# Patient Record
Sex: MALE | Race: WHITE | HISPANIC OR LATINO | URBAN - METROPOLITAN AREA
[De-identification: names, ages, dates, MRNs, and addresses within clinical notes are randomized per-mention and may not be internally consistent; named-entity substitution may affect disease eponyms.]

---

## 2017-01-21 ENCOUNTER — OUTPATIENT (OUTPATIENT)
Dept: OUTPATIENT SERVICES | Facility: HOSPITAL | Age: 47
LOS: 1 days | End: 2017-01-21
Payer: SELF-PAY

## 2017-01-21 ENCOUNTER — APPOINTMENT (OUTPATIENT)
Dept: FAMILY MEDICINE | Facility: HOSPITAL | Age: 47
End: 2017-01-21

## 2017-01-21 VITALS
RESPIRATION RATE: 16 BRPM | SYSTOLIC BLOOD PRESSURE: 132 MMHG | HEART RATE: 90 BPM | OXYGEN SATURATION: 98 % | TEMPERATURE: 208.04 F | DIASTOLIC BLOOD PRESSURE: 69 MMHG

## 2017-01-21 PROCEDURE — G0463: CPT

## 2017-02-10 ENCOUNTER — APPOINTMENT (OUTPATIENT)
Dept: OPHTHALMOLOGY | Facility: CLINIC | Age: 47
End: 2017-02-10

## 2017-03-11 ENCOUNTER — OUTPATIENT (OUTPATIENT)
Dept: OUTPATIENT SERVICES | Facility: HOSPITAL | Age: 47
LOS: 1 days | End: 2017-03-11
Payer: SELF-PAY

## 2017-03-11 ENCOUNTER — RESULT CHARGE (OUTPATIENT)
Age: 47
End: 2017-03-11

## 2017-03-11 ENCOUNTER — APPOINTMENT (OUTPATIENT)
Dept: FAMILY MEDICINE | Facility: HOSPITAL | Age: 47
End: 2017-03-11

## 2017-03-11 VITALS
BODY MASS INDEX: 55.82 KG/M2 | TEMPERATURE: 208.04 F | RESPIRATION RATE: 16 BRPM | SYSTOLIC BLOOD PRESSURE: 141 MMHG | WEIGHT: 315 LBS | DIASTOLIC BLOOD PRESSURE: 82 MMHG | HEART RATE: 82 BPM | OXYGEN SATURATION: 97 %

## 2017-03-11 PROCEDURE — G0463: CPT

## 2017-03-16 LAB
BILIRUB UR QL STRIP: NEGATIVE
CLARITY UR: NORMAL
COLLECTION METHOD: NORMAL
GLUCOSE UR-MCNC: NEGATIVE
HCG UR QL: 1 EU/DL
HGB UR QL STRIP.AUTO: NORMAL
KETONES UR-MCNC: NEGATIVE
LEUKOCYTE ESTERASE UR QL STRIP: NORMAL
NITRITE UR QL STRIP: NEGATIVE
PH UR STRIP: 7
PROT UR STRIP-MCNC: NEGATIVE
SP GR UR STRIP: 1.02

## 2017-04-18 ENCOUNTER — RESULT CHARGE (OUTPATIENT)
Age: 47
End: 2017-04-18

## 2017-04-18 ENCOUNTER — APPOINTMENT (OUTPATIENT)
Dept: UROLOGY | Facility: HOSPITAL | Age: 47
End: 2017-04-18

## 2017-04-18 ENCOUNTER — OUTPATIENT (OUTPATIENT)
Dept: OUTPATIENT SERVICES | Facility: HOSPITAL | Age: 47
LOS: 1 days | End: 2017-04-18
Payer: SELF-PAY

## 2017-04-18 VITALS
SYSTOLIC BLOOD PRESSURE: 134 MMHG | HEART RATE: 76 BPM | BODY MASS INDEX: 46.65 KG/M2 | HEIGHT: 69 IN | WEIGHT: 315 LBS | RESPIRATION RATE: 16 BRPM | TEMPERATURE: 206.96 F | OXYGEN SATURATION: 97 % | DIASTOLIC BLOOD PRESSURE: 85 MMHG

## 2017-04-18 DIAGNOSIS — R31.29 OTHER MICROSCOPIC HEMATURIA: ICD-10-CM

## 2017-04-18 DIAGNOSIS — R39.9 UNSPECIFIED SYMPTOMS AND SIGNS INVOLVING THE GENITOURINARY SYSTEM: ICD-10-CM

## 2017-04-18 LAB
BILIRUB UR QL STRIP: NEGATIVE
GLUCOSE UR-MCNC: NEGATIVE
HCG UR QL: NORMAL EU/DL
HGB UR QL STRIP.AUTO: NORMAL
KETONES UR-MCNC: NEGATIVE
LEUKOCYTE ESTERASE UR QL STRIP: NORMAL
NITRITE UR QL STRIP: NEGATIVE
PH UR STRIP: 5
PROT UR STRIP-MCNC: NEGATIVE
SP GR UR STRIP: 1.02

## 2017-04-20 ENCOUNTER — FORM ENCOUNTER (OUTPATIENT)
Age: 47
End: 2017-04-20

## 2017-04-21 PROCEDURE — 87086 URINE CULTURE/COLONY COUNT: CPT

## 2017-04-21 PROCEDURE — 74176 CT ABD & PELVIS W/O CONTRAST: CPT

## 2017-04-21 PROCEDURE — 81001 URINALYSIS AUTO W/SCOPE: CPT

## 2017-04-21 PROCEDURE — 81002 URINALYSIS NONAUTO W/O SCOPE: CPT

## 2017-04-21 PROCEDURE — G0463: CPT

## 2017-04-30 ENCOUNTER — EMERGENCY (EMERGENCY)
Facility: HOSPITAL | Age: 47
LOS: 1 days | Discharge: HOME | End: 2017-04-30
Admitting: EMERGENCY MEDICINE
Payer: SELF-PAY

## 2017-04-30 PROCEDURE — 99283 EMERGENCY DEPT VISIT LOW MDM: CPT | Mod: 25

## 2017-04-30 PROCEDURE — 73630 X-RAY EXAM OF FOOT: CPT | Mod: 26,LT

## 2017-04-30 PROCEDURE — 99283 EMERGENCY DEPT VISIT LOW MDM: CPT

## 2017-04-30 PROCEDURE — 73630 X-RAY EXAM OF FOOT: CPT

## 2017-05-04 ENCOUNTER — APPOINTMENT (OUTPATIENT)
Age: 47
End: 2017-05-04

## 2017-05-06 ENCOUNTER — OUTPATIENT (OUTPATIENT)
Dept: OUTPATIENT SERVICES | Facility: HOSPITAL | Age: 47
LOS: 1 days | End: 2017-05-06
Payer: SELF-PAY

## 2017-05-06 PROCEDURE — 81001 URINALYSIS AUTO W/SCOPE: CPT

## 2017-05-06 PROCEDURE — 87086 URINE CULTURE/COLONY COUNT: CPT

## 2017-05-13 ENCOUNTER — OUTPATIENT (OUTPATIENT)
Dept: OUTPATIENT SERVICES | Facility: HOSPITAL | Age: 47
LOS: 1 days | End: 2017-05-13
Payer: SELF-PAY

## 2017-05-13 ENCOUNTER — APPOINTMENT (OUTPATIENT)
Dept: FAMILY MEDICINE | Facility: HOSPITAL | Age: 47
End: 2017-05-13

## 2017-05-13 VITALS
SYSTOLIC BLOOD PRESSURE: 139 MMHG | BODY MASS INDEX: 55.97 KG/M2 | TEMPERATURE: 97.9 F | WEIGHT: 315 LBS | RESPIRATION RATE: 16 BRPM | HEART RATE: 84 BPM | OXYGEN SATURATION: 97 % | DIASTOLIC BLOOD PRESSURE: 86 MMHG

## 2017-05-13 PROCEDURE — 36415 COLL VENOUS BLD VENIPUNCTURE: CPT

## 2017-05-13 PROCEDURE — 80048 BASIC METABOLIC PNL TOTAL CA: CPT

## 2017-05-13 PROCEDURE — 80061 LIPID PANEL: CPT

## 2017-05-13 PROCEDURE — 84550 ASSAY OF BLOOD/URIC ACID: CPT

## 2017-05-13 PROCEDURE — 83036 HEMOGLOBIN GLYCOSYLATED A1C: CPT

## 2017-05-13 PROCEDURE — G0463: CPT

## 2017-05-16 ENCOUNTER — APPOINTMENT (OUTPATIENT)
Dept: UROLOGY | Facility: HOSPITAL | Age: 47
End: 2017-05-16

## 2017-05-16 RX ORDER — NITROFURANTOIN (MONOHYDRATE/MACROCRYSTALS) 25; 75 MG/1; MG/1
100 CAPSULE ORAL TWICE DAILY
Qty: 10 | Refills: 0 | Status: COMPLETED | COMMUNITY
Start: 2017-04-21 | End: 2017-05-16

## 2017-06-01 ENCOUNTER — APPOINTMENT (OUTPATIENT)
Dept: OPHTHALMOLOGY | Facility: CLINIC | Age: 47
End: 2017-06-01

## 2017-06-15 ENCOUNTER — APPOINTMENT (OUTPATIENT)
Dept: OPHTHALMOLOGY | Facility: CLINIC | Age: 47
End: 2017-06-15

## 2017-06-16 ENCOUNTER — FORM ENCOUNTER (OUTPATIENT)
Age: 47
End: 2017-06-16

## 2017-06-17 ENCOUNTER — OUTPATIENT (OUTPATIENT)
Dept: OUTPATIENT SERVICES | Facility: HOSPITAL | Age: 47
LOS: 1 days | End: 2017-06-17
Payer: SELF-PAY

## 2017-06-17 ENCOUNTER — APPOINTMENT (OUTPATIENT)
Dept: FAMILY MEDICINE | Facility: HOSPITAL | Age: 47
End: 2017-06-17

## 2017-06-17 VITALS
HEIGHT: 69 IN | BODY MASS INDEX: 46.65 KG/M2 | SYSTOLIC BLOOD PRESSURE: 121 MMHG | TEMPERATURE: 98 F | OXYGEN SATURATION: 98 % | DIASTOLIC BLOOD PRESSURE: 85 MMHG | HEART RATE: 65 BPM | RESPIRATION RATE: 16 BRPM | WEIGHT: 315 LBS

## 2017-06-17 PROCEDURE — 84550 ASSAY OF BLOOD/URIC ACID: CPT

## 2017-06-17 PROCEDURE — G0463: CPT

## 2017-06-17 PROCEDURE — 85652 RBC SED RATE AUTOMATED: CPT

## 2017-06-17 PROCEDURE — 86140 C-REACTIVE PROTEIN: CPT

## 2017-06-17 PROCEDURE — 80069 RENAL FUNCTION PANEL: CPT

## 2017-06-17 PROCEDURE — 85025 COMPLETE CBC W/AUTO DIFF WBC: CPT

## 2017-06-17 PROCEDURE — 36415 COLL VENOUS BLD VENIPUNCTURE: CPT

## 2017-06-17 PROCEDURE — 73610 X-RAY EXAM OF ANKLE: CPT

## 2017-07-01 ENCOUNTER — OUTPATIENT (OUTPATIENT)
Dept: OUTPATIENT SERVICES | Facility: HOSPITAL | Age: 47
LOS: 1 days | End: 2017-07-01
Payer: SELF-PAY

## 2017-07-01 ENCOUNTER — APPOINTMENT (OUTPATIENT)
Dept: FAMILY MEDICINE | Facility: HOSPITAL | Age: 47
End: 2017-07-01

## 2017-07-01 VITALS
DIASTOLIC BLOOD PRESSURE: 87 MMHG | WEIGHT: 315 LBS | BODY MASS INDEX: 55.23 KG/M2 | HEART RATE: 52 BPM | OXYGEN SATURATION: 98 % | SYSTOLIC BLOOD PRESSURE: 119 MMHG | TEMPERATURE: 97.8 F | RESPIRATION RATE: 14 BRPM

## 2017-07-01 PROCEDURE — G0463: CPT

## 2017-07-01 RX ORDER — IBUPROFEN 800 MG/1
800 TABLET, FILM COATED ORAL
Qty: 40 | Refills: 0 | Status: DISCONTINUED | COMMUNITY
Start: 2017-04-30 | End: 2017-07-01

## 2017-07-10 ENCOUNTER — OUTPATIENT (OUTPATIENT)
Dept: OUTPATIENT SERVICES | Facility: HOSPITAL | Age: 47
LOS: 1 days | End: 2017-07-10
Payer: SELF-PAY

## 2017-07-10 ENCOUNTER — APPOINTMENT (OUTPATIENT)
Dept: UROLOGY | Facility: HOSPITAL | Age: 47
End: 2017-07-10

## 2017-07-10 VITALS — HEIGHT: 69 IN | BODY MASS INDEX: 46.65 KG/M2 | WEIGHT: 315 LBS

## 2017-07-10 VITALS — RESPIRATION RATE: 14 BRPM | HEART RATE: 59 BPM | DIASTOLIC BLOOD PRESSURE: 80 MMHG | SYSTOLIC BLOOD PRESSURE: 129 MMHG

## 2017-07-10 DIAGNOSIS — R30.0 DYSURIA: ICD-10-CM

## 2017-07-10 DIAGNOSIS — N39.0 URINARY TRACT INFECTION, SITE NOT SPECIFIED: ICD-10-CM

## 2017-07-10 LAB
APPEARANCE UR: ABNORMAL
BACTERIA # UR AUTO: NEGATIVE — SIGNIFICANT CHANGE UP
BILIRUB UR-MCNC: NEGATIVE — SIGNIFICANT CHANGE UP
COD CRY URNS QL: ABNORMAL
COLOR SPEC: YELLOW — SIGNIFICANT CHANGE UP
DIFF PNL FLD: ABNORMAL
EPI CELLS # UR: 1 /HPF — SIGNIFICANT CHANGE UP (ref 0–5)
GLUCOSE UR QL: NEGATIVE MG/DL — SIGNIFICANT CHANGE UP
GRAN CASTS # UR COMP ASSIST: 0 /LPF — SIGNIFICANT CHANGE UP
HYALINE CASTS # UR AUTO: 0 /LPF — SIGNIFICANT CHANGE UP (ref 0–7)
KETONES UR-MCNC: NEGATIVE — SIGNIFICANT CHANGE UP
LEUKOCYTE ESTERASE UR-ACNC: ABNORMAL
NITRITE UR-MCNC: NEGATIVE — SIGNIFICANT CHANGE UP
PH UR: 6 — SIGNIFICANT CHANGE UP (ref 5–8)
PROT UR-MCNC: NEGATIVE MG/DL — SIGNIFICANT CHANGE UP
PSA FREE FLD-MCNC: 0.17 NG/ML — SIGNIFICANT CHANGE UP
PSA FREE FLD-MCNC: 9.9 — SIGNIFICANT CHANGE UP
PSA FREE MFR FLD: 1.71 NG/ML — SIGNIFICANT CHANGE UP (ref 0–4)
RBC CASTS # UR COMP ASSIST: 7 /HPF — HIGH (ref 0–4)
SP GR SPEC: 1.03 — HIGH (ref 1.01–1.02)
TRI-PHOS CRY UR QL COMP ASSIST: NEGATIVE — SIGNIFICANT CHANGE UP
URATE CRY FLD QL MICRO: NEGATIVE — SIGNIFICANT CHANGE UP
UROBILINOGEN FLD QL: NEGATIVE MG/DL — SIGNIFICANT CHANGE UP
WBC UR QL: 52 /HPF — HIGH (ref 0–5)

## 2017-07-10 PROCEDURE — 87086 URINE CULTURE/COLONY COUNT: CPT

## 2017-07-10 PROCEDURE — 87186 SC STD MICRODIL/AGAR DIL: CPT

## 2017-07-10 PROCEDURE — G0463: CPT

## 2017-07-10 PROCEDURE — 51798 US URINE CAPACITY MEASURE: CPT

## 2017-07-10 PROCEDURE — 84154 ASSAY OF PSA FREE: CPT

## 2017-07-10 PROCEDURE — 81001 URINALYSIS AUTO W/SCOPE: CPT

## 2017-07-10 PROCEDURE — 84153 ASSAY OF PSA TOTAL: CPT

## 2017-07-10 RX ORDER — NITROFURANTOIN (MONOHYDRATE/MACROCRYSTALS) 25; 75 MG/1; MG/1
100 CAPSULE ORAL
Qty: 10 | Refills: 0 | Status: DISCONTINUED | COMMUNITY
Start: 2017-05-13 | End: 2017-07-10

## 2018-05-22 ENCOUNTER — EMERGENCY (EMERGENCY)
Facility: HOSPITAL | Age: 48
LOS: 1 days | Discharge: HOME | End: 2018-05-22
Admitting: EMERGENCY MEDICINE
Payer: MEDICAID

## 2018-05-22 PROCEDURE — 99283 EMERGENCY DEPT VISIT LOW MDM: CPT | Mod: 25

## 2018-05-22 PROCEDURE — 99053 MED SERV 10PM-8AM 24 HR FAC: CPT

## 2018-05-22 PROCEDURE — 99283 EMERGENCY DEPT VISIT LOW MDM: CPT

## 2018-06-23 ENCOUNTER — EMERGENCY (EMERGENCY)
Facility: HOSPITAL | Age: 48
LOS: 1 days | Discharge: HOME | End: 2018-06-23
Attending: EMERGENCY MEDICINE | Admitting: EMERGENCY MEDICINE
Payer: MEDICAID

## 2018-06-23 VITALS
DIASTOLIC BLOOD PRESSURE: 95 MMHG | OXYGEN SATURATION: 96 % | WEIGHT: 315 LBS | HEART RATE: 92 BPM | SYSTOLIC BLOOD PRESSURE: 160 MMHG | RESPIRATION RATE: 18 BRPM | TEMPERATURE: 98 F

## 2018-06-23 PROCEDURE — 99282 EMERGENCY DEPT VISIT SF MDM: CPT

## 2018-09-28 ENCOUNTER — EMERGENCY (EMERGENCY)
Facility: HOSPITAL | Age: 48
LOS: 1 days | Discharge: HOME | End: 2018-09-28
Attending: EMERGENCY MEDICINE | Admitting: EMERGENCY MEDICINE
Payer: MEDICAID

## 2018-09-28 VITALS
HEART RATE: 93 BPM | DIASTOLIC BLOOD PRESSURE: 81 MMHG | WEIGHT: 315 LBS | TEMPERATURE: 98 F | HEIGHT: 69 IN | SYSTOLIC BLOOD PRESSURE: 129 MMHG | RESPIRATION RATE: 20 BRPM | OXYGEN SATURATION: 95 %

## 2018-09-28 VITALS
RESPIRATION RATE: 18 BRPM | SYSTOLIC BLOOD PRESSURE: 112 MMHG | TEMPERATURE: 98 F | DIASTOLIC BLOOD PRESSURE: 74 MMHG | OXYGEN SATURATION: 97 % | HEART RATE: 79 BPM

## 2018-09-28 DIAGNOSIS — R10.9 UNSPECIFIED ABDOMINAL PAIN: ICD-10-CM

## 2018-09-28 DIAGNOSIS — Z98.890 OTHER SPECIFIED POSTPROCEDURAL STATES: Chronic | ICD-10-CM

## 2018-09-28 LAB
ALBUMIN SERPL ELPH-MCNC: 3.4 G/DL — SIGNIFICANT CHANGE UP (ref 3.3–5)
ALP SERPL-CCNC: 90 U/L — SIGNIFICANT CHANGE UP (ref 40–120)
ALT FLD-CCNC: 36 U/L DA — SIGNIFICANT CHANGE UP (ref 10–45)
AMYLASE P1 CFR SERPL: 23 U/L — LOW (ref 25–125)
ANION GAP SERPL CALC-SCNC: 9 MMOL/L — SIGNIFICANT CHANGE UP (ref 5–17)
APTT BLD: 34.7 SEC — SIGNIFICANT CHANGE UP (ref 27.5–37.4)
AST SERPL-CCNC: 22 U/L — SIGNIFICANT CHANGE UP (ref 10–40)
BASOPHILS # BLD AUTO: 0.1 K/UL — SIGNIFICANT CHANGE UP (ref 0–0.2)
BASOPHILS NFR BLD AUTO: 0.4 % — SIGNIFICANT CHANGE UP (ref 0–2)
BILIRUB SERPL-MCNC: 0.3 MG/DL — SIGNIFICANT CHANGE UP (ref 0.2–1.2)
BUN SERPL-MCNC: 17 MG/DL — SIGNIFICANT CHANGE UP (ref 7–23)
CALCIUM SERPL-MCNC: 8.8 MG/DL — SIGNIFICANT CHANGE UP (ref 8.4–10.5)
CHLORIDE SERPL-SCNC: 101 MMOL/L — SIGNIFICANT CHANGE UP (ref 96–108)
CK MB BLD-MCNC: 0.8 % — SIGNIFICANT CHANGE UP (ref 0–3.5)
CK MB CFR SERPL CALC: 1 NG/ML — SIGNIFICANT CHANGE UP (ref 0.5–10)
CK SERPL-CCNC: 120 U/L — SIGNIFICANT CHANGE UP (ref 30–200)
CO2 SERPL-SCNC: 28 MMOL/L — SIGNIFICANT CHANGE UP (ref 22–31)
CREAT SERPL-MCNC: 0.93 MG/DL — SIGNIFICANT CHANGE UP (ref 0.5–1.3)
EOSINOPHIL # BLD AUTO: 0.3 K/UL — SIGNIFICANT CHANGE UP (ref 0–0.5)
EOSINOPHIL NFR BLD AUTO: 2.4 % — SIGNIFICANT CHANGE UP (ref 0–6)
GLUCOSE SERPL-MCNC: 133 MG/DL — HIGH (ref 70–99)
HCT VFR BLD CALC: 48.6 % — SIGNIFICANT CHANGE UP (ref 39–50)
HGB BLD-MCNC: 15.8 G/DL — SIGNIFICANT CHANGE UP (ref 13–17)
INR BLD: 1.12 RATIO — SIGNIFICANT CHANGE UP (ref 0.88–1.16)
LIDOCAIN IGE QN: 74 U/L — SIGNIFICANT CHANGE UP (ref 73–393)
LYMPHOCYTES # BLD AUTO: 0.6 K/UL — LOW (ref 1–3.3)
LYMPHOCYTES # BLD AUTO: 4.7 % — LOW (ref 13–44)
MCHC RBC-ENTMCNC: 29.3 PG — SIGNIFICANT CHANGE UP (ref 27–34)
MCHC RBC-ENTMCNC: 32.5 GM/DL — SIGNIFICANT CHANGE UP (ref 32–36)
MCV RBC AUTO: 90.1 FL — SIGNIFICANT CHANGE UP (ref 80–100)
MONOCYTES # BLD AUTO: 0.6 K/UL — SIGNIFICANT CHANGE UP (ref 0–0.9)
MONOCYTES NFR BLD AUTO: 5.2 % — SIGNIFICANT CHANGE UP (ref 1–9)
NEUTROPHILS # BLD AUTO: 10.3 K/UL — HIGH (ref 1.8–7.4)
NEUTROPHILS NFR BLD AUTO: 87.2 % — HIGH (ref 43–77)
PLATELET # BLD AUTO: 380 K/UL — SIGNIFICANT CHANGE UP (ref 150–400)
POTASSIUM SERPL-MCNC: 4.1 MMOL/L — SIGNIFICANT CHANGE UP (ref 3.5–5.3)
POTASSIUM SERPL-SCNC: 4.1 MMOL/L — SIGNIFICANT CHANGE UP (ref 3.5–5.3)
PROT SERPL-MCNC: 7.6 G/DL — SIGNIFICANT CHANGE UP (ref 6–8.3)
PROTHROM AB SERPL-ACNC: 12.5 SEC — SIGNIFICANT CHANGE UP (ref 9.8–12.7)
RBC # BLD: 5.39 M/UL — SIGNIFICANT CHANGE UP (ref 4.2–5.8)
RBC # FLD: 13.2 % — SIGNIFICANT CHANGE UP (ref 10.3–14.5)
SODIUM SERPL-SCNC: 138 MMOL/L — SIGNIFICANT CHANGE UP (ref 135–145)
TROPONIN I SERPL-MCNC: <.017 NG/ML — LOW (ref 0.02–0.06)
WBC # BLD: 11.8 K/UL — HIGH (ref 3.8–10.5)
WBC # FLD AUTO: 11.8 K/UL — HIGH (ref 3.8–10.5)

## 2018-09-28 PROCEDURE — 93010 ELECTROCARDIOGRAM REPORT: CPT

## 2018-09-28 PROCEDURE — 74177 CT ABD & PELVIS W/CONTRAST: CPT

## 2018-09-28 PROCEDURE — 96361 HYDRATE IV INFUSION ADD-ON: CPT

## 2018-09-28 PROCEDURE — 82550 ASSAY OF CK (CPK): CPT

## 2018-09-28 PROCEDURE — 83690 ASSAY OF LIPASE: CPT

## 2018-09-28 PROCEDURE — 93005 ELECTROCARDIOGRAM TRACING: CPT

## 2018-09-28 PROCEDURE — 84484 ASSAY OF TROPONIN QUANT: CPT

## 2018-09-28 PROCEDURE — 80053 COMPREHEN METABOLIC PANEL: CPT

## 2018-09-28 PROCEDURE — 96374 THER/PROPH/DIAG INJ IV PUSH: CPT | Mod: XU

## 2018-09-28 PROCEDURE — 96375 TX/PRO/DX INJ NEW DRUG ADDON: CPT

## 2018-09-28 PROCEDURE — 82553 CREATINE MB FRACTION: CPT

## 2018-09-28 PROCEDURE — 85610 PROTHROMBIN TIME: CPT

## 2018-09-28 PROCEDURE — 99284 EMERGENCY DEPT VISIT MOD MDM: CPT | Mod: 25

## 2018-09-28 PROCEDURE — 74177 CT ABD & PELVIS W/CONTRAST: CPT | Mod: 26

## 2018-09-28 PROCEDURE — 85027 COMPLETE CBC AUTOMATED: CPT

## 2018-09-28 PROCEDURE — 85730 THROMBOPLASTIN TIME PARTIAL: CPT

## 2018-09-28 PROCEDURE — 82150 ASSAY OF AMYLASE: CPT

## 2018-09-28 RX ORDER — FAMOTIDINE 10 MG/ML
20 INJECTION INTRAVENOUS ONCE
Qty: 0 | Refills: 0 | Status: COMPLETED | OUTPATIENT
Start: 2018-09-28 | End: 2018-09-28

## 2018-09-28 RX ORDER — SODIUM CHLORIDE 9 MG/ML
3 INJECTION INTRAMUSCULAR; INTRAVENOUS; SUBCUTANEOUS ONCE
Qty: 0 | Refills: 0 | Status: COMPLETED | OUTPATIENT
Start: 2018-09-28 | End: 2018-09-28

## 2018-09-28 RX ORDER — PANTOPRAZOLE SODIUM 20 MG/1
1 TABLET, DELAYED RELEASE ORAL
Qty: 30 | Refills: 0 | OUTPATIENT
Start: 2018-09-28 | End: 2018-10-27

## 2018-09-28 RX ORDER — ONDANSETRON 8 MG/1
4 TABLET, FILM COATED ORAL ONCE
Qty: 0 | Refills: 0 | Status: COMPLETED | OUTPATIENT
Start: 2018-09-28 | End: 2018-09-28

## 2018-09-28 RX ORDER — SODIUM CHLORIDE 9 MG/ML
1000 INJECTION INTRAMUSCULAR; INTRAVENOUS; SUBCUTANEOUS ONCE
Qty: 0 | Refills: 0 | Status: COMPLETED | OUTPATIENT
Start: 2018-09-28 | End: 2018-09-28

## 2018-09-28 RX ADMIN — SODIUM CHLORIDE 3 MILLILITER(S): 9 INJECTION INTRAMUSCULAR; INTRAVENOUS; SUBCUTANEOUS at 05:46

## 2018-09-28 RX ADMIN — FAMOTIDINE 100 MILLIGRAM(S): 10 INJECTION INTRAVENOUS at 05:46

## 2018-09-28 RX ADMIN — FAMOTIDINE 20 MILLIGRAM(S): 10 INJECTION INTRAVENOUS at 05:48

## 2018-09-28 RX ADMIN — SODIUM CHLORIDE 1000 MILLILITER(S): 9 INJECTION INTRAMUSCULAR; INTRAVENOUS; SUBCUTANEOUS at 05:45

## 2018-09-28 RX ADMIN — SODIUM CHLORIDE 1000 MILLILITER(S): 9 INJECTION INTRAMUSCULAR; INTRAVENOUS; SUBCUTANEOUS at 07:15

## 2018-09-28 RX ADMIN — ONDANSETRON 4 MILLIGRAM(S): 8 TABLET, FILM COATED ORAL at 05:46

## 2018-09-28 NOTE — ED PROVIDER NOTE - OBJECTIVE STATEMENT
pt c/o upper abd pain since 7pm tonight. moderate, burning, sharp. assoc c nausea. self induced vomited x2 c some relief. worse lying down.  no chest pain/sob. no h/o similar sx or chest pain c exertion.  pt states he had buffet with "bad food" night before and was not feeling well all day. +family h/o pancreatic ca

## 2018-09-28 NOTE — ED ADULT NURSE NOTE - OBJECTIVE STATEMENT
pt c/o upper abd pain with nausea since wednesday night.  pt induced vomiting x 2 with a little relief afterwards.

## 2018-09-28 NOTE — ED ADULT NURSE NOTE - CHIEF COMPLAINT QUOTE
pt c/o upper abd pain with nausea since Wednesday night after eating buffet.  pt forced vomited x2 yesterday, stated he felt a little better afterwards.  pt took 2 AlkaSeltzer at 9pm with a little relief.  pain worse when he lies down,  c/o SOB x 2 days

## 2018-09-28 NOTE — ED PROVIDER NOTE - MEDICAL DECISION MAKING DETAILS
Health Maintenance Summary     Topic Due On Due Status Completed On    IMMUNIZATION - DTaP/Tdap/Td Jan 19, 2020 Not Due Jan 19, 2010    Immunization-Influenza  Completed Mar 13, 2017          Patient is due for topics as listed above, he wishes to discuss with provider .     upper abd pain . possible indigestion, gerd, gastritis, r/o pancreatitis, cholecystitis.  h/p not c/w cardiac pathology but will check ekg, trop.  check labs, ct ap. give pepcid/zofran. reassess

## 2018-10-13 ENCOUNTER — TRANSCRIPTION ENCOUNTER (OUTPATIENT)
Age: 48
End: 2018-10-13

## 2018-10-13 ENCOUNTER — APPOINTMENT (OUTPATIENT)
Dept: FAMILY MEDICINE | Facility: HOSPITAL | Age: 48
End: 2018-10-13

## 2018-10-13 ENCOUNTER — OUTPATIENT (OUTPATIENT)
Dept: OUTPATIENT SERVICES | Facility: HOSPITAL | Age: 48
LOS: 1 days | End: 2018-10-13
Payer: SELF-PAY

## 2018-10-13 VITALS
RESPIRATION RATE: 16 BRPM | WEIGHT: 315 LBS | DIASTOLIC BLOOD PRESSURE: 99 MMHG | BODY MASS INDEX: 54.34 KG/M2 | SYSTOLIC BLOOD PRESSURE: 149 MMHG | OXYGEN SATURATION: 97 % | TEMPERATURE: 97.8 F | HEART RATE: 97 BPM

## 2018-10-13 DIAGNOSIS — Z98.890 OTHER SPECIFIED POSTPROCEDURAL STATES: Chronic | ICD-10-CM

## 2018-10-13 DIAGNOSIS — E78.5 HYPERLIPIDEMIA, UNSPECIFIED: ICD-10-CM

## 2018-10-13 DIAGNOSIS — Z00.00 ENCOUNTER FOR GENERAL ADULT MEDICAL EXAMINATION WITHOUT ABNORMAL FINDINGS: ICD-10-CM

## 2018-10-13 PROBLEM — E66.9 OBESITY, UNSPECIFIED: Chronic | Status: ACTIVE | Noted: 2018-09-28

## 2018-10-13 PROBLEM — G47.30 SLEEP APNEA, UNSPECIFIED: Chronic | Status: ACTIVE | Noted: 2018-09-28

## 2018-10-13 PROCEDURE — 80053 COMPREHEN METABOLIC PANEL: CPT

## 2018-10-13 PROCEDURE — 83036 HEMOGLOBIN GLYCOSYLATED A1C: CPT

## 2018-10-13 PROCEDURE — G0463: CPT

## 2018-10-13 PROCEDURE — 80061 LIPID PANEL: CPT

## 2018-10-13 PROCEDURE — 84443 ASSAY THYROID STIM HORMONE: CPT

## 2018-10-13 NOTE — PHYSICAL EXAM

## 2018-10-13 NOTE — PLAN
[FreeTextEntry1] : 48M w/PMH of morbid obesity, gout, nephrolithiasis, urethral stricture presents to clinic for CPE. \par \par #Nephrolithiasis\par - Pt reports hematuria and dysuria resolved 6 months ago\par - Reviewed CT abd which revealed punctuate B/L stones\par \par #Lipoma\par - R medial antecubital mass, 6cm, firm, rubbery, mobile\par - F/U U/S R UE\par \par #Morbid obesity / prediabetes \par - F/U A1c\par - Nutritionist referral\par \par #HM\par - F/U CBC, CMP, lipids, A1c \par - Pt declines flu vaccine\par \par Case discussed w/ Dr Boles

## 2018-10-13 NOTE — HISTORY OF PRESENT ILLNESS
[FreeTextEntry1] : CPE [de-identified] : 48M w/PMH of morbid obesity, gout, nephrolithiasis, urethral stricture presents to clinic for CPE. Pt reports hematuria and dysuria resolved 6 months ago. Pt has concern for R medial antecubital mass, 6cm, firm, rubbery, mobile which pt reports was "harder" previously. Pt denies pain/tenderness, fevers/chills, weight change.

## 2018-10-16 DIAGNOSIS — D17.21 BENIGN LIPOMATOUS NEOPLASM OF SKIN AND SUBCUTANEOUS TISSUE OF RIGHT ARM: ICD-10-CM

## 2018-10-16 DIAGNOSIS — E78.5 HYPERLIPIDEMIA, UNSPECIFIED: ICD-10-CM

## 2018-10-16 DIAGNOSIS — R73.03 PREDIABETES: ICD-10-CM

## 2018-10-21 LAB
BASOPHILS # BLD AUTO: 0.06 K/UL
BASOPHILS NFR BLD AUTO: 0.6 %
EOSINOPHIL # BLD AUTO: 0.36 K/UL
EOSINOPHIL NFR BLD AUTO: 3.4 %
HCT VFR BLD CALC: 47.3 %
HGB BLD-MCNC: 15 G/DL
IMM GRANULOCYTES NFR BLD AUTO: 0.4 %
LYMPHOCYTES # BLD AUTO: 2.26 K/UL
LYMPHOCYTES NFR BLD AUTO: 21.1 %
MAN DIFF?: NORMAL
MCHC RBC-ENTMCNC: 28.8 PG
MCHC RBC-ENTMCNC: 31.7 GM/DL
MCV RBC AUTO: 90.8 FL
MONOCYTES # BLD AUTO: 0.75 K/UL
MONOCYTES NFR BLD AUTO: 7 %
NEUTROPHILS # BLD AUTO: 7.22 K/UL
NEUTROPHILS NFR BLD AUTO: 67.5 %
PLATELET # BLD AUTO: 429 K/UL
RBC # BLD: 5.21 M/UL
RBC # FLD: 13.9 %
WBC # FLD AUTO: 10.69 K/UL

## 2018-10-22 LAB
ALBUMIN SERPL ELPH-MCNC: 4.1 G/DL
ALP BLD-CCNC: 101 U/L
ALT SERPL-CCNC: 24 U/L
ANION GAP SERPL CALC-SCNC: 12 MMOL/L
AST SERPL-CCNC: 25 U/L
BILIRUB SERPL-MCNC: 0.4 MG/DL
BUN SERPL-MCNC: 18 MG/DL
CALCIUM SERPL-MCNC: 9.7 MG/DL
CHLORIDE SERPL-SCNC: 100 MMOL/L
CHOLEST SERPL-MCNC: 192 MG/DL
CHOLEST/HDLC SERPL: 5.3 RATIO
CO2 SERPL-SCNC: 29 MMOL/L
CREAT SERPL-MCNC: 0.89 MG/DL
GLUCOSE SERPL-MCNC: 91 MG/DL
HBA1C MFR BLD HPLC: 5.9 %
HDLC SERPL-MCNC: 36 MG/DL
LDLC SERPL CALC-MCNC: 127 MG/DL
POTASSIUM SERPL-SCNC: 4.9 MMOL/L
PROT SERPL-MCNC: 7.7 G/DL
SODIUM SERPL-SCNC: 141 MMOL/L
TRIGL SERPL-MCNC: 147 MG/DL
TSH SERPL-ACNC: 1.86 UIU/ML

## 2019-02-09 ENCOUNTER — MED ADMIN CHARGE (OUTPATIENT)
Age: 49
End: 2019-02-09

## 2019-02-09 ENCOUNTER — OUTPATIENT (OUTPATIENT)
Dept: OUTPATIENT SERVICES | Facility: HOSPITAL | Age: 49
LOS: 1 days | End: 2019-02-09
Payer: SELF-PAY

## 2019-02-09 ENCOUNTER — APPOINTMENT (OUTPATIENT)
Age: 49
End: 2019-02-09

## 2019-02-09 VITALS
OXYGEN SATURATION: 98 % | DIASTOLIC BLOOD PRESSURE: 86 MMHG | HEART RATE: 66 BPM | RESPIRATION RATE: 16 BRPM | SYSTOLIC BLOOD PRESSURE: 136 MMHG | BODY MASS INDEX: 46.65 KG/M2 | HEIGHT: 69 IN | WEIGHT: 315 LBS | TEMPERATURE: 97.9 F

## 2019-02-09 DIAGNOSIS — Z98.890 OTHER SPECIFIED POSTPROCEDURAL STATES: Chronic | ICD-10-CM

## 2019-02-09 DIAGNOSIS — Z00.00 ENCOUNTER FOR GENERAL ADULT MEDICAL EXAMINATION WITHOUT ABNORMAL FINDINGS: ICD-10-CM

## 2019-02-09 PROCEDURE — G0463: CPT

## 2019-02-09 NOTE — ASSESSMENT
[FreeTextEntry1] : Tdap given today\par \par RTC in one week for f/u and flu shot\par Consider CT of the sinuses if patient nasal breathing is worsening or not alleviated by Fluticasone\par \par Case dw Dr. Boles

## 2019-02-09 NOTE — PHYSICAL EXAM

## 2019-02-09 NOTE — HISTORY OF PRESENT ILLNESS
[FreeTextEntry8] : Patient is 48y M presenting acutely to the clinic for cough. patient with viral URI one week ago, was febrile, has not been febrile in a week, and is now with lingering cough, hardly any production, sometimes with white phlegm. Patient also admits a history of boxing, and broken noses, has trouble with breathing through his nose at night, admits increased congestion as well, uses CPAP normally due to MYA. Patient also with complaints of muscle cramping, intermittent pain without radiation in L side, no exacerbating factors, but notices it more when lying in bed. Has tempurpedic mattress at home.

## 2019-02-09 NOTE — REVIEW OF SYSTEMS
[Postnasal Drip] : postnasal drip [Cough] : cough [Muscle Pain] : muscle pain [Muscle Weakness] : muscle weakness [Back Pain] : back pain [Negative] : Heme/Lymph [Nasal Discharge] : no nasal discharge [Shortness Of Breath] : no shortness of breath [Wheezing] : no wheezing [Dyspnea on Exertion] : not dyspnea on exertion [Joint Pain] : no joint pain [Joint Stiffness] : no joint stiffness [Joint Swelling] : no joint swelling [FreeTextEntry4] : nasal congestion+ [FreeTextEntry9] : muscle weakness+ oblique and R paraspinal muscle pain/cramps

## 2019-02-13 DIAGNOSIS — R09.81 NASAL CONGESTION: ICD-10-CM

## 2019-12-30 ENCOUNTER — OUTPATIENT (OUTPATIENT)
Dept: OUTPATIENT SERVICES | Facility: HOSPITAL | Age: 49
LOS: 1 days | End: 2019-12-30
Payer: SELF-PAY

## 2019-12-30 ENCOUNTER — APPOINTMENT (OUTPATIENT)
Dept: FAMILY MEDICINE | Facility: HOSPITAL | Age: 49
End: 2019-12-30

## 2019-12-30 VITALS
OXYGEN SATURATION: 96 % | HEART RATE: 94 BPM | DIASTOLIC BLOOD PRESSURE: 76 MMHG | WEIGHT: 315 LBS | TEMPERATURE: 98.4 F | HEIGHT: 69 IN | SYSTOLIC BLOOD PRESSURE: 110 MMHG | RESPIRATION RATE: 18 BRPM | BODY MASS INDEX: 46.65 KG/M2

## 2019-12-30 DIAGNOSIS — Z87.19 PERSONAL HISTORY OF OTHER DISEASES OF THE DIGESTIVE SYSTEM: ICD-10-CM

## 2019-12-30 DIAGNOSIS — Z98.890 OTHER SPECIFIED POSTPROCEDURAL STATES: Chronic | ICD-10-CM

## 2019-12-30 DIAGNOSIS — Z00.00 ENCOUNTER FOR GENERAL ADULT MEDICAL EXAMINATION WITHOUT ABNORMAL FINDINGS: ICD-10-CM

## 2019-12-30 PROCEDURE — G0463: CPT

## 2019-12-30 RX ORDER — CLOTRIMAZOLE 10 MG/G
1 CREAM TOPICAL TWICE DAILY
Qty: 1 | Refills: 0 | Status: DISCONTINUED | COMMUNITY
Start: 2017-06-17 | End: 2019-12-30

## 2019-12-30 RX ORDER — NAPROXEN 500 MG/1
500 TABLET ORAL 3 TIMES DAILY
Qty: 42 | Refills: 0 | Status: DISCONTINUED | COMMUNITY
Start: 2019-02-09 | End: 2019-12-30

## 2019-12-30 RX ORDER — CYCLOBENZAPRINE HYDROCHLORIDE 5 MG/1
5 TABLET, FILM COATED ORAL
Qty: 7 | Refills: 0 | Status: DISCONTINUED | COMMUNITY
Start: 2019-02-09 | End: 2019-12-30

## 2019-12-30 RX ORDER — FLUTICASONE PROPIONATE 50 UG/1
50 SPRAY, METERED NASAL TWICE DAILY
Qty: 1 | Refills: 0 | Status: DISCONTINUED | COMMUNITY
Start: 2019-02-09 | End: 2019-12-30

## 2019-12-31 DIAGNOSIS — J06.9 ACUTE UPPER RESPIRATORY INFECTION, UNSPECIFIED: ICD-10-CM

## 2019-12-31 DIAGNOSIS — R05 COUGH: ICD-10-CM

## 2019-12-31 NOTE — END OF VISIT
[] : Resident [FreeTextEntry3] : pt may have resolving Flu as his wife was positive for Flu test today, however it has been 4 days with sickness no med required

## 2019-12-31 NOTE — HISTORY OF PRESENT ILLNESS
[FreeTextEntry8] : 48 yo male presenting with c/o cough productive of green-rob sputum, nasal congestion and sore throat. Pt reports his sxs started 4 days ago with sneezing, later with associated sxs of chills, cough, pain in chest with cough, subjective fever on day of sxs onset, no actual recorded temp. Pt reports taking theraflu at home. States he feels a little better today compared to previous day, however wanted to come in to be evaluated given past hx of PNA.\par Of note, pt's wife is also sick with +flu.\par \par Pt reports using CPAP (setting of 18) at home for sleep apnea.

## 2020-01-11 ENCOUNTER — MED ADMIN CHARGE (OUTPATIENT)
Age: 50
End: 2020-01-11

## 2020-01-11 ENCOUNTER — APPOINTMENT (OUTPATIENT)
Dept: FAMILY MEDICINE | Facility: HOSPITAL | Age: 50
End: 2020-01-11

## 2020-01-11 ENCOUNTER — OUTPATIENT (OUTPATIENT)
Dept: OUTPATIENT SERVICES | Facility: HOSPITAL | Age: 50
LOS: 1 days | End: 2020-01-11
Payer: SELF-PAY

## 2020-01-11 VITALS
HEART RATE: 79 BPM | DIASTOLIC BLOOD PRESSURE: 80 MMHG | BODY MASS INDEX: 56.41 KG/M2 | WEIGHT: 315 LBS | OXYGEN SATURATION: 97 % | SYSTOLIC BLOOD PRESSURE: 118 MMHG | TEMPERATURE: 98.3 F | RESPIRATION RATE: 16 BRPM

## 2020-01-11 DIAGNOSIS — Z00.00 ENCOUNTER FOR GENERAL ADULT MEDICAL EXAMINATION WITHOUT ABNORMAL FINDINGS: ICD-10-CM

## 2020-01-11 DIAGNOSIS — Z98.890 OTHER SPECIFIED POSTPROCEDURAL STATES: Chronic | ICD-10-CM

## 2020-01-11 PROCEDURE — 80061 LIPID PANEL: CPT

## 2020-01-11 PROCEDURE — 86706 HEP B SURFACE ANTIBODY: CPT

## 2020-01-11 PROCEDURE — 86762 RUBELLA ANTIBODY: CPT

## 2020-01-11 PROCEDURE — 86735 MUMPS ANTIBODY: CPT

## 2020-01-11 PROCEDURE — 83036 HEMOGLOBIN GLYCOSYLATED A1C: CPT

## 2020-01-11 PROCEDURE — 86787 VARICELLA-ZOSTER ANTIBODY: CPT

## 2020-01-11 PROCEDURE — 86593 SYPHILIS TEST NON-TREP QUANT: CPT

## 2020-01-11 PROCEDURE — 80048 BASIC METABOLIC PNL TOTAL CA: CPT

## 2020-01-11 PROCEDURE — 86480 TB TEST CELL IMMUN MEASURE: CPT

## 2020-01-11 PROCEDURE — G0463: CPT

## 2020-01-11 PROCEDURE — 86765 RUBEOLA ANTIBODY: CPT

## 2020-01-11 RX ORDER — BENZONATATE 100 MG/1
100 CAPSULE ORAL
Qty: 21 | Refills: 0 | Status: COMPLETED | COMMUNITY
Start: 2019-02-09 | End: 2020-01-11

## 2020-01-11 NOTE — COUNSELING
[Encouraged to increase physical activity] : Encouraged to increase physical activity [Benefits of weight loss discussed] : Benefits of weight loss discussed [Potential consequences of obesity discussed] : Potential consequences of obesity discussed [Good understanding] : Patient has a good understanding of disease, goals and obesity follow-up plan [Decrease Portions] : decrease portions [____ min/wk Activity] : [unfilled] min/wk activity

## 2020-01-12 NOTE — HEALTH RISK ASSESSMENT
[Good] : ~his/her~  mood as  good [No] : No [With Significant Other] : lives with significant other [Employed] : employed [] :  [Fully functional (using the telephone, shopping, preparing meals, housekeeping, doing laundry, using] : Fully functional and needs no help or supervision to perform IADLs (using the telephone, shopping, preparing meals, housekeeping, doing laundry, using transportation, managing medications and managing finances) [Fully functional (bathing, dressing, toileting, transferring, walking, feeding)] : Fully functional (bathing, dressing, toileting, transferring, walking, feeding) [Carbon Monoxide Detector] : carbon monoxide detector [Smoke Detector] : smoke detector [] : No [Change in mental status noted] : No change in mental status noted [Reports changes in vision] : Reports no changes in vision [Reports changes in hearing] : Reports no changes in hearing [Reports changes in dental health] : Reports no changes in dental health [Guns at Home] : no guns at home [de-identified] : going for employment

## 2020-01-12 NOTE — PHYSICAL EXAM
[No Acute Distress] : no acute distress [Well-Appearing] : well-appearing [Normal Sclera/Conjunctiva] : normal sclera/conjunctiva [Normal Oropharynx] : the oropharynx was normal [PERRL] : pupils equal round and reactive to light [EOMI] : extraocular movements intact [No Lymphadenopathy] : no lymphadenopathy [Normal TMs] : both tympanic membranes were normal [Supple] : supple [No JVD] : no jugular venous distention [No Respiratory Distress] : no respiratory distress  [Thyroid Normal, No Nodules] : the thyroid was normal and there were no nodules present [No Accessory Muscle Use] : no accessory muscle use [Normal Rate] : normal rate  [Clear to Auscultation] : lungs were clear to auscultation bilaterally [Normal S1, S2] : normal S1 and S2 [Regular Rhythm] : with a regular rhythm [Pedal Pulses Present] : the pedal pulses are present [No Murmur] : no murmur heard [Non Tender] : non-tender [No Extremity Clubbing/Cyanosis] : no extremity clubbing/cyanosis [Soft] : abdomen soft [Normal Bowel Sounds] : normal bowel sounds [No CVA Tenderness] : no CVA  tenderness [No Masses] : no abdominal mass palpated [No Joint Swelling] : no joint swelling [No Spinal Tenderness] : no spinal tenderness [No Focal Deficits] : no focal deficits [Normal Gait] : normal gait [No Rash] : no rash [Grossly Normal Strength/Tone] : grossly normal strength/tone [Normal Affect] : the affect was normal [de-identified] : obese habitus [de-identified] : obese abdomen

## 2020-01-12 NOTE — HISTORY OF PRESENT ILLNESS
[de-identified] : 48 yo male with morbid obesity, presenting today for CPE. Pt also requesting for blood work (vaccine titers) for employment. Pt states overall doing well, reports URI sxs resolved except cough which is still persisting. Pt states he and his wife (who is a dietitian) have planned to embark on a journey to lose weight, they have enrolled in a gym membership, plans to go to gym more often, and eating healthier.

## 2020-01-14 ENCOUNTER — TRANSCRIPTION ENCOUNTER (OUTPATIENT)
Age: 50
End: 2020-01-14

## 2020-01-14 DIAGNOSIS — Z29.9 ENCOUNTER FOR PROPHYLACTIC MEASURES, UNSPECIFIED: ICD-10-CM

## 2020-01-15 LAB
ANION GAP SERPL CALC-SCNC: 12 MMOL/L
BASOPHILS # BLD AUTO: 0.05 K/UL
BASOPHILS NFR BLD AUTO: 0.6 %
BUN SERPL-MCNC: 12 MG/DL
C TRACH RRNA SPEC QL NAA+PROBE: NOT DETECTED
CALCIUM SERPL-MCNC: 9.4 MG/DL
CHLORIDE SERPL-SCNC: 103 MMOL/L
CHOLEST SERPL-MCNC: 184 MG/DL
CHOLEST/HDLC SERPL: 5.4 RATIO
CO2 SERPL-SCNC: 28 MMOL/L
CREAT SERPL-MCNC: 0.79 MG/DL
EOSINOPHIL # BLD AUTO: 0.21 K/UL
EOSINOPHIL NFR BLD AUTO: 2.5 %
ESTIMATED AVERAGE GLUCOSE: 137 MG/DL
GLUCOSE SERPL-MCNC: 99 MG/DL
HBA1C MFR BLD HPLC: 6.4 %
HBV SURFACE AB SER QL: NONREACTIVE
HCT VFR BLD CALC: 47.8 %
HDLC SERPL-MCNC: 34 MG/DL
HGB BLD-MCNC: 14.9 G/DL
IMM GRANULOCYTES NFR BLD AUTO: 0.5 %
LDLC SERPL CALC-MCNC: 130 MG/DL
LYMPHOCYTES # BLD AUTO: 1.95 K/UL
LYMPHOCYTES NFR BLD AUTO: 23.2 %
MAN DIFF?: NORMAL
MCHC RBC-ENTMCNC: 28.6 PG
MCHC RBC-ENTMCNC: 31.2 GM/DL
MCV RBC AUTO: 91.7 FL
MEV IGG FLD QL IA: >300 AU/ML
MEV IGG+IGM SER-IMP: POSITIVE
MONOCYTES # BLD AUTO: 0.66 K/UL
MONOCYTES NFR BLD AUTO: 7.8 %
MUV AB SER-ACNC: POSITIVE
MUV IGG SER QL IA: 183 AU/ML
N GONORRHOEA RRNA SPEC QL NAA+PROBE: NOT DETECTED
NEUTROPHILS # BLD AUTO: 5.51 K/UL
NEUTROPHILS NFR BLD AUTO: 65.4 %
PLATELET # BLD AUTO: 563 K/UL
POTASSIUM SERPL-SCNC: 4.8 MMOL/L
RBC # BLD: 5.21 M/UL
RBC # FLD: 13.6 %
RUBV IGG FLD-ACNC: >33 INDEX
RUBV IGG SER-IMP: POSITIVE
SODIUM SERPL-SCNC: 142 MMOL/L
SOURCE AMPLIFICATION: NORMAL
TRIGL SERPL-MCNC: 98 MG/DL
VZV AB TITR SER: POSITIVE
VZV IGG SER IF-ACNC: 1363 INDEX
WBC # FLD AUTO: 8.42 K/UL

## 2020-01-17 LAB
M TB IFN-G BLD-IMP: NEGATIVE
QUANTIFERON TB PLUS MITOGEN MINUS NIL: 9.28 IU/ML
QUANTIFERON TB PLUS NIL: 0.04 IU/ML
QUANTIFERON TB PLUS TB1 MINUS NIL: -0.02 IU/ML
QUANTIFERON TB PLUS TB2 MINUS NIL: -0.01 IU/ML
RPR SER-TITR: NORMAL

## 2020-01-17 NOTE — PHYSICAL EXAM
[No Acute Distress] : no acute distress [Well-Appearing] : well-appearing [Normal] : normal rate, regular rhythm, normal S1 and S2 and no murmur heard

## 2020-01-18 ENCOUNTER — OUTPATIENT (OUTPATIENT)
Dept: OUTPATIENT SERVICES | Facility: HOSPITAL | Age: 50
LOS: 1 days | End: 2020-01-18
Payer: SELF-PAY

## 2020-01-18 ENCOUNTER — APPOINTMENT (OUTPATIENT)
Dept: FAMILY MEDICINE | Facility: HOSPITAL | Age: 50
End: 2020-01-18

## 2020-01-18 VITALS
HEART RATE: 70 BPM | DIASTOLIC BLOOD PRESSURE: 85 MMHG | SYSTOLIC BLOOD PRESSURE: 131 MMHG | OXYGEN SATURATION: 96 % | RESPIRATION RATE: 16 BRPM | TEMPERATURE: 98.4 F | WEIGHT: 315 LBS | BODY MASS INDEX: 57 KG/M2

## 2020-01-18 DIAGNOSIS — Z00.00 ENCOUNTER FOR GENERAL ADULT MEDICAL EXAMINATION WITHOUT ABNORMAL FINDINGS: ICD-10-CM

## 2020-01-18 DIAGNOSIS — Z98.890 OTHER SPECIFIED POSTPROCEDURAL STATES: Chronic | ICD-10-CM

## 2020-01-18 PROCEDURE — G0463: CPT

## 2020-01-18 PROCEDURE — 90746 HEPB VACCINE 3 DOSE ADULT IM: CPT

## 2020-01-18 NOTE — ASSESSMENT
[FreeTextEntry1] : 49M w/ morbid obesity, prediabetes, and HLD presenting for pre-employment vaccination.

## 2020-01-18 NOTE — REVIEW OF SYSTEMS
[Fever] : no fever [Chills] : no chills [Chest Pain] : no chest pain [Palpitations] : no palpitations [Shortness Of Breath] : no shortness of breath [Cough] : no cough [Abdominal Pain] : no abdominal pain [Vomiting] : no vomiting

## 2020-01-18 NOTE — PLAN
[FreeTextEntry1] : #Pre-employment vaccination\par - Pt was immune to hepatitis B\par - first hep B dose today\par - RTC in 1 month for 2nd dose\par \par #Morbid obesity \par - reinforced pt plan \par - RTC in 1 month to f/u weight loss\par \par #HCM\par - vaccinations up to date\par - screenings up to date\par - Last CPE Jan 2020\par \par RTC in 1 month for f/u weight loss and 2nd dose hep B vaccine\par d/w Dr. Hastings

## 2020-01-18 NOTE — HISTORY OF PRESENT ILLNESS
[FreeTextEntry8] : 49M w/ morbid obesity, prediabetes, and HLD presenting for pre-employment vaccination. Pt found to be Hep B antibody nonreactive. Pt feeling well today. Cough resolvedt. Discussed w/ pt about weight management again. Pt reports he and his wife are making a conscious effort to lose weight together.

## 2020-01-21 DIAGNOSIS — Z23 ENCOUNTER FOR IMMUNIZATION: ICD-10-CM

## 2020-01-21 DIAGNOSIS — E66.01 MORBID (SEVERE) OBESITY DUE TO EXCESS CALORIES: ICD-10-CM

## 2020-02-20 LAB — PLATELET # PLAS AUTO: 328 K/UL

## 2020-04-14 NOTE — HISTORY OF PRESENT ILLNESS
[Yes] : yes [No] : no difficulty breathing [None] : none [Stable] : stable [Patient advised to contact office if symptoms progress] : Patient advised to contact office if symptoms progress [FreeTextEntry1] : Pt reports feeling well. His fever is coming down. Last fever was yesterday. Pt continues to have cough improving, but cough suppressant is working well. No SOB. Discussed w/ pt that he should call back if he develops SOB. Pt and wife demonstrated understanding.  [TextBox_8] : 8

## 2020-04-22 NOTE — PLAN
[FreeTextEntry1] : we discussed that his course of his recovery is more lengthy that expected , cont the suggested previous recommendation about self quarantine and personal hygiene and well balance diet and hydration.

## 2020-04-23 NOTE — HISTORY OF PRESENT ILLNESS
[Verbal consent obtained from patient] : the patient, [unfilled] [Time Spent: ___ minutes] : I have spent [unfilled] minutes with the patient on the telephone [FreeTextEntry1] : Pt was feeling not so well yesterday when we talked and I advised him to call me back if any changes in his condition or go to ER. He left a message for me that he is not feeling well. I called him and he said that he had HA last night and didn't feel good and checked his pulse oxy which was fluctuating and early this morning it was in 88% on RA. right now his vertigo is resolved and his pulse oxy is 93-94% and he doesn't feel any SOB, doesn't cough but he and his wife are concern if anything can be done

## 2020-04-23 NOTE — PLAN
[FreeTextEntry1] : This is the first time that patient's pulse Oxy is dropping to 88%. Right now pt is completely asymptomatic but the concern of him about repeat issue was discussed. I informed him that he can go to ER at any time if his pulse oxy drops below 93 or if he gets SOB should go to ER or call them and discuss his situation right now.His wife is asking if I can order testing and give medication. We discussed the outpatient management and the fact that testing will not change our management and antibiotic is reserved for inpatient service for very sick patient who require medication. Again I emphasized that if pulse oxy drops below 93 to go to ER, both of them verbalized understanding. I will be following with them tomorrow.

## 2020-04-27 NOTE — HISTORY OF PRESENT ILLNESS
[FreeTextEntry1] : Pt was called to f/u covid19, symptom onset 4/3. Pt reports he has been symptom-free for 3 days. Last fever was over 3 nights ago. No fever reducing agents over a week ago. Mild cough just early in morning. Pt interested in donating plasma and is already in contact with appropriate program. Discussed w/ pt that I will write him a letter and he can get it later today.

## 2020-04-29 ENCOUNTER — EMERGENCY (EMERGENCY)
Facility: HOSPITAL | Age: 50
LOS: 1 days | Discharge: HOME | End: 2020-04-29
Attending: EMERGENCY MEDICINE | Admitting: EMERGENCY MEDICINE
Payer: MEDICARE

## 2020-04-29 VITALS
HEIGHT: 69 IN | RESPIRATION RATE: 16 BRPM | OXYGEN SATURATION: 94 % | WEIGHT: 315 LBS | DIASTOLIC BLOOD PRESSURE: 82 MMHG | SYSTOLIC BLOOD PRESSURE: 137 MMHG | TEMPERATURE: 98 F | HEART RATE: 110 BPM

## 2020-04-29 DIAGNOSIS — M79.89 OTHER SPECIFIED SOFT TISSUE DISORDERS: ICD-10-CM

## 2020-04-29 DIAGNOSIS — Z98.890 OTHER SPECIFIED POSTPROCEDURAL STATES: Chronic | ICD-10-CM

## 2020-04-29 LAB
ALBUMIN SERPL ELPH-MCNC: 3 G/DL — LOW (ref 3.3–5)
ALP SERPL-CCNC: 107 U/L — SIGNIFICANT CHANGE UP (ref 40–120)
ALT FLD-CCNC: 44 U/L — SIGNIFICANT CHANGE UP (ref 10–45)
ANION GAP SERPL CALC-SCNC: 4 MMOL/L — LOW (ref 5–17)
AST SERPL-CCNC: 37 U/L — SIGNIFICANT CHANGE UP (ref 10–40)
BASOPHILS # BLD AUTO: 0.07 K/UL — SIGNIFICANT CHANGE UP (ref 0–0.2)
BASOPHILS NFR BLD AUTO: 0.8 % — SIGNIFICANT CHANGE UP (ref 0–2)
BILIRUB SERPL-MCNC: 0.1 MG/DL — LOW (ref 0.2–1.2)
BUN SERPL-MCNC: 10 MG/DL — SIGNIFICANT CHANGE UP (ref 7–23)
CALCIUM SERPL-MCNC: 9.1 MG/DL — SIGNIFICANT CHANGE UP (ref 8.4–10.5)
CHLORIDE SERPL-SCNC: 105 MMOL/L — SIGNIFICANT CHANGE UP (ref 96–108)
CO2 SERPL-SCNC: 31 MMOL/L — SIGNIFICANT CHANGE UP (ref 22–31)
CREAT SERPL-MCNC: 0.93 MG/DL — SIGNIFICANT CHANGE UP (ref 0.5–1.3)
EOSINOPHIL # BLD AUTO: 0.45 K/UL — SIGNIFICANT CHANGE UP (ref 0–0.5)
EOSINOPHIL NFR BLD AUTO: 5.1 % — SIGNIFICANT CHANGE UP (ref 0–6)
GLUCOSE SERPL-MCNC: 117 MG/DL — HIGH (ref 70–99)
HCT VFR BLD CALC: 42.9 % — SIGNIFICANT CHANGE UP (ref 39–50)
HGB BLD-MCNC: 14.1 G/DL — SIGNIFICANT CHANGE UP (ref 13–17)
IMM GRANULOCYTES NFR BLD AUTO: 0.6 % — SIGNIFICANT CHANGE UP (ref 0–1.5)
LYMPHOCYTES # BLD AUTO: 2.09 K/UL — SIGNIFICANT CHANGE UP (ref 1–3.3)
LYMPHOCYTES # BLD AUTO: 23.6 % — SIGNIFICANT CHANGE UP (ref 13–44)
MCHC RBC-ENTMCNC: 29 PG — SIGNIFICANT CHANGE UP (ref 27–34)
MCHC RBC-ENTMCNC: 32.9 GM/DL — SIGNIFICANT CHANGE UP (ref 32–36)
MCV RBC AUTO: 88.3 FL — SIGNIFICANT CHANGE UP (ref 80–100)
MONOCYTES # BLD AUTO: 0.69 K/UL — SIGNIFICANT CHANGE UP (ref 0–0.9)
MONOCYTES NFR BLD AUTO: 7.8 % — SIGNIFICANT CHANGE UP (ref 2–14)
NEUTROPHILS # BLD AUTO: 5.5 K/UL — SIGNIFICANT CHANGE UP (ref 1.8–7.4)
NEUTROPHILS NFR BLD AUTO: 62.1 % — SIGNIFICANT CHANGE UP (ref 43–77)
NRBC # BLD: 0 /100 WBCS — SIGNIFICANT CHANGE UP (ref 0–0)
NT-PROBNP SERPL-SCNC: 17 PG/ML — SIGNIFICANT CHANGE UP (ref 0–300)
PLATELET # BLD AUTO: 580 K/UL — HIGH (ref 150–400)
POTASSIUM SERPL-MCNC: 4.4 MMOL/L — SIGNIFICANT CHANGE UP (ref 3.5–5.3)
POTASSIUM SERPL-SCNC: 4.4 MMOL/L — SIGNIFICANT CHANGE UP (ref 3.5–5.3)
PROT SERPL-MCNC: 7.1 G/DL — SIGNIFICANT CHANGE UP (ref 6–8.3)
RBC # BLD: 4.86 M/UL — SIGNIFICANT CHANGE UP (ref 4.2–5.8)
RBC # FLD: 13.4 % — SIGNIFICANT CHANGE UP (ref 10.3–14.5)
SODIUM SERPL-SCNC: 140 MMOL/L — SIGNIFICANT CHANGE UP (ref 135–145)
TROPONIN I SERPL-MCNC: <.017 NG/ML — LOW (ref 0.02–0.06)
WBC # BLD: 8.85 K/UL — SIGNIFICANT CHANGE UP (ref 3.8–10.5)
WBC # FLD AUTO: 8.85 K/UL — SIGNIFICANT CHANGE UP (ref 3.8–10.5)

## 2020-04-29 PROCEDURE — 83880 ASSAY OF NATRIURETIC PEPTIDE: CPT

## 2020-04-29 PROCEDURE — 93010 ELECTROCARDIOGRAM REPORT: CPT

## 2020-04-29 PROCEDURE — 80053 COMPREHEN METABOLIC PANEL: CPT

## 2020-04-29 PROCEDURE — 71045 X-RAY EXAM CHEST 1 VIEW: CPT | Mod: 26

## 2020-04-29 PROCEDURE — 93005 ELECTROCARDIOGRAM TRACING: CPT

## 2020-04-29 PROCEDURE — 99285 EMERGENCY DEPT VISIT HI MDM: CPT

## 2020-04-29 PROCEDURE — 99053 MED SERV 10PM-8AM 24 HR FAC: CPT

## 2020-04-29 PROCEDURE — 99284 EMERGENCY DEPT VISIT MOD MDM: CPT | Mod: 25

## 2020-04-29 PROCEDURE — 71045 X-RAY EXAM CHEST 1 VIEW: CPT

## 2020-04-29 PROCEDURE — 36415 COLL VENOUS BLD VENIPUNCTURE: CPT

## 2020-04-29 PROCEDURE — 84484 ASSAY OF TROPONIN QUANT: CPT

## 2020-04-29 PROCEDURE — 93970 EXTREMITY STUDY: CPT

## 2020-04-29 PROCEDURE — 85027 COMPLETE CBC AUTOMATED: CPT

## 2020-04-29 NOTE — ED PROVIDER NOTE - OBJECTIVE STATEMENT
50 y/o male with h/o recent COVID infection presents to ED c/o swelling of both legs noticed today, pt also c/o that was SOB on to day , no chest pain. pt states since he had COVID recently and since then his oxygen saturation is in range of 92 to 94, denies any chest pain

## 2020-04-29 NOTE — ED ADULT TRIAGE NOTE - CHIEF COMPLAINT QUOTE
c/o swelling left lower leg, had covid and had +antibodies on serology test, states he is worried he has a blood clot

## 2020-04-29 NOTE — ED PROVIDER NOTE - PATIENT PORTAL LINK FT
You can access the FollowMyHealth Patient Portal offered by [facility] by registering at the following website: [email or web address]. By joining [facility] [employer]’s FollowMyHealth portal, you will also be able to view your health information using other applications (apps) compatible with our system.

## 2020-04-29 NOTE — PLAN
[FreeTextEntry1] : Pt educated about high possibility of COVID infection with his hx, educated about self quarantine and hand washing and disinfecting the bathroom, avoidance going to general area like kitchen and living room. importance of hydration emphasized. I advised for balance meal and rest and cont med for fever. patient and his wife verbalized understanding and they will receive a call back tomorrow to reassess his condition. However, he was advised if he gets severe SOB or worsening condition to got to ER.

## 2020-04-29 NOTE — HISTORY OF PRESENT ILLNESS
[Verbal consent obtained from patient] : the patient, [unfilled] [Yes] : yes [No] : no difficulty breathing [None] : none [Anosmia] : anosmia [Stable] : stable [Patient advised to contact office if symptoms progress] : Patient advised to contact office if symptoms progress [Time Spent: ___ minutes] : I have spent [unfilled] minutes with the patient on the telephone [TextBox_8] : 7 [TextBox_28] : resolved [FreeTextEntry1] : I will call pt on 4/20/20 and is completely asymptomatic and write him a letter to return to his work

## 2020-04-29 NOTE — ED ADULT NURSE NOTE - OBJECTIVE STATEMENT
Pt presents to the ED with c/o sob on exertion and b/l LE edema which both started today. Pt denies cp/fevers, n/v/d.

## 2020-04-29 NOTE — ED PROVIDER NOTE - CLINICAL SUMMARY MEDICAL DECISION MAKING FREE TEXT BOX
pt p/w leg swelling and mild SOB, had recent COVID infection  EKG and cxr are normal with no acute findings, US duplex b/l no DVT , pt advised to F/U with PMD

## 2020-04-29 NOTE — PLAN
[FreeTextEntry1] : pt is improving, I advised to continue his self quarantine and be well hydrated and balanced nutrition advised. I advised him to call office if any issues. he can go back to work if 3 days without fever not on med and the other symptoms are improving

## 2020-04-29 NOTE — ED PROVIDER NOTE - PHYSICAL EXAMINATION
General:     NAD, morbidly obese well-appearing  Head:     NC/AT, EOMI, oral mucosa moist  Neck:     supple  Lungs:     CTA b/l, no w/r/r  CVS:     S1S2, RRR, no m/g/r  Abd:     +BS, s/nt/nd, no organomegaly  Ext:    2+ radial and pedal pulses, no c/c/e  Neuro: grossly intact

## 2020-04-29 NOTE — HISTORY OF PRESENT ILLNESS
[Verbal consent obtained from patient] : the patient, [unfilled] [Time Spent: ___ minutes] : I have spent [unfilled] minutes with the patient on the telephone [FreeTextEntry1] : pt called in as he had concern about having Covid 19. His wife with him during conversation on speaker phone and performed a temp check for me. \par He started to have a fever of 103.5 on  Friday and  he is taking Theraflu which is helping with fever. This morning was 102.  The temp was 101.5 during our conversation. \par he has HA but not severe. \par pt said that he had one episode of diarrhea last night but back to normal now, has somehow poor appetite but eating food and drinking enough water to stay hydrated, denies abdominal pain or urinary problem.\par He feels funny with sense of smell but his taste is OK. \par He has cough with some sputum but couldn't describe the color of sputum, \par He denies SOB and wheezing. \par NO runny nose but slight sneezing\par No sore throat\par doesn't recall contact with COVID positive person at work , his wife also denies it.both wearing mask at work and now at home\par He said that has slight body ache and feeling fatigued\par Patient's risk factor is his morbid obesity (BMI of 57) and prediabetes(hb a1c 6.2)\par he feels a little better but concern about the effect of his morbid obesity on his possible COVID

## 2020-04-30 VITALS — HEART RATE: 89 BPM

## 2020-04-30 PROCEDURE — 93970 EXTREMITY STUDY: CPT | Mod: 26

## 2020-04-30 NOTE — PLAN
[FreeTextEntry1] : COVID-19 infection \par \par It is resolving but pt is experiencing off and on dyspnea, he is well educated about the issue due to our current phone visits and reeducating him about the infection and what to monitor. We discussed CXR finding\par \par Thrombocytosis\par \par I reviewed pt's file and noted that this is not  a very new finding and he had have the history of condition on multiple times. We will repeat CBC in 1 week and if still high will refer to hematology. I advised daily dose of aspirin 81 mg\par \par \par Leg edema\par \par Patient stated that it is almost resolved and he was able to go back to work today. I advised leg elevation.\par \par As per patient request I called his wife at [phone] and inform her about his condition.\par

## 2020-04-30 NOTE — HISTORY OF PRESENT ILLNESS
[Verbal consent obtained from patient] : the patient, [unfilled] [Time Spent: ___ minutes] : I have spent [unfilled] minutes with the patient on the telephone [FreeTextEntry1] : Called pt  as pt has called in today and requested a call for a f/u telephone for his COVID-19  and ER self referral last night for leg edema and SOB. he said since discharge his leg edema has decreased and he has no dyspnea but was worry about effect of COVID-19 infection for his situation and wanted to discuss his lab result again. we went over his CXR and blood test result and I provided him in lame language what they meant. he had some haziness on left lower lobe on CXR and his platelet was high and had negative study for DVT on Doppler. He asked me to call his wife because he was at work and he wanted to ensure his wife is aware of his result.

## 2020-05-04 NOTE — HISTORY OF PRESENT ILLNESS
[FreeTextEntry1] : Pt was called back to f/u covid19. Pt reports intermittent b/l anterior shin since he had covid19. Edema has improved with compression stockings. Denies fevers, cough, SOB. Pulse ox ~93-94% on room air. Pt remains stable without further questions.

## 2020-05-14 ENCOUNTER — APPOINTMENT (OUTPATIENT)
Dept: FAMILY MEDICINE | Facility: HOSPITAL | Age: 50
End: 2020-05-14

## 2020-05-14 NOTE — REVIEW OF SYSTEMS
[Fever] : no fever [Chills] : no chills [Chest Pain] : no chest pain [Palpitations] : no palpitations [Frequency] : no frequency [Hematuria] : no hematuria

## 2020-05-14 NOTE — HISTORY OF PRESENT ILLNESS
[FreeTextEntry8] : Telephonic visit given current coronavirus precautions. Verbal consent obtained from patient. \par \par Pt called for lower extremity edema ever since having covid19. Pt reports he is feeling better. Pulse ox on RA is 92-93%. He has just mild SOB now. His main concern is that he is having b/l lower extremity pitting edema w/ previous negative dopplers. Discussed w/ pt caution with water and salt intake. Pt denies vision changes, chest pain, palpitations, flank pain, urinary frequency, hematuria.

## 2020-05-14 NOTE — PLAN
[FreeTextEntry1] : #Prediabetes\par - repeat A1c\par \par #Thrombocytosis\par - repeat CBC\par \par #b/l lower extremtiy edema\par - CMP \par \par Schedule for inperson visit in 2 weeks \par Dr. Whyte present for entire phone call

## 2020-05-14 NOTE — END OF VISIT
[FreeTextEntry3] : I was present during this telephonic visit with resident andpatient [] : Resident

## 2020-05-14 NOTE — ASSESSMENT
[FreeTextEntry1] : 49M w/ prediabetes and thrombocytosis called for f/u prediabetes,thrombocytosis,leg edema s/p covid19 infection.

## 2020-05-14 NOTE — PHYSICAL EXAM
[No Acute Distress] : no acute distress [Normal Voice/Communication] : normal voice/communication [Normal Affect] : the affect was normal [Speech Grossly Normal] : speech grossly normal [Alert and Oriented x3] : oriented to person, place, and time [Normal Insight/Judgement] : insight and judgment were intact

## 2020-05-14 NOTE — REVIEW OF SYSTEMS
[Fever] : no fever [Chest Pain] : no chest pain [Chills] : no chills [Palpitations] : no palpitations [Hematuria] : no hematuria [Frequency] : no frequency

## 2020-05-15 ENCOUNTER — LABORATORY RESULT (OUTPATIENT)
Age: 50
End: 2020-05-15

## 2020-05-18 LAB
ALBUMIN SERPL ELPH-MCNC: 4.1 G/DL
ALP BLD-CCNC: 92 U/L
ALT SERPL-CCNC: 47 U/L
ANION GAP SERPL CALC-SCNC: 11 MMOL/L
AST SERPL-CCNC: 35 U/L
BASOPHILS # BLD AUTO: 0.08 K/UL
BASOPHILS NFR BLD AUTO: 1.1 %
BILIRUB SERPL-MCNC: 0.3 MG/DL
BUN SERPL-MCNC: 15 MG/DL
CALCIUM SERPL-MCNC: 8.9 MG/DL
CHLORIDE SERPL-SCNC: 103 MMOL/L
CO2 SERPL-SCNC: 28 MMOL/L
CREAT SERPL-MCNC: 0.85 MG/DL
EOSINOPHIL # BLD AUTO: 0.39 K/UL
EOSINOPHIL NFR BLD AUTO: 5.3 %
GLUCOSE SERPL-MCNC: 118 MG/DL
HCT VFR BLD CALC: 45.1 %
HGB BLD-MCNC: 14 G/DL
IMM GRANULOCYTES NFR BLD AUTO: 0.5 %
LYMPHOCYTES # BLD AUTO: 2.3 K/UL
LYMPHOCYTES NFR BLD AUTO: 31.3 %
MAN DIFF?: NORMAL
MCHC RBC-ENTMCNC: 28.2 PG
MCHC RBC-ENTMCNC: 31 GM/DL
MCV RBC AUTO: 90.9 FL
MONOCYTES # BLD AUTO: 0.59 K/UL
MONOCYTES NFR BLD AUTO: 8 %
NEUTROPHILS # BLD AUTO: 3.96 K/UL
NEUTROPHILS NFR BLD AUTO: 53.8 %
PLATELET # BLD AUTO: 365 K/UL
POTASSIUM SERPL-SCNC: 4.6 MMOL/L
PROT SERPL-MCNC: 6.8 G/DL
RBC # BLD: 4.96 M/UL
RBC # FLD: 15 %
SODIUM SERPL-SCNC: 141 MMOL/L
WBC # FLD AUTO: 7.36 K/UL

## 2020-10-20 ENCOUNTER — APPOINTMENT (OUTPATIENT)
Dept: FAMILY MEDICINE | Facility: HOSPITAL | Age: 50
End: 2020-10-20

## 2020-10-20 ENCOUNTER — MED ADMIN CHARGE (OUTPATIENT)
Age: 50
End: 2020-10-20

## 2020-10-20 ENCOUNTER — OUTPATIENT (OUTPATIENT)
Dept: OUTPATIENT SERVICES | Facility: HOSPITAL | Age: 50
LOS: 1 days | End: 2020-10-20
Payer: SELF-PAY

## 2020-10-20 VITALS
DIASTOLIC BLOOD PRESSURE: 89 MMHG | WEIGHT: 315 LBS | SYSTOLIC BLOOD PRESSURE: 134 MMHG | BODY MASS INDEX: 56.12 KG/M2 | HEART RATE: 87 BPM | TEMPERATURE: 97.5 F | RESPIRATION RATE: 18 BRPM | OXYGEN SATURATION: 96 %

## 2020-10-20 DIAGNOSIS — Z00.00 ENCOUNTER FOR GENERAL ADULT MEDICAL EXAMINATION WITHOUT ABNORMAL FINDINGS: ICD-10-CM

## 2020-10-20 DIAGNOSIS — Z98.890 OTHER SPECIFIED POSTPROCEDURAL STATES: Chronic | ICD-10-CM

## 2020-10-20 PROCEDURE — G0008: CPT

## 2020-10-20 PROCEDURE — G0463: CPT

## 2020-10-20 NOTE — PLAN
[FreeTextEntry1] : \par \par \par #Health Maintenance: \par - Flu Shot administered today\par - Up to date on all other immunization\par - Last CPE in January 2020\par - Begin colon cancer screening at age> 50\par \par #Hx of COVID19 infection- Spring 2020\par - recovered, no signs of active infection\par - continue routine precautions: social distancing, hand washing hygiene, masks in public\par \par \par #Morbid Obesity\par - Discussed weight loss plan- to be followed up at 3 month visit\par - recommend bariatric surgery referral\par \par \par RTC in January 2021 for CPE\par \par \par Above discussed with

## 2020-10-20 NOTE — ASSESSMENT
[FreeTextEntry1] : Patient is a 49 y/o male with medical history as stated above here for acute visit for flu shot:

## 2020-10-20 NOTE — PHYSICAL EXAM
[No Acute Distress] : no acute distress [No Respiratory Distress] : no respiratory distress  [Clear to Auscultation] : lungs were clear to auscultation bilaterally [Normal Rate] : normal rate  [Regular Rhythm] : with a regular rhythm [Normal S1, S2] : normal S1 and S2 [de-identified] : Morbidly obese appearing

## 2020-10-20 NOTE — REVIEW OF SYSTEMS
[Fever] : no fever [Chills] : no chills [Fatigue] : no fatigue [Chest Pain] : no chest pain [Palpitations] : no palpitations [Shortness Of Breath] : no shortness of breath [Cough] : no cough [Abdominal Pain] : no abdominal pain [Vomiting] : no vomiting [Nausea] : no nausea

## 2020-10-20 NOTE — HISTORY OF PRESENT ILLNESS
[FreeTextEntry8] : Patient is a 51 y/o male with medical history of morbid oebsity, dyslipidemia, MYA on CPAP, COVID illness in spring of this year here for an acute visit for flu shot:\par \par \par Patient states he is overall feeling well. Denies fevers, sore throat, trouble breathing. No sick contacts.\par \par He was seen in January 2020 for CPE.

## 2020-10-22 DIAGNOSIS — Z23 ENCOUNTER FOR IMMUNIZATION: ICD-10-CM

## 2020-11-07 ENCOUNTER — APPOINTMENT (OUTPATIENT)
Dept: FAMILY MEDICINE | Facility: HOSPITAL | Age: 50
End: 2020-11-07

## 2020-11-07 ENCOUNTER — OUTPATIENT (OUTPATIENT)
Dept: OUTPATIENT SERVICES | Facility: HOSPITAL | Age: 50
LOS: 1 days | End: 2020-11-07
Payer: SELF-PAY

## 2020-11-07 VITALS
RESPIRATION RATE: 18 BRPM | HEIGHT: 69 IN | OXYGEN SATURATION: 99 % | TEMPERATURE: 96.7 F | SYSTOLIC BLOOD PRESSURE: 128 MMHG | HEART RATE: 76 BPM | WEIGHT: 315 LBS | BODY MASS INDEX: 46.65 KG/M2 | DIASTOLIC BLOOD PRESSURE: 81 MMHG

## 2020-11-07 DIAGNOSIS — N39.0 URINARY TRACT INFECTION, SITE NOT SPECIFIED: ICD-10-CM

## 2020-11-07 DIAGNOSIS — H18.603 KERATOCONUS, UNSPECIFIED, BILATERAL: ICD-10-CM

## 2020-11-07 DIAGNOSIS — Z86.018 PERSONAL HISTORY OF OTHER BENIGN NEOPLASM: ICD-10-CM

## 2020-11-07 DIAGNOSIS — R20.0 ANESTHESIA OF SKIN: ICD-10-CM

## 2020-11-07 DIAGNOSIS — B35.3 TINEA PEDIS: ICD-10-CM

## 2020-11-07 DIAGNOSIS — Z86.69 PERSONAL HISTORY OF OTHER DISEASES OF THE NERVOUS SYSTEM AND SENSE ORGANS: ICD-10-CM

## 2020-11-07 DIAGNOSIS — K61.0 ANAL ABSCESS: ICD-10-CM

## 2020-11-07 DIAGNOSIS — R82.90 UNSPECIFIED ABNORMAL FINDINGS IN URINE: ICD-10-CM

## 2020-11-07 DIAGNOSIS — Z92.29 PERSONAL HISTORY OF OTHER DRUG THERAPY: ICD-10-CM

## 2020-11-07 DIAGNOSIS — Z00.00 ENCOUNTER FOR GENERAL ADULT MEDICAL EXAMINATION WITHOUT ABNORMAL FINDINGS: ICD-10-CM

## 2020-11-07 DIAGNOSIS — Z87.898 PERSONAL HISTORY OF OTHER SPECIFIED CONDITIONS: ICD-10-CM

## 2020-11-07 DIAGNOSIS — Z87.448 PERSONAL HISTORY OF OTHER DISEASES OF URINARY SYSTEM: ICD-10-CM

## 2020-11-07 DIAGNOSIS — M77.01 MEDIAL EPICONDYLITIS, RIGHT ELBOW: ICD-10-CM

## 2020-11-07 DIAGNOSIS — B96.20 URINARY TRACT INFECTION, SITE NOT SPECIFIED: ICD-10-CM

## 2020-11-07 DIAGNOSIS — J06.9 ACUTE UPPER RESPIRATORY INFECTION, UNSPECIFIED: ICD-10-CM

## 2020-11-07 DIAGNOSIS — Z87.19 PERSONAL HISTORY OF OTHER DISEASES OF THE DIGESTIVE SYSTEM: ICD-10-CM

## 2020-11-07 DIAGNOSIS — Z02.1 ENCOUNTER FOR PRE-EMPLOYMENT EXAMINATION: ICD-10-CM

## 2020-11-07 DIAGNOSIS — Z87.2 PERSONAL HISTORY OF DISEASES OF THE SKIN AND SUBCUTANEOUS TISSUE: ICD-10-CM

## 2020-11-07 DIAGNOSIS — K42.9 UMBILICAL HERNIA W/OUT OBSTRUCTION OR GANGRENE: ICD-10-CM

## 2020-11-07 DIAGNOSIS — Z86.19 PERSONAL HISTORY OF OTHER INFECTIOUS AND PARASITIC DISEASES: ICD-10-CM

## 2020-11-07 DIAGNOSIS — Z87.39 PERSONAL HISTORY OF OTHER DISEASES OF THE MUSCULOSKELETAL SYSTEM AND CONNECTIVE TISSUE: ICD-10-CM

## 2020-11-07 DIAGNOSIS — E78.1 PURE HYPERGLYCERIDEMIA: ICD-10-CM

## 2020-11-07 DIAGNOSIS — R60.0 LOCALIZED EDEMA: ICD-10-CM

## 2020-11-07 DIAGNOSIS — Z86.39 PERSONAL HISTORY OF OTHER ENDOCRINE, NUTRITIONAL AND METABOLIC DISEASE: ICD-10-CM

## 2020-11-07 DIAGNOSIS — Z87.442 PERSONAL HISTORY OF URINARY CALCULI: ICD-10-CM

## 2020-11-07 DIAGNOSIS — Z86.79 PERSONAL HISTORY OF OTHER DISEASES OF THE CIRCULATORY SYSTEM: ICD-10-CM

## 2020-11-07 DIAGNOSIS — Z29.9 ENCOUNTER FOR PROPHYLACTIC MEASURES, UNSPECIFIED: ICD-10-CM

## 2020-11-07 DIAGNOSIS — M25.572 PAIN IN LEFT ANKLE AND JOINTS OF LEFT FOOT: ICD-10-CM

## 2020-11-07 DIAGNOSIS — Z98.890 OTHER SPECIFIED POSTPROCEDURAL STATES: Chronic | ICD-10-CM

## 2020-11-07 PROCEDURE — G0463: CPT

## 2020-11-08 PROBLEM — E78.1 HYPERTRIGLYCERIDEMIA: Status: RESOLVED | Noted: 2017-05-16 | Resolved: 2020-11-08

## 2020-11-08 PROBLEM — Z87.898 HISTORY OF NASAL CONGESTION: Status: RESOLVED | Noted: 2019-02-09 | Resolved: 2020-11-08

## 2020-11-08 PROBLEM — Z87.898 HISTORY OF VERTIGO: Status: RESOLVED | Noted: 2020-04-21 | Resolved: 2020-11-08

## 2020-11-08 PROBLEM — Z87.898 HISTORY OF FEVER: Status: RESOLVED | Noted: 2020-04-20 | Resolved: 2020-11-08

## 2020-11-08 PROBLEM — H18.603 KERATOCONUS, BILATERAL: Status: RESOLVED | Noted: 2017-01-21 | Resolved: 2020-11-08

## 2020-11-08 PROBLEM — R82.90 ABNORMAL URINALYSIS: Status: RESOLVED | Noted: 2017-04-18 | Resolved: 2020-11-08

## 2020-11-08 PROBLEM — Z92.29 HISTORY OF HEPATITIS B VACCINATION: Status: RESOLVED | Noted: 2020-01-17 | Resolved: 2020-11-08

## 2020-11-08 PROBLEM — Z02.1 PRE-EMPLOYMENT EXAMINATION: Status: RESOLVED | Noted: 2020-01-11 | Resolved: 2020-11-08

## 2020-11-08 PROBLEM — Z87.448 HISTORY OF HEMATURIA: Status: RESOLVED | Noted: 2017-03-11 | Resolved: 2020-11-08

## 2020-11-08 PROBLEM — Z92.29 HISTORY OF INFLUENZA VACCINATION: Status: RESOLVED | Noted: 2020-01-11 | Resolved: 2020-11-08

## 2020-11-08 PROBLEM — B35.3 TINEA PEDIS OF BOTH FEET: Status: RESOLVED | Noted: 2017-06-17 | Resolved: 2020-11-08

## 2020-11-08 PROBLEM — Z29.9 ENCOUNTER FOR PREVENTIVE MEASURE: Status: RESOLVED | Noted: 2020-01-11 | Resolved: 2020-11-08

## 2020-11-08 PROBLEM — Z87.39 HISTORY OF MUSCLE SPASM: Status: RESOLVED | Noted: 2019-02-09 | Resolved: 2020-11-08

## 2020-11-08 PROBLEM — J06.9 ACUTE URI: Status: RESOLVED | Noted: 2019-12-30 | Resolved: 2020-11-08

## 2020-11-08 PROBLEM — Z86.018 HISTORY OF LIPOMA OF RIGHT UPPER EXTREMITY: Status: RESOLVED | Noted: 2018-10-13 | Resolved: 2020-11-08

## 2020-11-08 PROBLEM — M25.572 LEFT ANKLE PAIN: Status: RESOLVED | Noted: 2017-06-17 | Resolved: 2020-11-08

## 2020-11-08 PROBLEM — N39.0 E. COLI UTI: Status: RESOLVED | Noted: 2017-04-21 | Resolved: 2020-11-08

## 2020-11-08 NOTE — HISTORY OF PRESENT ILLNESS
[FreeTextEntry8] : 50M with prediabetes, sleep apnea, and HLD presents for decreased hearing of the left ear\par -Drove to [facility] one week ago, [place], felt his ear popping\par -When he got home his left ear felt clogged, a/w decreased hearing and discomfort\par -Reports small amount of white discharge from the left ear the night prior\par -Felt inside his ear with a paperclip and thought he could feel a bump\par -Reports a hx of cysts in legs, back, anus, some of which needed to be excised by physicians. Some have become infected\par -Reports about 30% hearing subjectively\par -No pain if he is not touching the ear. Reports pain with touching the ear

## 2020-11-08 NOTE — PHYSICAL EXAM
[Normal] : no acute distress, well nourished, well developed and well-appearing [Normal Oropharynx] : the oropharynx was normal [de-identified] : Normal external ears b/l. Right ear canal and tympanic membrane normal. Left ear canal erythematous, tender, and with a mass near the opening that obstructs visualization of the tympanic membrane.

## 2020-11-08 NOTE — REVIEW OF SYSTEMS
[Earache] : earache [Fever] : no fever [Chills] : no chills [Nasal Discharge] : no nasal discharge [Sore Throat] : no sore throat

## 2020-11-09 DIAGNOSIS — H93.8X2 OTHER SPECIFIED DISORDERS OF LEFT EAR: ICD-10-CM

## 2020-11-09 DIAGNOSIS — H60.502 UNSPECIFIED ACUTE NONINFECTIVE OTITIS EXTERNA, LEFT EAR: ICD-10-CM

## 2020-12-30 ENCOUNTER — EMERGENCY (EMERGENCY)
Facility: HOSPITAL | Age: 50
LOS: 1 days | Discharge: HOME | End: 2020-12-30
Attending: EMERGENCY MEDICINE | Admitting: EMERGENCY MEDICINE
Payer: SELF-PAY

## 2020-12-30 VITALS
RESPIRATION RATE: 16 BRPM | OXYGEN SATURATION: 97 % | SYSTOLIC BLOOD PRESSURE: 177 MMHG | HEART RATE: 86 BPM | WEIGHT: 315 LBS | HEIGHT: 69 IN | TEMPERATURE: 98 F | DIASTOLIC BLOOD PRESSURE: 90 MMHG

## 2020-12-30 VITALS
SYSTOLIC BLOOD PRESSURE: 146 MMHG | TEMPERATURE: 98 F | OXYGEN SATURATION: 96 % | HEART RATE: 74 BPM | DIASTOLIC BLOOD PRESSURE: 87 MMHG | RESPIRATION RATE: 16 BRPM

## 2020-12-30 DIAGNOSIS — Z98.890 OTHER SPECIFIED POSTPROCEDURAL STATES: Chronic | ICD-10-CM

## 2020-12-30 DIAGNOSIS — M54.2 CERVICALGIA: ICD-10-CM

## 2020-12-30 LAB
ALBUMIN SERPL ELPH-MCNC: 3.3 G/DL — SIGNIFICANT CHANGE UP (ref 3.3–5)
ALP SERPL-CCNC: 103 U/L — SIGNIFICANT CHANGE UP (ref 40–120)
ALT FLD-CCNC: 54 U/L — HIGH (ref 10–45)
ANION GAP SERPL CALC-SCNC: 9 MMOL/L — SIGNIFICANT CHANGE UP (ref 5–17)
AST SERPL-CCNC: 41 U/L — HIGH (ref 10–40)
BASOPHILS # BLD AUTO: 0.08 K/UL — SIGNIFICANT CHANGE UP (ref 0–0.2)
BASOPHILS NFR BLD AUTO: 0.8 % — SIGNIFICANT CHANGE UP (ref 0–2)
BILIRUB SERPL-MCNC: 0.4 MG/DL — SIGNIFICANT CHANGE UP (ref 0.2–1.2)
BUN SERPL-MCNC: 15 MG/DL — SIGNIFICANT CHANGE UP (ref 7–23)
CALCIUM SERPL-MCNC: 8.8 MG/DL — SIGNIFICANT CHANGE UP (ref 8.4–10.5)
CHLORIDE SERPL-SCNC: 105 MMOL/L — SIGNIFICANT CHANGE UP (ref 96–108)
CO2 SERPL-SCNC: 27 MMOL/L — SIGNIFICANT CHANGE UP (ref 22–31)
CREAT SERPL-MCNC: 0.8 MG/DL — SIGNIFICANT CHANGE UP (ref 0.5–1.3)
D DIMER BLD IA.RAPID-MCNC: <150 NG/ML DDU — SIGNIFICANT CHANGE UP
EOSINOPHIL # BLD AUTO: 0.47 K/UL — SIGNIFICANT CHANGE UP (ref 0–0.5)
EOSINOPHIL NFR BLD AUTO: 5 % — SIGNIFICANT CHANGE UP (ref 0–6)
GLUCOSE SERPL-MCNC: 88 MG/DL — SIGNIFICANT CHANGE UP (ref 70–99)
HCT VFR BLD CALC: 45.3 % — SIGNIFICANT CHANGE UP (ref 39–50)
HGB BLD-MCNC: 14.9 G/DL — SIGNIFICANT CHANGE UP (ref 13–17)
IMM GRANULOCYTES NFR BLD AUTO: 0.6 % — SIGNIFICANT CHANGE UP (ref 0–1.5)
LYMPHOCYTES # BLD AUTO: 2.14 K/UL — SIGNIFICANT CHANGE UP (ref 1–3.3)
LYMPHOCYTES # BLD AUTO: 22.6 % — SIGNIFICANT CHANGE UP (ref 13–44)
MCHC RBC-ENTMCNC: 29.4 PG — SIGNIFICANT CHANGE UP (ref 27–34)
MCHC RBC-ENTMCNC: 32.9 GM/DL — SIGNIFICANT CHANGE UP (ref 32–36)
MCV RBC AUTO: 89.3 FL — SIGNIFICANT CHANGE UP (ref 80–100)
MONOCYTES # BLD AUTO: 0.58 K/UL — SIGNIFICANT CHANGE UP (ref 0–0.9)
MONOCYTES NFR BLD AUTO: 6.1 % — SIGNIFICANT CHANGE UP (ref 2–14)
NEUTROPHILS # BLD AUTO: 6.13 K/UL — SIGNIFICANT CHANGE UP (ref 1.8–7.4)
NEUTROPHILS NFR BLD AUTO: 64.9 % — SIGNIFICANT CHANGE UP (ref 43–77)
NRBC # BLD: 0 /100 WBCS — SIGNIFICANT CHANGE UP (ref 0–0)
NT-PROBNP SERPL-SCNC: 20 PG/ML — SIGNIFICANT CHANGE UP (ref 0–300)
PLATELET # BLD AUTO: 374 K/UL — SIGNIFICANT CHANGE UP (ref 150–400)
POTASSIUM SERPL-MCNC: 4.6 MMOL/L — SIGNIFICANT CHANGE UP (ref 3.5–5.3)
POTASSIUM SERPL-SCNC: 4.6 MMOL/L — SIGNIFICANT CHANGE UP (ref 3.5–5.3)
PROT SERPL-MCNC: 7.3 G/DL — SIGNIFICANT CHANGE UP (ref 6–8.3)
RBC # BLD: 5.07 M/UL — SIGNIFICANT CHANGE UP (ref 4.2–5.8)
RBC # FLD: 13.2 % — SIGNIFICANT CHANGE UP (ref 10.3–14.5)
SODIUM SERPL-SCNC: 141 MMOL/L — SIGNIFICANT CHANGE UP (ref 135–145)
TROPONIN I SERPL-MCNC: <.017 NG/ML — LOW (ref 0.02–0.06)
WBC # BLD: 9.46 K/UL — SIGNIFICANT CHANGE UP (ref 3.8–10.5)
WBC # FLD AUTO: 9.46 K/UL — SIGNIFICANT CHANGE UP (ref 3.8–10.5)

## 2020-12-30 PROCEDURE — 83880 ASSAY OF NATRIURETIC PEPTIDE: CPT

## 2020-12-30 PROCEDURE — 84484 ASSAY OF TROPONIN QUANT: CPT

## 2020-12-30 PROCEDURE — 93005 ELECTROCARDIOGRAM TRACING: CPT

## 2020-12-30 PROCEDURE — 99284 EMERGENCY DEPT VISIT MOD MDM: CPT | Mod: 25

## 2020-12-30 PROCEDURE — 99285 EMERGENCY DEPT VISIT HI MDM: CPT

## 2020-12-30 PROCEDURE — 71045 X-RAY EXAM CHEST 1 VIEW: CPT

## 2020-12-30 PROCEDURE — 85025 COMPLETE CBC W/AUTO DIFF WBC: CPT

## 2020-12-30 PROCEDURE — 80053 COMPREHEN METABOLIC PANEL: CPT

## 2020-12-30 PROCEDURE — 85379 FIBRIN DEGRADATION QUANT: CPT

## 2020-12-30 PROCEDURE — 71045 X-RAY EXAM CHEST 1 VIEW: CPT | Mod: 26

## 2020-12-30 PROCEDURE — 93010 ELECTROCARDIOGRAM REPORT: CPT

## 2020-12-30 PROCEDURE — 36415 COLL VENOUS BLD VENIPUNCTURE: CPT

## 2020-12-30 RX ORDER — IBUPROFEN 200 MG
1 TABLET ORAL
Qty: 20 | Refills: 0
Start: 2020-12-30 | End: 2021-01-03

## 2020-12-30 RX ORDER — DIAZEPAM 5 MG
5 TABLET ORAL ONCE
Refills: 0 | Status: DISCONTINUED | OUTPATIENT
Start: 2020-12-30 | End: 2020-12-30

## 2020-12-30 RX ORDER — DIAZEPAM 5 MG
1 TABLET ORAL
Qty: 9 | Refills: 0
Start: 2020-12-30 | End: 2021-01-01

## 2020-12-30 RX ADMIN — Medication 5 MILLIGRAM(S): at 13:45

## 2020-12-30 NOTE — ED ADULT NURSE NOTE - CHIEF COMPLAINT QUOTE
"Since the snow storm about 2 weeks ago, I have been having left neck pain that radiates to my left chest and left upper back"  + covid in April

## 2020-12-30 NOTE — ED ADULT NURSE NOTE - OBJECTIVE STATEMENT
49 y/o M, A&Ox4, enters ED w/ c/o L. sided neck pain/ Pt. reports the pain started when he was shoveling snow a few weeks ago. The pain radiates to his L. shoulder and L. chest. Pt. reports a "burning" sensation in the L. chest. Pt. also reports increasing SOB. Pt. in no acute respiratory distress. No abdominal pain. No n/v. No fever/chills. EKG done. Pt. placed on a CM - NSR to the 70s. Safety and comfort provided. Call bell within reach.

## 2020-12-30 NOTE — ED PROVIDER NOTE - CLINICAL SUMMARY MEDICAL DECISION MAKING FREE TEXT BOX
Dr. Mercer: 50M morbidly obese, h/o covid earlier th is year p/w left neck pain left shoulder pain since he shoveled snow 2 weeks ago. Worse with ROM. No chest pain or sob. No nausea or vomiting. No numbness, weakness or tingling in arms or legs. On exam pt is well appearing, nad, +ttp over left trapezius, rrr, ctab, abdo soft/nt/nd. Likely MSK, trop checked given risk factors, dc with symptomatic treatment.

## 2020-12-30 NOTE — ED PROVIDER NOTE - ATTENDING CONTRIBUTION TO CARE
Dr. Mercer: I performed a face to face bedside interview with patient regarding history of present illness, review of symptoms and past medical history. I completed an independent physical exam.  I have discussed patient's plan of care with PA.   I agree with note as stated above, having amended the EMR as needed to reflect my findings.   This includes HISTORY OF PRESENT ILLNESS, HIV, PAST MEDICAL/SURGICAL/FAMILY/SOCIAL HISTORY, ALLERGIES AND HOME MEDICATIONS, REVIEW OF SYSTEMS, PHYSICAL EXAM, and any PROGRESS NOTES during the time I functioned as the attending physician for this patient.    see mdm

## 2020-12-30 NOTE — ED PROVIDER NOTE - MUSCULOSKELETAL, MLM
Spine appears normal, range of motion is not limited, no muscle or joint tenderness. FROM all joints. neck FROM

## 2020-12-30 NOTE — ED PROVIDER NOTE - OBJECTIVE STATEMENT
pt 49 yo obese male covid in april c/o a few days of left neck and shoulder pain worse with movement since he shoveled snow. pain improves with nsaids

## 2020-12-30 NOTE — ED ADULT NURSE NOTE - NSIMPLEMENTINTERV_GEN_ALL_ED
Implemented All Universal Safety Interventions:  [place] to call system. Call bell, personal items and telephone within reach. Instruct patient to call for assistance. Room bathroom lighting operational. Non-slip footwear when patient is off stretcher. Physically safe environment: no spills, clutter or unnecessary equipment. Stretcher in lowest position, wheels locked, appropriate side rails in place.

## 2020-12-30 NOTE — ED PROVIDER NOTE - INTERPRETATION
Linwood Chi   3/22/2017 9:45 AM   Anticoagulation Therapy Visit    Description:  87 year old male   Provider:  MIRANDA ANTI COAG   Department:  [facility]           INR as of 3/22/2017     Today's INR 2.8      Anticoagulation Summary as of 3/22/2017     INR goal 2.0-3.0   Today's INR 2.8   Full instructions 5 mg on Mon; 2.5 mg all other days   Next INR check 5/3/2017    Indications   Long-term (current) use of anticoagulants [Z79.01] [Z79.01]  Atrial fibrillation (HCC) [I48.91] [I48.91]         Your next Anticoagulation Clinic appointment(s)     Mar 22, 2017  9:45 AM CDT   Anticoagulation Visit with  ANTI COAG   [facility] ([facility]    [address] [address] [phone]            May 03, 2017  9:45 AM CDT   Anticoagulation Visit with  ANTI COAG   [facility] ([place] [id number] [id number] [phone]              Contact Numbers     [facility]  Please call [phone] to cancel and/or reschedule your appointment   Please call [phone] with any problems or questions regarding your therapy.        March 2017 Details    Sun Mon Tue Wed Thu Fri Sat        1               2               3               4                 5               6               7               8               9               10               11                 12               13               14               15               16               17               18                 19               20               21               22      2.5 mg   See details      23      2.5 mg         24      2.5 mg         25      2.5 mg           26      2.5 mg         27      5 mg         28      2.5 mg         29      2.5 mg         30      2.5 mg         31      2.5 mg           Date Details   03/22 This INR check               How to take your warfarin dose     To take:  2.5 mg Take 0.5 of a 5 mg tablet.    To take:  5 mg  Take 1 of the 5 mg tablets.           April 2017 Details    Sun Mon Tue Wed Thu Fri Sat           1      2.5 mg           2      2.5 mg         3      5 mg         4      2.5 mg         5      2.5 mg         6      2.5 mg         7      2.5 mg         8      2.5 mg           9      2.5 mg         10      5 mg         11      2.5 mg         12      2.5 mg         13      2.5 mg         14      2.5 mg         15      2.5 mg           16      2.5 mg         17      5 mg         18      2.5 mg         19      2.5 mg         20      2.5 mg         21      2.5 mg         22      2.5 mg           23      2.5 mg         24      5 mg         25      2.5 mg         26      2.5 mg         27      2.5 mg         28      2.5 mg         29      2.5 mg           30      2.5 mg                Date Details   No additional details            How to take your warfarin dose     To take:  2.5 mg Take 0.5 of a 5 mg tablet.    To take:  5 mg Take 1 of the 5 mg tablets.           May 2017 Details    Sun Mon Tue Wed Thu Fri Sat      1      5 mg         2      2.5 mg         3            4               5               6                 7               8               9               10               11               12               13                 14               15               16               17               18               19               20                 21               22               23               24               25               26               27                 28               29               30               31                   Date Details   No additional details    Date of next INR:  5/3/2017         How to take your warfarin dose     To take:  2.5 mg Take 0.5 of a 5 mg tablet.    To take:  5 mg Take 1 of the 5 mg tablets.            normal sinus rhythm

## 2020-12-30 NOTE — ED PROVIDER NOTE - PATIENT PORTAL LINK FT
You can access the FollowMyHealth Patient Portal offered by [facility] by registering at the following website: [email or web address] By joining [facility]’s FollowMyHealth portal, you will also be able to view your health information using other applications (apps) compatible with our system.

## 2020-12-31 ENCOUNTER — NON-APPOINTMENT (OUTPATIENT)
Age: 50
End: 2020-12-31

## 2021-01-04 ENCOUNTER — APPOINTMENT (OUTPATIENT)
Dept: OTOLARYNGOLOGY | Facility: CLINIC | Age: 51
End: 2021-01-04

## 2021-01-09 ENCOUNTER — APPOINTMENT (OUTPATIENT)
Dept: FAMILY MEDICINE | Facility: HOSPITAL | Age: 51
End: 2021-01-09

## 2021-01-09 ENCOUNTER — OUTPATIENT (OUTPATIENT)
Dept: OUTPATIENT SERVICES | Facility: HOSPITAL | Age: 51
LOS: 1 days | End: 2021-01-09
Payer: SELF-PAY

## 2021-01-09 VITALS
SYSTOLIC BLOOD PRESSURE: 146 MMHG | RESPIRATION RATE: 14 BRPM | TEMPERATURE: 96.9 F | HEART RATE: 66 BPM | OXYGEN SATURATION: 98 % | DIASTOLIC BLOOD PRESSURE: 87 MMHG

## 2021-01-09 DIAGNOSIS — Z98.890 OTHER SPECIFIED POSTPROCEDURAL STATES: Chronic | ICD-10-CM

## 2021-01-09 DIAGNOSIS — Z00.00 ENCOUNTER FOR GENERAL ADULT MEDICAL EXAMINATION WITHOUT ABNORMAL FINDINGS: ICD-10-CM

## 2021-01-09 PROCEDURE — 85025 COMPLETE CBC W/AUTO DIFF WBC: CPT

## 2021-01-09 PROCEDURE — 83036 HEMOGLOBIN GLYCOSYLATED A1C: CPT

## 2021-01-09 PROCEDURE — 80053 COMPREHEN METABOLIC PANEL: CPT

## 2021-01-09 PROCEDURE — 80061 LIPID PANEL: CPT

## 2021-01-09 PROCEDURE — 84443 ASSAY THYROID STIM HORMONE: CPT

## 2021-01-09 PROCEDURE — G0463: CPT

## 2021-01-09 RX ORDER — AMOXICILLIN AND CLAVULANATE POTASSIUM 875; 125 MG/1; MG/1
875-125 TABLET, COATED ORAL
Qty: 14 | Refills: 0 | Status: DISCONTINUED | COMMUNITY
Start: 2020-11-07 | End: 2021-01-09

## 2021-01-12 DIAGNOSIS — M25.512 PAIN IN LEFT SHOULDER: ICD-10-CM

## 2021-01-12 DIAGNOSIS — E66.01 MORBID (SEVERE) OBESITY DUE TO EXCESS CALORIES: ICD-10-CM

## 2021-01-27 ENCOUNTER — OUTPATIENT (OUTPATIENT)
Dept: OUTPATIENT SERVICES | Facility: HOSPITAL | Age: 51
LOS: 1 days | End: 2021-01-27

## 2021-01-27 ENCOUNTER — APPOINTMENT (OUTPATIENT)
Dept: PULMONOLOGY | Facility: CLINIC | Age: 51
End: 2021-01-27
Payer: COMMERCIAL

## 2021-01-27 VITALS
TEMPERATURE: 97.8 F | SYSTOLIC BLOOD PRESSURE: 142 MMHG | RESPIRATION RATE: 15 BRPM | DIASTOLIC BLOOD PRESSURE: 89 MMHG | HEART RATE: 82 BPM | WEIGHT: 315 LBS | OXYGEN SATURATION: 94 % | HEIGHT: 69 IN | BODY MASS INDEX: 46.65 KG/M2

## 2021-01-27 DIAGNOSIS — Z98.890 OTHER SPECIFIED POSTPROCEDURAL STATES: Chronic | ICD-10-CM

## 2021-01-27 DIAGNOSIS — Z00.00 ENCOUNTER FOR GENERAL ADULT MEDICAL EXAMINATION WITHOUT ABNORMAL FINDINGS: ICD-10-CM

## 2021-01-27 PROCEDURE — 99203 OFFICE O/P NEW LOW 30 MIN: CPT

## 2021-01-27 NOTE — HISTORY OF PRESENT ILLNESS
[TextBox_4] : 50 year old male with morbid obesity presents for initial evaluation of post COIVD pneumonia.  He had COVID back in April- unsure how he was exposed.  There were times when his oxygen saturations would drop below 88% but he never went to the hospital.  He never had home O2 either.  He also had high fevers 103 or 102F.  The virus lasted about 3 weeks and then he slowly began to feel better.\par Currently, he feels short of breath at times and still has bouts of fatigue.  He also uses a CPAP machine, which he has been using since 2002 and has had no follow up.  His oxygen saturations at rest are anywhere from 95-93%.  He has never checked his oxygen saturation with exertion.\par He denies chest discomfort, cough, wheezing, myalgias, or fevers at this time.

## 2021-01-27 NOTE — PHYSICAL EXAM
[No Acute Distress] : no acute distress [Normal Rate/Rhythm] : normal rate/rhythm [Normal S1, S2] : normal s1, s2 [No Murmurs] : no murmurs [No Resp Distress] : no resp distress [Clear to Auscultation Bilaterally] : clear to auscultation bilaterally [No Clubbing] : no clubbing [No Cyanosis] : no cyanosis [No Edema] : no edema [Normal Color/ Pigmentation] : normal color/ pigmentation [No Focal Deficits] : no focal deficits [Oriented x3] : oriented x3 [Normal Affect] : normal affect

## 2021-01-27 NOTE — ASSESSMENT
[FreeTextEntry1] : 50 year old male with morbid obesity presents for initial evaluation of post COVID pneumonia.  Will need to obtain lung functions as well as repeat chest xray.  Will also need to go for a repeat sleep titration study.

## 2021-01-29 ENCOUNTER — OUTPATIENT (OUTPATIENT)
Dept: OUTPATIENT SERVICES | Facility: HOSPITAL | Age: 51
LOS: 1 days | End: 2021-01-29
Payer: MEDICARE

## 2021-01-29 ENCOUNTER — APPOINTMENT (OUTPATIENT)
Dept: RADIOLOGY | Facility: CLINIC | Age: 51
End: 2021-01-29
Payer: COMMERCIAL

## 2021-01-29 DIAGNOSIS — Z98.890 OTHER SPECIFIED POSTPROCEDURAL STATES: Chronic | ICD-10-CM

## 2021-01-29 DIAGNOSIS — U07.1 COVID-19: ICD-10-CM

## 2021-01-29 PROCEDURE — 71046 X-RAY EXAM CHEST 2 VIEWS: CPT | Mod: 26

## 2021-01-29 PROCEDURE — 71046 X-RAY EXAM CHEST 2 VIEWS: CPT

## 2021-02-01 LAB
ALBUMIN SERPL ELPH-MCNC: 4.4 G/DL
ALP BLD-CCNC: 106 U/L
ALT SERPL-CCNC: 44 U/L
ANION GAP SERPL CALC-SCNC: 22 MMOL/L
AST SERPL-CCNC: 32 U/L
BASOPHILS # BLD AUTO: 0.09 K/UL
BASOPHILS NFR BLD AUTO: 1 %
BILIRUB SERPL-MCNC: 0.4 MG/DL
BUN SERPL-MCNC: 16 MG/DL
CALCIUM SERPL-MCNC: 9.4 MG/DL
CHLORIDE SERPL-SCNC: 105 MMOL/L
CHOLEST SERPL-MCNC: 201 MG/DL
CO2 SERPL-SCNC: 17 MMOL/L
CREAT SERPL-MCNC: 0.98 MG/DL
EOSINOPHIL # BLD AUTO: 0.32 K/UL
EOSINOPHIL NFR BLD AUTO: 3.7 %
ESTIMATED AVERAGE GLUCOSE: 140 MG/DL
GLUCOSE SERPL-MCNC: 94 MG/DL
HBA1C MFR BLD HPLC: 6.5 %
HCT VFR BLD CALC: 49.6 %
HDLC SERPL-MCNC: 38 MG/DL
HGB BLD-MCNC: 15.8 G/DL
IMM GRANULOCYTES NFR BLD AUTO: 0.3 %
LDLC SERPL CALC-MCNC: 140 MG/DL
LYMPHOCYTES # BLD AUTO: 2.34 K/UL
LYMPHOCYTES NFR BLD AUTO: 26.9 %
MAN DIFF?: NORMAL
MCHC RBC-ENTMCNC: 28.8 PG
MCHC RBC-ENTMCNC: 31.9 GM/DL
MCV RBC AUTO: 90.5 FL
MONOCYTES # BLD AUTO: 0.6 K/UL
MONOCYTES NFR BLD AUTO: 6.9 %
NEUTROPHILS # BLD AUTO: 5.31 K/UL
NEUTROPHILS NFR BLD AUTO: 61.2 %
NONHDLC SERPL-MCNC: 163 MG/DL
PLATELET # BLD AUTO: 419 K/UL
POTASSIUM SERPL-SCNC: 5.1 MMOL/L
PROT SERPL-MCNC: 6.9 G/DL
RBC # BLD: 5.48 M/UL
RBC # FLD: 13.5 %
SODIUM SERPL-SCNC: 143 MMOL/L
TRIGL SERPL-MCNC: 116 MG/DL
TSH SERPL-ACNC: 2.6 UIU/ML
WBC # FLD AUTO: 8.69 K/UL

## 2021-02-02 ENCOUNTER — NON-APPOINTMENT (OUTPATIENT)
Age: 51
End: 2021-02-02

## 2021-02-03 ENCOUNTER — APPOINTMENT (OUTPATIENT)
Dept: PULMONOLOGY | Facility: CLINIC | Age: 51
End: 2021-02-03
Payer: COMMERCIAL

## 2021-02-03 VITALS
BODY MASS INDEX: 46.65 KG/M2 | OXYGEN SATURATION: 95 % | SYSTOLIC BLOOD PRESSURE: 165 MMHG | TEMPERATURE: 97.5 F | WEIGHT: 315 LBS | DIASTOLIC BLOOD PRESSURE: 93 MMHG | HEIGHT: 69 IN | HEART RATE: 78 BPM

## 2021-02-03 PROCEDURE — 99213 OFFICE O/P EST LOW 20 MIN: CPT | Mod: GC

## 2021-02-03 NOTE — ASSESSMENT
[FreeTextEntry1] : the patient has 2 issues. He is extraordinarily overweight and he has scarring residual of COVID 19. I suggested a CAT scan of his lung, lung functions, evaluation of his sleep (reviewed chip) and a followup visit with us. I re re irritated and that the only correctable problem may be his weight.

## 2021-02-03 NOTE — HISTORY OF PRESENT ILLNESS
[TextBox_4] : 50-year-old who had novel corona virus infection in the spring of this year. Since that time he feels that he is more dyspneic but he has also gained an additional 20 pounds and he is now 395 pounds. He is known to have sleep apnea and obesity hypoventilation syndrome. A chest radiograph recently demonstrated areas of atelectatic scarring.

## 2021-02-03 NOTE — PHYSICAL EXAM
[No Acute Distress] : no acute distress [Normal Appearance] : normal appearance [No Neck Mass] : no neck mass [Normal Rate/Rhythm] : normal rate/rhythm [Normal S1, S2] : normal s1, s2 [TextBox_2] : morbidly obese [TextBox_68] : decreased excursion

## 2021-02-03 NOTE — REVIEW OF SYSTEMS
[Fatigue] : fatigue [Nasal Congestion] : nasal congestion [SOB on Exertion] : sob on exertion [Negative] : Cardiovascular

## 2021-02-05 NOTE — ASSESSMENT
[FreeTextEntry1] : \par Patient is a 51 y/o male with medical history as stated above here for acute visit for shoulder pain:

## 2021-02-05 NOTE — HISTORY OF PRESENT ILLNESS
[FreeTextEntry8] : Patient is a 49 y/o male with a medical history of morbid obesity, Sleep Apnea on CPAP, COVID19 infection requiring hospitalization presenting for an acute/follow up visit for left sided neck and shoulder/back pain:\par \par He states that since his hospitalization for COVID19 back in April, he has been having intermittent pain of his left shoulder, rated 7-8/10 worse during day, better at night when arm is rested on bed. He had gone to the ED to rule out ACS, workup negative and was sent home on motrin which he has been using and has been helping. He has had steroid injection in the past which has helped. \par \par Of note on chest CXR from the ED found to mild right sided atelectasis- he denies chest pain, shortness of breat, cough\par \par

## 2021-02-05 NOTE — PHYSICAL EXAM
[No Acute Distress] : no acute distress [No Respiratory Distress] : no respiratory distress  [Clear to Auscultation] : lungs were clear to auscultation bilaterally [Normal Rate] : normal rate  [Regular Rhythm] : with a regular rhythm [Normal S1, S2] : normal S1 and S2 [Soft] : abdomen soft [Non Tender] : non-tender [Non-distended] : non-distended [Normal Bowel Sounds] : normal bowel sounds [No Joint Swelling] : no joint swelling [Normal Affect] : the affect was normal [Normal Insight/Judgement] : insight and judgment were intact [de-identified] : Obese

## 2021-02-05 NOTE — REVIEW OF SYSTEMS
[Joint Pain] : joint pain [Fever] : no fever [Chills] : no chills [Fatigue] : no fatigue [Chest Pain] : no chest pain [Palpitations] : no palpitations [Shortness Of Breath] : no shortness of breath [Wheezing] : no wheezing [Cough] : no cough [Abdominal Pain] : no abdominal pain [Nausea] : no nausea [Vomiting] : no vomiting

## 2021-02-05 NOTE — PLAN
[FreeTextEntry1] : \par \par \par #Left Sided Shoulder Pain\par - Likely due to body habitus\par - Will prescribe motrin 600 mg PRN, cyclobenzaprine, massage\par - Will hold on PT for now as per patient preference\par - Re-assess at next visit\par \par \par #Morbid Obesity\par - Nutritionist referral\par - Consider bariatric surgery, patient deferring at this time\par \par \par #Sleep Apnea on CPAP\par - Recent cxr findings of atelectasis- likely due to obesity hypoventilation\par - Will referr to Pulm\par \par \par #HCM\par - Will order labs\par - Flu shot already received\par \par RTC in 1-2 weeks for CPE/follow up blood work\par \par Above discussed with Dr. Box

## 2021-02-06 ENCOUNTER — OUTPATIENT (OUTPATIENT)
Dept: OUTPATIENT SERVICES | Facility: HOSPITAL | Age: 51
LOS: 1 days | End: 2021-02-06
Payer: SELF-PAY

## 2021-02-06 ENCOUNTER — APPOINTMENT (OUTPATIENT)
Dept: FAMILY MEDICINE | Facility: HOSPITAL | Age: 51
End: 2021-02-06

## 2021-02-06 VITALS
DIASTOLIC BLOOD PRESSURE: 90 MMHG | HEART RATE: 66 BPM | SYSTOLIC BLOOD PRESSURE: 130 MMHG | TEMPERATURE: 98 F | HEIGHT: 69 IN | WEIGHT: 315 LBS | BODY MASS INDEX: 46.65 KG/M2 | RESPIRATION RATE: 15 BRPM | OXYGEN SATURATION: 97 %

## 2021-02-06 DIAGNOSIS — Z98.890 OTHER SPECIFIED POSTPROCEDURAL STATES: Chronic | ICD-10-CM

## 2021-02-06 DIAGNOSIS — H60.502 UNSPECIFIED ACUTE NONINFECTIVE OTITIS EXTERNA, LEFT EAR: ICD-10-CM

## 2021-02-06 DIAGNOSIS — Z00.00 ENCOUNTER FOR GENERAL ADULT MEDICAL EXAMINATION WITHOUT ABNORMAL FINDINGS: ICD-10-CM

## 2021-02-06 DIAGNOSIS — U07.1 COVID-19: ICD-10-CM

## 2021-02-06 PROCEDURE — G0463: CPT

## 2021-02-06 RX ORDER — CYCLOBENZAPRINE HYDROCHLORIDE 5 MG/1
5 TABLET, FILM COATED ORAL
Qty: 10 | Refills: 0 | Status: DISCONTINUED | COMMUNITY
Start: 2021-01-09 | End: 2021-02-06

## 2021-02-08 NOTE — PHYSICAL EXAM
[No Acute Distress] : no acute distress [No Respiratory Distress] : no respiratory distress  [Clear to Auscultation] : lungs were clear to auscultation bilaterally [Normal Rate] : normal rate  [Regular Rhythm] : with a regular rhythm [Normal S1, S2] : normal S1 and S2 [No Murmur] : no murmur heard [Soft] : abdomen soft [Non Tender] : non-tender [Normal Bowel Sounds] : normal bowel sounds [Normal Affect] : the affect was normal [Normal Insight/Judgement] : insight and judgment were intact [de-identified] : obese body habitus [de-identified] : obese abdomen

## 2021-02-08 NOTE — END OF VISIT
[] : Resident [FreeTextEntry3] : 51 yo M with morbid obesity, prediabetes\par a1c 6.5 today, first reading meeting criteria for diabetes, will repeat next visit and with 2 readings >6.5 can make formal diagnosis of diabetes \par Start metformin today 500mg qd. Will work with patient to increase pending any AE \par Return in 2 weeks for escalation of metformin and repeat a1c

## 2021-02-08 NOTE — HISTORY OF PRESENT ILLNESS
[FreeTextEntry1] : 4 [de-identified] : 49 yo male with morbid obesity (BMI 58), sleep apnea (on night CPAP), previous hospitalization in April for COVID-PNA, HLD, preDM, Gout, presenting for follow up re elevation in A1C level (from 6.2%->6.5%). Pt reports continuous work on his diet at home, reports making leafy vegetables and fruits a bigger portion of his meals, reducing amount of fast foods being consumed, and his wife has been helping him on that. Pt also reports attempts with increasing walking as part of his daily activities, however finds himself getting out of breath often. Of note, pt was seen by Pulm, with CT chest ordered (not yet performed), plan also for pt to undergo pulmonary functions test and sleep eval, per review of Pulmonary chart note from 2/3. \par \par Pt denies any acute SOB or chest pain on today's visit. After further discussion with pt and his wife (who joined in via phone conversation) regarding rise in pt's A1C level, pt is willing to initiate pharmacotherapy for preDM management.

## 2021-02-09 DIAGNOSIS — R73.03 PREDIABETES: ICD-10-CM

## 2021-02-20 ENCOUNTER — APPOINTMENT (OUTPATIENT)
Dept: FAMILY MEDICINE | Facility: HOSPITAL | Age: 51
End: 2021-02-20

## 2021-02-20 ENCOUNTER — OUTPATIENT (OUTPATIENT)
Dept: OUTPATIENT SERVICES | Facility: HOSPITAL | Age: 51
LOS: 1 days | End: 2021-02-20
Payer: SELF-PAY

## 2021-02-20 VITALS
OXYGEN SATURATION: 98 % | TEMPERATURE: 96.9 F | RESPIRATION RATE: 14 BRPM | DIASTOLIC BLOOD PRESSURE: 77 MMHG | HEART RATE: 63 BPM | SYSTOLIC BLOOD PRESSURE: 147 MMHG

## 2021-02-20 DIAGNOSIS — Z98.890 OTHER SPECIFIED POSTPROCEDURAL STATES: Chronic | ICD-10-CM

## 2021-02-20 DIAGNOSIS — Z00.00 ENCOUNTER FOR GENERAL ADULT MEDICAL EXAMINATION WITHOUT ABNORMAL FINDINGS: ICD-10-CM

## 2021-02-20 PROCEDURE — 36415 COLL VENOUS BLD VENIPUNCTURE: CPT

## 2021-02-20 PROCEDURE — 83036 HEMOGLOBIN GLYCOSYLATED A1C: CPT

## 2021-02-20 PROCEDURE — G0463: CPT

## 2021-02-20 RX ORDER — IBUPROFEN 600 MG/1
600 TABLET, FILM COATED ORAL 3 TIMES DAILY
Qty: 20 | Refills: 0 | Status: DISCONTINUED | COMMUNITY
Start: 2021-01-09 | End: 2021-02-20

## 2021-02-20 NOTE — REVIEW OF SYSTEMS
[Nausea] : no nausea [Constipation] : no constipation [Diarrhea] : no diarrhea [Vomiting] : no vomiting [Negative] : Neurological [FreeTextEntry7] : RLQ pain now resolved

## 2021-02-20 NOTE — HISTORY OF PRESENT ILLNESS
[de-identified] : 51 yo male presenting for follow up after initiation of Metformin 2 weeks ago for elevated A1C of 6.5%. Pt reports taking metformin for 5 days daily, however started experiencing RLQ discomfort, for which he stopped taking metfomin 3 days ago. Reports the pain was localized to the RLQ with no radiation anywhere else, was dull-achy in nature, ~3/10 in intensity, no associated n/v, diarrhea, fever, dysuria or hematuria. Pt reports not eating any new food or spoiled food. States pain resolved right away after stopping metformin. Currently does not have any pain. Pt has h/o renal stone in 2012 for which he states the pain associated with renal stone was worse.

## 2021-02-20 NOTE — PHYSICAL EXAM
[No Acute Distress] : no acute distress [Well-Appearing] : well-appearing [No Respiratory Distress] : no respiratory distress  [Clear to Auscultation] : lungs were clear to auscultation bilaterally [Normal Rate] : normal rate  [Regular Rhythm] : with a regular rhythm [Normal S1, S2] : normal S1 and S2 [No Murmur] : no murmur heard [Soft] : abdomen soft [Non Tender] : non-tender [No Masses] : no abdominal mass palpated [Normal Bowel Sounds] : normal bowel sounds [Grossly Normal Strength/Tone] : grossly normal strength/tone [No Focal Deficits] : no focal deficits [de-identified] : morbidly obese body habitus [de-identified] : RLQ palpation without tenderness, guarding or rigidity, no rebound tenderness; no tenderness in rest of abdominal quadrants; skin of abdominal wall intact; no palpable masses however difficult exam overall given obese abdomen, no bulging mass however noted of groin

## 2021-02-22 DIAGNOSIS — R73.03 PREDIABETES: ICD-10-CM

## 2021-02-22 DIAGNOSIS — Z09 ENCOUNTER FOR FOLLOW-UP EXAMINATION AFTER COMPLETED TREATMENT FOR CONDITIONS OTHER THAN MALIGNANT NEOPLASM: ICD-10-CM

## 2021-02-26 LAB
ESTIMATED AVERAGE GLUCOSE: 131 MG/DL
HBA1C MFR BLD HPLC: 6.2 %

## 2021-05-15 ENCOUNTER — OUTPATIENT (OUTPATIENT)
Dept: OUTPATIENT SERVICES | Facility: HOSPITAL | Age: 51
LOS: 1 days | End: 2021-05-15
Payer: SELF-PAY

## 2021-05-15 ENCOUNTER — APPOINTMENT (OUTPATIENT)
Dept: FAMILY MEDICINE | Facility: HOSPITAL | Age: 51
End: 2021-05-15

## 2021-05-15 VITALS
RESPIRATION RATE: 18 BRPM | DIASTOLIC BLOOD PRESSURE: 85 MMHG | BODY MASS INDEX: 58.04 KG/M2 | OXYGEN SATURATION: 97 % | HEART RATE: 75 BPM | WEIGHT: 315 LBS | TEMPERATURE: 97.3 F | SYSTOLIC BLOOD PRESSURE: 141 MMHG

## 2021-05-15 DIAGNOSIS — Z98.890 OTHER SPECIFIED POSTPROCEDURAL STATES: Chronic | ICD-10-CM

## 2021-05-15 DIAGNOSIS — Z00.00 ENCOUNTER FOR GENERAL ADULT MEDICAL EXAMINATION WITHOUT ABNORMAL FINDINGS: ICD-10-CM

## 2021-05-15 PROCEDURE — G0463: CPT

## 2021-05-16 NOTE — PHYSICAL EXAM
[No Acute Distress] : no acute distress [Normal Voice/Communication] : normal voice/communication [Normal] : soft, non-tender, non-distended, no masses palpated, no HSM and normal bowel sounds [Comprehensive Foot Exam Normal] : Right and left foot were examined and both feet are normal. No ulcers in either foot. Toes are normal and with full ROM.  Normal tactile sensation with monofilament testing throughout both feet

## 2021-05-17 DIAGNOSIS — E66.01 MORBID (SEVERE) OBESITY DUE TO EXCESS CALORIES: ICD-10-CM

## 2021-05-17 DIAGNOSIS — M10.9 GOUT, UNSPECIFIED: ICD-10-CM

## 2021-05-17 DIAGNOSIS — G47.33 OBSTRUCTIVE SLEEP APNEA (ADULT) (PEDIATRIC): ICD-10-CM

## 2021-05-17 DIAGNOSIS — K43.9 VENTRAL HERNIA WITHOUT OBSTRUCTION OR GANGRENE: ICD-10-CM

## 2021-05-17 DIAGNOSIS — R73.03 PREDIABETES: ICD-10-CM

## 2021-05-17 DIAGNOSIS — E78.5 HYPERLIPIDEMIA, UNSPECIFIED: ICD-10-CM

## 2021-06-19 NOTE — HISTORY OF PRESENT ILLNESS
[FreeTextEntry1] : DM Follow Up  [de-identified] : \par \par 50M with a PMH significant for Morbid Obesity, MYA, Thrombocytosis and Pre-DM with recent A1C measuring 6.2%. He was last seen in February of this year where he was initiated on Metformin. He returns to clinic today for follow up. Pt states that he has been compliant to the above regimen. Pt states that his diet over the last few months have consisted of mostly protien and soups, pt states he has been monitoring his carbohydrate intake and denies any GI intolerances to Metformin. On review of Systems her states he has been experiencing "pins and needles" in his lower extremities. He dates the onset as a few weeks prior,  he denies polyuria, polydipsia, changes in vision, Chest pain, HA, loss of sensation, with remaining ROS unremarkable.\par \par \par

## 2021-08-02 ENCOUNTER — APPOINTMENT (OUTPATIENT)
Dept: FAMILY MEDICINE | Facility: HOSPITAL | Age: 51
End: 2021-08-02
Payer: SELF-PAY

## 2021-08-02 ENCOUNTER — RESULT REVIEW (OUTPATIENT)
Age: 51
End: 2021-08-02

## 2021-08-02 ENCOUNTER — OUTPATIENT (OUTPATIENT)
Dept: OUTPATIENT SERVICES | Facility: HOSPITAL | Age: 51
LOS: 1 days | End: 2021-08-02
Payer: SELF-PAY

## 2021-08-02 VITALS
OXYGEN SATURATION: 98 % | SYSTOLIC BLOOD PRESSURE: 129 MMHG | RESPIRATION RATE: 18 BRPM | TEMPERATURE: 97.6 F | DIASTOLIC BLOOD PRESSURE: 84 MMHG | HEART RATE: 70 BPM | HEIGHT: 69 IN | BODY MASS INDEX: 46.65 KG/M2 | WEIGHT: 315 LBS

## 2021-08-02 DIAGNOSIS — Z00.00 ENCOUNTER FOR GENERAL ADULT MEDICAL EXAMINATION WITHOUT ABNORMAL FINDINGS: ICD-10-CM

## 2021-08-02 DIAGNOSIS — Z98.890 OTHER SPECIFIED POSTPROCEDURAL STATES: Chronic | ICD-10-CM

## 2021-08-02 PROCEDURE — G0463: CPT

## 2021-08-02 PROCEDURE — 73110 X-RAY EXAM OF WRIST: CPT | Mod: 26,RT

## 2021-08-02 PROCEDURE — 73110 X-RAY EXAM OF WRIST: CPT

## 2021-08-04 NOTE — HISTORY OF PRESENT ILLNESS
[FreeTextEntry8] : 51 y/o male w/ PMH of morbid obesity, MYA, thrombocytosis and prediabetes presents to clinic due to one week of right sided wrist pain. Per patient no trauma. Patient uses computer most of the day which he believes may be contributing to his pain. No history of carpal tunnel syndrome or ulnar neuropathy. Pain similar throughout the day. Patient has not tried any pain relievers. Patient denies fevers, chills, CP, SOB.

## 2021-08-04 NOTE — PLAN
[FreeTextEntry1] : # Arthralgia of the right wrist  likely 2/2 to de Querveins tenosynovitis\par - xray of right wrist ordered to r/o fracture or osteoarthritis\par - CTS and ulnar neuropathy less likely as no numbness tingling along median nerve and ulnar nerve respectively\par - continue wrist brace\par - naproxen 500mg BID for 5-7 days prescribed\par \par RTC in 1 week for wrist pain follow up

## 2021-08-11 ENCOUNTER — APPOINTMENT (OUTPATIENT)
Dept: FAMILY MEDICINE | Facility: HOSPITAL | Age: 51
End: 2021-08-11

## 2021-08-11 ENCOUNTER — OUTPATIENT (OUTPATIENT)
Dept: OUTPATIENT SERVICES | Facility: HOSPITAL | Age: 51
LOS: 1 days | End: 2021-08-11
Payer: SELF-PAY

## 2021-08-11 VITALS — SYSTOLIC BLOOD PRESSURE: 140 MMHG | DIASTOLIC BLOOD PRESSURE: 90 MMHG

## 2021-08-11 VITALS
WEIGHT: 315 LBS | TEMPERATURE: 98.6 F | HEIGHT: 69 IN | RESPIRATION RATE: 18 BRPM | SYSTOLIC BLOOD PRESSURE: 155 MMHG | OXYGEN SATURATION: 95 % | DIASTOLIC BLOOD PRESSURE: 95 MMHG | BODY MASS INDEX: 46.65 KG/M2 | HEART RATE: 115 BPM

## 2021-08-11 DIAGNOSIS — Z00.00 ENCOUNTER FOR GENERAL ADULT MEDICAL EXAMINATION WITHOUT ABNORMAL FINDINGS: ICD-10-CM

## 2021-08-11 DIAGNOSIS — Z98.890 OTHER SPECIFIED POSTPROCEDURAL STATES: Chronic | ICD-10-CM

## 2021-08-11 PROCEDURE — 36415 COLL VENOUS BLD VENIPUNCTURE: CPT

## 2021-08-11 PROCEDURE — 80053 COMPREHEN METABOLIC PANEL: CPT

## 2021-08-11 PROCEDURE — 80061 LIPID PANEL: CPT

## 2021-08-11 PROCEDURE — G0463: CPT

## 2021-08-11 PROCEDURE — 82043 UR ALBUMIN QUANTITATIVE: CPT

## 2021-08-11 PROCEDURE — 85025 COMPLETE CBC W/AUTO DIFF WBC: CPT

## 2021-08-11 PROCEDURE — 82274 ASSAY TEST FOR BLOOD FECAL: CPT

## 2021-08-11 NOTE — REVIEW OF SYSTEMS
[Negative] : Heme/Lymph [Joint Pain] : joint pain [Joint Stiffness] : joint stiffness [Muscle Weakness] : muscle weakness

## 2021-08-11 NOTE — PHYSICAL EXAM
[Well-Appearing] : well-appearing [Normal Sclera/Conjunctiva] : normal sclera/conjunctiva [PERRL] : pupils equal round and reactive to light [EOMI] : extraocular movements intact [Normal Outer Ear/Nose] : the outer ears and nose were normal in appearance [Normal Oropharynx] : the oropharynx was normal [No JVD] : no jugular venous distention [No Lymphadenopathy] : no lymphadenopathy [Supple] : supple [Thyroid Normal, No Nodules] : the thyroid was normal and there were no nodules present [No Murmur] : no murmur heard [No Carotid Bruits] : no carotid bruits [No Abdominal Bruit] : a ~M bruit was not heard ~T in the abdomen [No Varicosities] : no varicosities [No Edema] : there was no peripheral edema [No Palpable Aorta] : no palpable aorta [No Extremity Clubbing/Cyanosis] : no extremity clubbing/cyanosis [Soft] : abdomen soft [Non Tender] : non-tender [Non-distended] : non-distended [No Masses] : no abdominal mass palpated [No HSM] : no HSM [Normal Bowel Sounds] : normal bowel sounds [Normal Posterior Cervical Nodes] : no posterior cervical lymphadenopathy [Normal Anterior Cervical Nodes] : no anterior cervical lymphadenopathy [No CVA Tenderness] : no CVA  tenderness [No Spinal Tenderness] : no spinal tenderness [Grossly Normal Strength/Tone] : grossly normal strength/tone [No Rash] : no rash [Coordination Grossly Intact] : coordination grossly intact [No Focal Deficits] : no focal deficits [Normal Gait] : normal gait [Deep Tendon Reflexes (DTR)] : deep tendon reflexes were 2+ and symmetric [Normal Affect] : the affect was normal [Normal Insight/Judgement] : insight and judgment were intact [de-identified] : ROM limited to pain on right wrist, Pain on palpation on posterior aspect of wrist. + finelstein test, negative grind test, negative tinels [No Acute Distress] : no acute distress [Well Nourished] : well nourished [Well Developed] : well developed [No Respiratory Distress] : no respiratory distress  [No Accessory Muscle Use] : no accessory muscle use [Clear to Auscultation] : lungs were clear to auscultation bilaterally [Normal Rate] : normal rate  [Regular Rhythm] : with a regular rhythm [Normal S1, S2] : normal S1 and S2 [Pedal Pulses Present] : the pedal pulses are present [No Joint Swelling] : no joint swelling

## 2021-08-12 NOTE — REVIEW OF SYSTEMS
[Fever] : no fever [Chills] : no chills [Night Sweats] : no night sweats [Muscle Pain] : no muscle pain [Back Pain] : no back pain [Joint Swelling] : no joint swelling

## 2021-08-12 NOTE — PLAN
[FreeTextEntry1] : \par # Arthralgia of the right wrist  likely 2/2 to de Querveins tenosynovitis\par -xray of right wrist(8/2) Negative for fracture or degeneration\par -CTS and ulnar neuropathy less likely as no numbness tingling along median nerve and ulnar nerve respectively\par -continue wrist brace\par -Ordered OT referral\par -Educated, demonstrated, and handed out occupational therapy exercises for de Quervain tenosynovitis to be done 3-5x a week. Advised to use warm compresses on area\par -Advised to  prescription for Naproxen 500mg BID PRN for 5-7 days prescribed\par -Consider Ortho Refferal for Corticosteroid injection next visit if pain persists\par \par #Morbid Obesity\par - Discussed the benefits of weight loss with pt, and risks associated with obesity. Pt is receptive to lifestyle modification techniques to lose weight - at least 30mins-1hr aerobic exercises/day x5 days per week advised\par - Decreased food portion sizes, increase in whole grains, assorted vegetables and fruits and decreased sugar beverages discussed and encouraged\par - Weight loss goal of 4-5lbs within the next 4-5 weeks encouraged. Pt is receptive to this goal.\par - Ordered Lipid panel, HbA1C, CMP labs obtained today. Will f/u with results\par \par #Elevated BP\par -140/90 in Clinic today. 2/2 to pain\par -RTC in 2 weeks for BP check\par -Consider starting ACE if still elevated\par \par #DM2\par -A1c 6.2 2/5/21\par -Ordered A1c and micro/albumin\par #Health\par -Ordered FIT testing\par \par RTC in 2 weeks for BP check and Right wrist pain\par \par \par

## 2021-08-12 NOTE — PHYSICAL EXAM
[de-identified] : Radial Pulses 2+ [de-identified] : Right Wrist: NTTP along snuff box. Decreased flexion and Extension of wrist 2/2 to pain. Positive Finklestein TEST. Negative phalens and Tinels

## 2021-08-12 NOTE — HISTORY OF PRESENT ILLNESS
[FreeTextEntry1] : F/U Wrist Pain [de-identified] : 51 y/o male w/ PMH of morbid obesity, MYA, Diabetes who presents to clinic for f/u right sided wrist pain. Patient reports pain started 1 month ago without inciting trauma. Pain is triggered with wrist extension. Pain is located along the wrist along abductor pollicis longus and  extensor pollicis brevis. Patient reports the wrist splint(Wears daily) and icing has helped reduce his pain and improved slightly his range of motion. Patient has not tried any pain relievers that were prescribed in his last clinic visit. Patient denies paresthesias, fevers, chills, CP, SOB.

## 2021-08-13 DIAGNOSIS — M25.531 PAIN IN RIGHT WRIST: ICD-10-CM

## 2021-08-16 DIAGNOSIS — I10 ESSENTIAL (PRIMARY) HYPERTENSION: ICD-10-CM

## 2021-08-17 ENCOUNTER — OUTPATIENT (OUTPATIENT)
Dept: OUTPATIENT SERVICES | Facility: HOSPITAL | Age: 51
LOS: 1 days | End: 2021-08-17

## 2021-08-17 DIAGNOSIS — E78.5 HYPERLIPIDEMIA, UNSPECIFIED: ICD-10-CM

## 2021-08-17 DIAGNOSIS — E11.9 TYPE 2 DIABETES MELLITUS WITHOUT COMPLICATIONS: ICD-10-CM

## 2021-08-17 DIAGNOSIS — Z12.11 ENCOUNTER FOR SCREENING FOR MALIGNANT NEOPLASM OF COLON: ICD-10-CM

## 2021-08-17 DIAGNOSIS — Z98.890 OTHER SPECIFIED POSTPROCEDURAL STATES: Chronic | ICD-10-CM

## 2021-10-30 ENCOUNTER — APPOINTMENT (OUTPATIENT)
Dept: FAMILY MEDICINE | Facility: HOSPITAL | Age: 51
End: 2021-10-30

## 2021-10-30 ENCOUNTER — OUTPATIENT (OUTPATIENT)
Dept: OUTPATIENT SERVICES | Facility: HOSPITAL | Age: 51
LOS: 1 days | End: 2021-10-30
Payer: SELF-PAY

## 2021-10-30 VITALS
DIASTOLIC BLOOD PRESSURE: 106 MMHG | HEART RATE: 95 BPM | TEMPERATURE: 96.8 F | RESPIRATION RATE: 18 BRPM | SYSTOLIC BLOOD PRESSURE: 141 MMHG | OXYGEN SATURATION: 98 %

## 2021-10-30 VITALS — BODY MASS INDEX: 57.15 KG/M2 | WEIGHT: 315 LBS

## 2021-10-30 DIAGNOSIS — Z09 ENCOUNTER FOR FOLLOW-UP EXAMINATION AFTER COMPLETED TREATMENT FOR CONDITIONS OTHER THAN MALIGNANT NEOPLASM: ICD-10-CM

## 2021-10-30 DIAGNOSIS — Z00.00 ENCOUNTER FOR GENERAL ADULT MEDICAL EXAMINATION WITHOUT ABNORMAL FINDINGS: ICD-10-CM

## 2021-10-30 DIAGNOSIS — Z98.890 OTHER SPECIFIED POSTPROCEDURAL STATES: Chronic | ICD-10-CM

## 2021-10-30 DIAGNOSIS — M25.561 PAIN IN RIGHT KNEE: ICD-10-CM

## 2021-10-30 PROCEDURE — 36415 COLL VENOUS BLD VENIPUNCTURE: CPT

## 2021-10-30 PROCEDURE — 83036 HEMOGLOBIN GLYCOSYLATED A1C: CPT

## 2021-10-30 PROCEDURE — G0463: CPT

## 2021-10-30 PROCEDURE — 84550 ASSAY OF BLOOD/URIC ACID: CPT

## 2021-10-30 RX ORDER — NAPROXEN 500 MG/1
500 TABLET ORAL
Qty: 14 | Refills: 1 | Status: DISCONTINUED | COMMUNITY
Start: 2021-08-02 | End: 2021-10-30

## 2021-10-30 NOTE — ASSESSMENT
[FreeTextEntry1] : 52 y/o M with PMHx of DM II, morbid obese, HLD, MYA, gout  came to the clinic for evaluation or R knee pain.\par \par #R knee pain \par - Started the last Thursday and is improving \par - Physical exam showed pain more prominent in the posterior knee with a palpable mass that could be secondary to a baker cyst vs  soft tissue mass like lipoma.\par - Knee pain can also have a OA component \par - Uncertain if this can also be a resolving gout flare due pt reports some swelling at the beginning of the symptoms\par - Pt insisting to have a prescription of Tylenol- codeine  but was reassured he don't need this medication at this time\par - I sent Indomethacin 50 mg to the the pharmacy and pt was instructed to take a pill TID x 3 days and then as needed \par - Check Uric acid levels \par - X ray of the knee ordered \par - PT referral given \par -Teresa loss importance discussed with patient \par \par #R occipital mass \par - Pt reports a painful  small mass in the occipital area that sometime fluctuate on size \par - Check US soft tissue to the neck \par \par #Tinnitus \par - More prominent in the left side \par - No signs of otitis \par - No dizziness, hearing loss reported \par - Audometry referral given \par \par # Arthralgia of the right wrist likely 2/2 to de Quervain tenosynovitis\par - X ray of right wrist performed on 8/2021 showed no fracture of subluxation\par - Pt completed PT \par - Pt reports resolution of symptoms \par \par #Morbid Obesity\par - BMI 58 \par - Loss approximately 6 pound since last visit \par - Pt reports is eating healthier\par - Discussed the benefits of weight loss with pt, and risks associated with obesity. Pt is receptive to lifestyle modification techniques to lose weight - at least 30 mins-1hr aerobic exercises/day x5 days per week advised\par - Decreased food portion sizes, increase in whole grains, assorted vegetables and fruits and decreased sugar beverages discussed and encouraged\par - Weight loss goal of 4-5lbs within the next 4-5 weeks encouraged. Pt is receptive to this goal.\par - Pt benefit of bariatric surgery, but don't have insurance at this moment. I gave a referral for bariatric surgery and pt will call to have more information about resources. \par \par # HTN \par - BP today 140/90, repeated 141/106\par -Multiple elevated BP at clinic in the past \par - Start Losartan 25 mg PO daily ( due pt have hx of gout)\par - Pt counseled to take blood pressure at home daily and to bring the results to the next appt \par - Pt counseled to follow up a diet low in salt and high in vegetables and fruits.\par - Pt counseled to exercise at least 150 min weekly \par \par #Dermatitis \par - Pt with multiple scaly plaques and scar in the arm and legs \par - Pt reports went to a dermatology when was a child and was told  had allergy to mosquito bites \par - Could be secondary to eczema, prurigo nodularis vs psoriasis variant\par - Start Triamcinolone 0.1 % two times daily x 2 weeks and then one week off \par - Pt will benefit of dermatology evaluation and possible skin biopsy \par \par #DM2\par -A1c 6.2 2/5/21\par - POCT- Aic \par -Microalbumin/cr ratio on 9/11/21- normal \par - Repeat A1c  \par - Continue with Metformin 500 mg PO BID\par - Pt counseled to exercise at least 5 times weekly for 30 minutes \par - Pt counseled to continue a diet low in carbs and high in vegetables and fruits \par \par #MYA \par - Pt reports is using the CPAP every night \par - Pt have not followed with pulmonary in years \par - Pt will need pulmonary referral for follow up \par \par #Preventive measures \par - FIT test on Aug, 2021- negative \par - Flu vaccine given today \par \par RTC in 2 weeks for BP check and f/u R knee pain \par \par Case discussed with Dr Fernandez \par

## 2021-10-30 NOTE — REVIEW OF SYSTEMS
[Negative] : Gastrointestinal [Earache] : no earache [Hearing Loss] : no hearing loss [Nosebleeds] : no nosebleeds [Postnasal Drip] : no postnasal drip [Nasal Discharge] : no nasal discharge [Sore Throat] : no sore throat [Hoarseness] : no hoarseness [Joint Stiffness] : no joint stiffness [Muscle Pain] : no muscle pain [Muscle Weakness] : no muscle weakness [Back Pain] : no back pain [Joint Swelling] : no joint swelling [FreeTextEntry4] : Left ear tinnitus, R occipital area palpable mass   [FreeTextEntry9] : R knee pain

## 2021-10-30 NOTE — PHYSICAL EXAM
[Normal] : soft, non-tender, non-distended, no masses palpated, no HSM and normal bowel sounds [No Joint Swelling] : no joint swelling [Grossly Normal Strength/Tone] : grossly normal strength/tone [Normal Outer Ear/Nose] : the outer ears and nose were normal in appearance [Normal Oropharynx] : the oropharynx was normal [Normal TMs] : both tympanic membranes were normal [Normal Nasal Mucosa] : the nasal mucosa was normal [de-identified] : R occipital area with a palpable area that is painful and hard but not discrete mass  [de-identified] : R knee with normal ROM. No swelling or erythema. Palpable mass in the posterior knee

## 2021-10-30 NOTE — HISTORY OF PRESENT ILLNESS
[FreeTextEntry1] : R knee pain  [de-identified] : 50 y/o M with PMHx of DM II, morbid obese, HLD, MYA came to the clinic for evaluation or R knee pain. Pt last time seen at clinic was on 8/11/21 for follow up of R wrist pain suspected to be secondary to De Quervain tenosynovitis and was prescribed naproxen and referred to PT.Pt reports had PT for about one month, 4-6 sessions with completely resolution of R wrist pain. Today patient complaints of  R knee pain that started the last Tuesday. Pt reports knee pain was initially in the anterior area but now is more prominent in the posterior area.He reports possible associated R knee swelling when R  knee pain started but is not completely sure. Wife reports noticed an ecchymosis in the R knee when symptoms started. He don't recall any trauma to the R knee. He took Tylenol- codeine in two occasions with some improvement of the pain. Pt states knee pain is better and currently rate it of an intensity 2/10. Pt reports a hx of gout with frequents flare in different joints and have refused treatment with allopurinol in the past. Pt also reports left side tinnitus since had COVID 19 on 2019. He denies associated ear pain, decreased hearing and dizziness. He also endorse noticed a small mass in the R occipital area that sometime get bigger and painful to palpation. Pt also denies fever, chills, abdominal pain, nausea, vomits and any other symptoms.

## 2021-10-31 DIAGNOSIS — H93.12 TINNITUS, LEFT EAR: ICD-10-CM

## 2021-10-31 DIAGNOSIS — E11.9 TYPE 2 DIABETES MELLITUS WITHOUT COMPLICATIONS: ICD-10-CM

## 2021-11-16 ENCOUNTER — RESULT REVIEW (OUTPATIENT)
Age: 51
End: 2021-11-16

## 2021-11-16 ENCOUNTER — OUTPATIENT (OUTPATIENT)
Dept: OUTPATIENT SERVICES | Facility: HOSPITAL | Age: 51
LOS: 1 days | End: 2021-11-16
Payer: SELF-PAY

## 2021-11-16 DIAGNOSIS — Z00.00 ENCOUNTER FOR GENERAL ADULT MEDICAL EXAMINATION WITHOUT ABNORMAL FINDINGS: ICD-10-CM

## 2021-11-16 DIAGNOSIS — Z98.890 OTHER SPECIFIED POSTPROCEDURAL STATES: Chronic | ICD-10-CM

## 2021-11-16 DIAGNOSIS — M25.561 PAIN IN RIGHT KNEE: ICD-10-CM

## 2021-11-16 PROCEDURE — 76536 US EXAM OF HEAD AND NECK: CPT

## 2021-11-16 PROCEDURE — 76536 US EXAM OF HEAD AND NECK: CPT | Mod: 26

## 2021-11-18 LAB
ESTIMATED AVERAGE GLUCOSE: 131 MG/DL
HBA1C MFR BLD HPLC: 6.2 %
URATE SERPL-MCNC: 8.4 MG/DL

## 2021-12-11 ENCOUNTER — OUTPATIENT (OUTPATIENT)
Dept: OUTPATIENT SERVICES | Facility: HOSPITAL | Age: 51
LOS: 1 days | End: 2021-12-11
Payer: SELF-PAY

## 2021-12-11 ENCOUNTER — APPOINTMENT (OUTPATIENT)
Dept: FAMILY MEDICINE | Facility: HOSPITAL | Age: 51
End: 2021-12-11

## 2021-12-11 VITALS
SYSTOLIC BLOOD PRESSURE: 122 MMHG | DIASTOLIC BLOOD PRESSURE: 86 MMHG | HEIGHT: 69 IN | BODY MASS INDEX: 46.65 KG/M2 | WEIGHT: 315 LBS | RESPIRATION RATE: 14 BRPM | HEART RATE: 82 BPM | TEMPERATURE: 97 F | OXYGEN SATURATION: 95 %

## 2021-12-11 DIAGNOSIS — Z98.890 OTHER SPECIFIED POSTPROCEDURAL STATES: Chronic | ICD-10-CM

## 2021-12-11 DIAGNOSIS — D17.0 BENIGN LIPOMATOUS NEOPLASM OF SKIN AND SUBCUTANEOUS TISSUE OF HEAD, FACE AND NECK: ICD-10-CM

## 2021-12-11 DIAGNOSIS — Z00.00 ENCOUNTER FOR GENERAL ADULT MEDICAL EXAMINATION WITHOUT ABNORMAL FINDINGS: ICD-10-CM

## 2021-12-11 DIAGNOSIS — H93.8X2 OTHER SPECIFIED DISORDERS OF LEFT EAR: ICD-10-CM

## 2021-12-11 DIAGNOSIS — M25.531 PAIN IN RIGHT WRIST: ICD-10-CM

## 2021-12-11 DIAGNOSIS — M65.4 RADIAL STYLOID TENOSYNOVITIS [DE QUERVAIN]: ICD-10-CM

## 2021-12-11 PROCEDURE — 86800 THYROGLOBULIN ANTIBODY: CPT

## 2021-12-11 PROCEDURE — G0463: CPT

## 2021-12-11 PROCEDURE — 84443 ASSAY THYROID STIM HORMONE: CPT

## 2021-12-11 PROCEDURE — 36415 COLL VENOUS BLD VENIPUNCTURE: CPT

## 2021-12-11 RX ORDER — TRIAMCINOLONE ACETONIDE 1 MG/G
0.1 OINTMENT TOPICAL
Qty: 1 | Refills: 0 | Status: DISCONTINUED | COMMUNITY
Start: 2021-10-30 | End: 2021-12-11

## 2021-12-12 PROBLEM — D17.0 LIPOMA OF SCALP: Status: ACTIVE | Noted: 2021-12-12

## 2021-12-12 PROBLEM — H93.8X2 MASS OF LEFT EAR CANAL: Status: RESOLVED | Noted: 2020-11-07 | Resolved: 2021-12-12

## 2021-12-12 PROBLEM — M25.531 ARTHRALGIA OF RIGHT WRIST: Status: RESOLVED | Noted: 2021-08-02 | Resolved: 2021-12-12

## 2021-12-12 PROBLEM — M65.4 DE QUERVAIN'S TENOSYNOVITIS, RIGHT: Status: RESOLVED | Noted: 2021-08-11 | Resolved: 2021-12-12

## 2021-12-12 LAB — TSH SERPL-ACNC: 3.14 UIU/ML

## 2021-12-13 DIAGNOSIS — E06.9 THYROIDITIS, UNSPECIFIED: ICD-10-CM

## 2021-12-13 DIAGNOSIS — E11.9 TYPE 2 DIABETES MELLITUS WITHOUT COMPLICATIONS: ICD-10-CM

## 2021-12-13 DIAGNOSIS — E66.01 MORBID (SEVERE) OBESITY DUE TO EXCESS CALORIES: ICD-10-CM

## 2021-12-13 DIAGNOSIS — M10.9 GOUT, UNSPECIFIED: ICD-10-CM

## 2021-12-19 NOTE — ASSESSMENT
[FreeTextEntry1] : 50 y/o M with PMHx of DM II, morbid obese, HLD, MYA, gout  came to the clinic for follow up of R knee pain and US results. \par \par #R knee pain \par - Resolved \par - Uric acid 8.4 \par - Could be secondary to a resolved gout flare??\par - Knee pain can also have a OA component \par - X ray of the knee ordered but patient didn't performed it \par - PT referral given but patient didn't  make an appointment due resolution of symptoms.  \par -Teresa loss importance discussed with patient \par \par #R occipital mass 2/2 to a lipoma \par - Pt reports a small mass in the occipital area that sometime fluctuate on size \par - US neck showed an small lipoma \par - Pt reports mass is not bothering him\par - I will monitor for now and advised patient to came back if mass is  increasing on size or pain for surgical evaluation \par \par #Suspected thyroiditis \par - US neck showed changes concerning for thyroiditis \par - Asymptomatic \par - Check TSH, Thyroid antibodies \par \par #Tinnitus \par - More prominent in the left side \par - No signs of otitis \par - No dizziness, hearing loss reported \par - Audiometry referral given in previous visit. Pt need to make an appointment \par \par #Morbid Obesity\par - BMI 58 \par - Gained approximately 3  pound since last visit \par - Pt reports was not eating healthy \par - Discussed the benefits of weight loss with pt, and risks associated with obesity. Pt is receptive to lifestyle modification techniques to lose weight - at least 30 mins-1hr aerobic exercises/day x5 days per week advised\par - Decreased food portion sizes, increase in whole grains, assorted vegetables and fruits and decreased sugar beverages discussed and encouraged\par - Weight loss goal of 4-5lbs within the next 4-5 weeks encouraged. Pt is receptive to this goal.\par - Pt benefit of bariatric surgery, but don't have insurance at this moment. I gave a referral for bariatric surgery but patient didn’t called due don't have insurance. Pt advised about resources for patients on sliding scale and encouraged to have and evaluation. \par \par #Elevated BP \par - BP today 122/86\par -Multiple elevated BP at clinic in the past. Per patient was on pain ??\par - Continue  Losartan 25 mg PO daily for now. Can consider to d/c in the future if BP stable \par -Pt is not taking the BP at home \par - Pt counseled to take blood pressure at home daily and to bring the results to the next appt \par - Pt counseled to follow up a diet low in salt and high in vegetables and fruits.\par - Pt counseled to exercise at least 150 min weekly \par \par #Skin lesions\par - Pt with multiple scaly plaques and scar in the arm and legs \par - Pt reports went to a dermatology when was a child and was told  had allergy to mosquito bites \par - Could be secondary to eczema, prurigo nodularis vs psoriasis variant\par - Start Triamcinolone 0.1 % two times daily x 2 weeks and then one week off \par - Pt will benefit of dermatology evaluation and possible skin biopsy \par - I will refer to our dermatology clinic \par \par #DM2\par -A1c 6.0   \par -Microalbumin/cr ratio on 9/11/21- normal \par - Continue with Metformin 500 mg PO BID\par - Microfilament test WNL \par - Opthalmology referral given \par - Pt counseled to exercise at least 5 times weekly for 30 minutes \par - Pt counseled to continue a diet low in carbs and high in vegetables and fruits \par \par #MYA \par - Pt reports is using the CPAP every night \par - Pt have not followed with pulmonary in years \par - Pulmonary referral given for follow up \par \par #Tinea pedis \par - Ketoconazole 2% topical BID\par \par #Preventive measures \par - FIT test on Aug, 2021- negative \par - Flu vaccine given in previous visit \par - Pneumo 23 vaccine given today \par \par RTC in  4 weeks for follow up of obesity \par \par Case discussed with Dr Cintron \par

## 2021-12-19 NOTE — HISTORY OF PRESENT ILLNESS
[FreeTextEntry1] : follow up US neck , R knee pain  [de-identified] : 50 y/o M with PMHx of DM II, Morbid obesity , HLD, MYA on CPAP, HTN, gout came to the clinic for follow up of R knee pain and medical conditions. Pt last time seen at clinic was on 10/30/21 for evaluation of R knee pain suspected to be secondary to resolving gout episode. Pt was prescribed indomethacin and reported completely resolution of symptoms.Uric acid level took at that time was 8.4. Pt still refusing treatment with allopurinol. Pt also started on Losartan 25 mg PO oral daily for management of HTN. Pt reports was using medication until two days ago when run out medication. He is no taking the BP at home. In the previous visit patient also reported a R occipital mass and US soft tissue ordered . US showed evidence of a lipoma measuring approximately 6mm and possible thyroiditis.He denies thyroid symptom like cold/hot intolerance, unintentional weigh change, tremors , etc. He reports gained three pounds but attribute that to poor diet. He was refereed to bariatric surgery but patient never did the appointment due is concern don't have insurance.He also endorse a hx of MYA on CPAP machine for the last 15 years. He has not followed with pulmonary on years. He denies fever, chills, nausea, vomits, SOB, chest pain and any others symptoms.

## 2021-12-19 NOTE — PHYSICAL EXAM
[Normal] : no joint swelling and grossly normal strength and tone [Comprehensive Foot Exam Normal] : Right and left foot were examined and both feet are normal. No ulcers in either foot. Toes are normal and with full ROM.  Normal tactile sensation with monofilament testing throughout both feet [de-identified] : multiple scaly plaques and hyperpigmented scar in the arms and legs  [de-identified] : Tinea pedis

## 2021-12-22 LAB
THYROGLOB AB SERPL-ACNC: <20 IU/ML
THYROPEROXIDASE AB SERPL IA-ACNC: <10 IU/ML

## 2022-01-07 ENCOUNTER — OUTPATIENT (OUTPATIENT)
Dept: OUTPATIENT SERVICES | Facility: HOSPITAL | Age: 52
LOS: 1 days | End: 2022-01-07
Payer: SELF-PAY

## 2022-01-07 ENCOUNTER — APPOINTMENT (OUTPATIENT)
Dept: FAMILY MEDICINE | Facility: HOSPITAL | Age: 52
End: 2022-01-07

## 2022-01-07 VITALS
HEART RATE: 88 BPM | TEMPERATURE: 96.9 F | OXYGEN SATURATION: 96 % | SYSTOLIC BLOOD PRESSURE: 132 MMHG | RESPIRATION RATE: 16 BRPM | WEIGHT: 315 LBS | DIASTOLIC BLOOD PRESSURE: 83 MMHG | BODY MASS INDEX: 57.74 KG/M2

## 2022-01-07 DIAGNOSIS — Z00.00 ENCOUNTER FOR GENERAL ADULT MEDICAL EXAMINATION WITHOUT ABNORMAL FINDINGS: ICD-10-CM

## 2022-01-07 DIAGNOSIS — Z98.890 OTHER SPECIFIED POSTPROCEDURAL STATES: Chronic | ICD-10-CM

## 2022-01-07 PROCEDURE — G0463: CPT

## 2022-01-07 RX ORDER — TRIAMCINOLONE ACETONIDE 1 MG/G
0.1 OINTMENT TOPICAL
Qty: 1 | Refills: 1 | Status: DISCONTINUED | COMMUNITY
Start: 2021-12-11 | End: 2022-01-07

## 2022-01-08 NOTE — HISTORY OF PRESENT ILLNESS
[FreeTextEntry1] : Skin Lesions  [de-identified] : 52 y/o M with PMHx of DM II, morbid obese, HLD, MYA, gout  who presents to clinic for follow up of his skin lesions which were recently assessed a few weeks prior. He was sent home on a trial of Topical Steroids. Today he reports a lack in compliance in the above as the prescriptions were too costly. He states that he is no longer experiencing pruritus or tenderness to the area and states that his sxs worsen in the summer time. He also endorses perssistentce of his multiple skin tags that he would like to have cosmetically removed. On further ROS he denies Chest Pain, SOB/AZUL, abdominal pain, N/V/D, Fevers/Chills, HA or blurred vision with remaining ROS unremarkable.\par

## 2022-01-08 NOTE — ASSESSMENT
[FreeTextEntry1] : 52 y/o M with PMHx of DM II, morbid obese, HLD, MYA, gout  who presents to clinic for follow up of his skin lesions that are likely 2/2 eczema, prurigo nodularis vs psoriasis variant\par \par \par #Hyperpigmented Skin lesions likely in the setting of eczema, prurigo nodularis vs psoriasis variant\par - Pt with multiple scaly plaques and scar in the arm and legs \par - Re-prescribed Triamcinolone 0.1 % two times daily x 2 weeks and then one week off \par - Pt will benefit of dermatology evaluation and possible skin biopsy \par - Will refer to back to dermatology clinic for follow up as well as\par \par #Skin Tag\par -Pt with multiple Skin tag evasions in his left lateral neck \par -Pt would like to schedule cryo-removal\par -RTC in 2 weeks for re-evaluation and removal of the above\par \par #Gout\par -Pt states he was recently prescribed a medicaition for his Gout flares \par -[place] the medication is not listed in his med recc\par -Pt is to go home and call the clinic with the name of the medicine \par -Will confirm with pharmacy once medicaition is identified  \par \par \par #Future \par -RTC in 2 weeks for re-evaluation and removal of Skin tags and follow up of skin lesions \par -Refill Gout meds once pt identifies the name and dose \par \par Case discussed with Dr. Bradshaw \par

## 2022-01-08 NOTE — PHYSICAL EXAM
[Normal] : no joint swelling and grossly normal strength and tone [Comprehensive Foot Exam Normal] : Right and left foot were examined and both feet are normal. No ulcers in either foot. Toes are normal and with full ROM.  Normal tactile sensation with monofilament testing throughout both feet [de-identified] : multiple scaly plaques and hyperpigmented scar in the arms and legs  [de-identified] : Tinea pedis

## 2022-01-13 DIAGNOSIS — L98.9 DISORDER OF THE SKIN AND SUBCUTANEOUS TISSUE, UNSPECIFIED: ICD-10-CM

## 2022-01-26 ENCOUNTER — APPOINTMENT (OUTPATIENT)
Dept: PULMONOLOGY | Facility: CLINIC | Age: 52
End: 2022-01-26
Payer: COMMERCIAL

## 2022-01-26 VITALS
HEIGHT: 69 IN | BODY MASS INDEX: 46.65 KG/M2 | RESPIRATION RATE: 16 BRPM | SYSTOLIC BLOOD PRESSURE: 140 MMHG | OXYGEN SATURATION: 95 % | HEART RATE: 85 BPM | WEIGHT: 315 LBS | DIASTOLIC BLOOD PRESSURE: 80 MMHG | TEMPERATURE: 97.7 F

## 2022-01-26 PROCEDURE — 99213 OFFICE O/P EST LOW 20 MIN: CPT | Mod: 25

## 2022-01-26 PROCEDURE — ZZZZZ: CPT

## 2022-01-26 PROCEDURE — 94010 BREATHING CAPACITY TEST: CPT

## 2022-01-26 PROCEDURE — 94726 PLETHYSMOGRAPHY LUNG VOLUMES: CPT

## 2022-01-26 PROCEDURE — 94729 DIFFUSING CAPACITY: CPT

## 2022-01-26 NOTE — PHYSICAL EXAM
[No Acute Distress] : no acute distress [Normal Rate/Rhythm] : normal rate/rhythm [No Resp Distress] : no resp distress [Clear to Auscultation Bilaterally] : clear to auscultation bilaterally [No Abnormalities] : no abnormalities [Benign] : benign [Normal Gait] : normal gait [No Clubbing] : no clubbing [No Edema] : no edema [Normal Color/ Pigmentation] : normal color/ pigmentation [No Focal Deficits] : no focal deficits [Oriented x3] : oriented x3 [Normal Affect] : normal affect [TextBox_2] : morbid obesity [TextBox_44] : Large neck

## 2022-01-26 NOTE — HISTORY OF PRESENT ILLNESS
[TextBox_4] : 52YO Male PMH DM II, Morbid obesity, HLD, MYA on CPAP, HTN, gout and hx COVID 4/2020 with residual dyspnea who presents for follow up.\par \par He was last seen 2/2021 and reported continued dyspnea. He was recommended for CT chest and weight loss.\par \par Since his last visit, he reports his breathing is improved overall though with exertion he still has some episodes where he needs to catch his breath.\par He has not been able to do anything meaningful to lose weight. His wife is pending bariatric surgery and he plans to get baratric surgery and get insurance after she get her surgery. \par \par He  states he has been using used CPAPs he has bought off [employer]. He has been setting it to his old recommended level of 18cm/H2O. His last sleep study was 2005. He is using a Resmed S9.  Review of his CPAP data card shows that he has had 0 apneic events and that he has been using it for >6hrs a night every night.\par \par His PFTs done today show Reduced FEV1 and FVC with a normal FEV1/FVC . TLC and DLCO are normal. \par His flow volume loop shows flow limitation. \par \par

## 2022-01-26 NOTE — REASON FOR VISIT
[Follow-Up] : a follow-up visit [Shortness of Breath] : shortness of breath [TextBox_44] : Hx COVID, MYA

## 2022-01-26 NOTE — ASSESSMENT
[FreeTextEntry1] : 52YO Male PMH DM II, Morbid obesity, HLD, MYA on CPAP, HTN, gout and hx COVID 4/2020 with residual dyspnea who presents for follow up.\par \par #Dyspnea \par #Hx of COVID 4/2020\par - Pt reports improvement from his initial episode of COVID and from last appointment\par - PFTs indicative of obesity related flow limitation with decreased FEV1, FVC and ERV. Flow volume loop also demonstrates flow augmentation limitation.\par - Pt was able to increase his O2 saturation by simply breathing in deeper in the office which is indicative of obesity hypoventilation\par - Will repeat CT chest to evaluate praenchymal disease\par \par #MYA\par - Pt is compliant with his Resmed S9\par - Data card reviewed shows 0 apneic events and >6hrs use per night\par \par - RTC after CT chest is done\par \par Case discussed with Dr. Mckinley

## 2022-01-26 NOTE — REVIEW OF SYSTEMS
[Diabetes] : diabetes [Obesity] : obesity [Fever] : no fever [Fatigue] : no fatigue [Chills] : no chills [Epistaxis] : no epistaxis [Cough] : no cough [Hemoptysis] : no hemoptysis [Sputum] : no sputum [GERD] : no gerd [Abdominal Pain] : no abdominal pain [Nausea] : no nausea [Constipation] : no constipation [Dysuria] : no urgency [Chronic Pain] : no chronic pain [Rash] : no rash [Headache] : no headache [TextBox_91] : ankle pain from gout

## 2022-01-31 ENCOUNTER — APPOINTMENT (OUTPATIENT)
Dept: CT IMAGING | Facility: CLINIC | Age: 52
End: 2022-01-31
Payer: COMMERCIAL

## 2022-01-31 ENCOUNTER — OUTPATIENT (OUTPATIENT)
Dept: OUTPATIENT SERVICES | Facility: HOSPITAL | Age: 52
LOS: 1 days | End: 2022-01-31
Payer: SELF-PAY

## 2022-01-31 DIAGNOSIS — Z86.16 PERSONAL HISTORY OF COVID-19: ICD-10-CM

## 2022-01-31 DIAGNOSIS — G47.33 OBSTRUCTIVE SLEEP APNEA (ADULT) (PEDIATRIC): ICD-10-CM

## 2022-01-31 DIAGNOSIS — Z98.890 OTHER SPECIFIED POSTPROCEDURAL STATES: Chronic | ICD-10-CM

## 2022-01-31 DIAGNOSIS — Z00.00 ENCOUNTER FOR GENERAL ADULT MEDICAL EXAMINATION WITHOUT ABNORMAL FINDINGS: ICD-10-CM

## 2022-01-31 PROCEDURE — 71250 CT THORAX DX C-: CPT

## 2022-01-31 PROCEDURE — 71250 CT THORAX DX C-: CPT | Mod: 26

## 2022-02-01 ENCOUNTER — NON-APPOINTMENT (OUTPATIENT)
Age: 52
End: 2022-02-01

## 2022-02-11 ENCOUNTER — APPOINTMENT (OUTPATIENT)
Dept: FAMILY MEDICINE | Facility: HOSPITAL | Age: 52
End: 2022-02-11

## 2022-03-04 LAB
ALBUMIN SERPL ELPH-MCNC: 4.3 G/DL
ALP BLD-CCNC: 108 U/L
ALT SERPL-CCNC: 29 U/L
ANION GAP SERPL CALC-SCNC: 14 MMOL/L
AST SERPL-CCNC: 22 U/L
BASOPHILS # BLD AUTO: 0.09 K/UL
BASOPHILS NFR BLD AUTO: 1.1 %
BILIRUB SERPL-MCNC: 0.5 MG/DL
BUN SERPL-MCNC: 13 MG/DL
CALCIUM SERPL-MCNC: 9.2 MG/DL
CHLORIDE SERPL-SCNC: 104 MMOL/L
CHOLEST SERPL-MCNC: 205 MG/DL
CO2 SERPL-SCNC: 24 MMOL/L
CREAT SERPL-MCNC: 0.77 MG/DL
CREAT SPEC-SCNC: 136 MG/DL
EOSINOPHIL # BLD AUTO: 0.42 K/UL
EOSINOPHIL NFR BLD AUTO: 5 %
GLUCOSE SERPL-MCNC: 107 MG/DL
HCT VFR BLD CALC: 46.7 %
HDLC SERPL-MCNC: 36 MG/DL
HEMOCCULT STL QL IA: NEGATIVE
HGB BLD-MCNC: 15.5 G/DL
IMM GRANULOCYTES NFR BLD AUTO: 0.5 %
LDLC SERPL CALC-MCNC: 143 MG/DL
LYMPHOCYTES # BLD AUTO: 1.58 K/UL
LYMPHOCYTES NFR BLD AUTO: 19 %
MAN DIFF?: NORMAL
MCHC RBC-ENTMCNC: 28.9 PG
MCHC RBC-ENTMCNC: 33.2 GM/DL
MCV RBC AUTO: 87 FL
MICROALBUMIN 24H UR DL<=1MG/L-MCNC: 1.2 MG/DL
MICROALBUMIN/CREAT 24H UR-RTO: NORMAL MG/G
MONOCYTES # BLD AUTO: 0.65 K/UL
MONOCYTES NFR BLD AUTO: 7.8 %
NEUTROPHILS # BLD AUTO: 5.54 K/UL
NEUTROPHILS NFR BLD AUTO: 66.6 %
NONHDLC SERPL-MCNC: 170 MG/DL
PLATELET # BLD AUTO: 397 K/UL
POTASSIUM SERPL-SCNC: 4.8 MMOL/L
PROT SERPL-MCNC: 6.8 G/DL
RBC # BLD: 5.37 M/UL
RBC # FLD: 13.8 %
SODIUM SERPL-SCNC: 141 MMOL/L
TRIGL SERPL-MCNC: 136 MG/DL
WBC # FLD AUTO: 8.32 K/UL

## 2022-05-18 ENCOUNTER — APPOINTMENT (OUTPATIENT)
Dept: FAMILY MEDICINE | Facility: HOSPITAL | Age: 52
End: 2022-05-18

## 2022-05-18 ENCOUNTER — OUTPATIENT (OUTPATIENT)
Dept: OUTPATIENT SERVICES | Facility: HOSPITAL | Age: 52
LOS: 1 days | End: 2022-05-18
Payer: SELF-PAY

## 2022-05-18 VITALS — WEIGHT: 315 LBS | BODY MASS INDEX: 57.89 KG/M2

## 2022-05-18 VITALS
RESPIRATION RATE: 18 BRPM | DIASTOLIC BLOOD PRESSURE: 83 MMHG | HEART RATE: 72 BPM | TEMPERATURE: 98.2 F | OXYGEN SATURATION: 95 % | SYSTOLIC BLOOD PRESSURE: 124 MMHG

## 2022-05-18 DIAGNOSIS — Z98.890 OTHER SPECIFIED POSTPROCEDURAL STATES: Chronic | ICD-10-CM

## 2022-05-18 DIAGNOSIS — Z00.00 ENCOUNTER FOR GENERAL ADULT MEDICAL EXAMINATION WITHOUT ABNORMAL FINDINGS: ICD-10-CM

## 2022-05-18 PROCEDURE — G0463: CPT

## 2022-05-19 NOTE — HISTORY OF PRESENT ILLNESS
[FreeTextEntry8] : Patient is a 52 y/o Obese male who presents with left up abdominal pain. Started 2 weeks ago after he had a severe coughing episode from choking on water. Pain is dull/slight burning, 4/10, improved gradually since that time with conservative rest.  Patient has not taken NSaid for the pain.  Patient reports taking metamucil in fear that the pain was 2/2 to constipation but it did not relieve the pain despite having multiple soft BM.

## 2022-05-19 NOTE — PLAN
[FreeTextEntry1] : \par \par \par #Abdominal wall Strain possibly oblique strain\par -Reassured patient and advised to do RICE therapy with warm compresses\par -Patient advised to take NSaid with food for pain \par -Patient agreeable to plan\par \par \par RTC in 2-4 weeks for CPE

## 2022-05-19 NOTE — REVIEW OF SYSTEMS
[Abdominal Pain] : abdominal pain [Constipation] : constipation [Muscle Pain] : muscle pain [Fever] : no fever [Chills] : no chills [Recent Change In Weight] : ~T no recent weight change [Chest Pain] : no chest pain [Palpitations] : no palpitations [Lower Ext Edema] : no lower extremity edema [Orthopena] : no orthopnea [Shortness Of Breath] : no shortness of breath [Wheezing] : no wheezing [Cough] : no cough [Dyspnea on Exertion] : not dyspnea on exertion [Nausea] : no nausea [Diarrhea] : no diarrhea [Vomiting] : no vomiting [Heartburn] : no heartburn [Melena] : no melena [Joint Pain] : no joint pain [Itching] : no itching [Skin Rash] : no skin rash

## 2022-05-19 NOTE — PHYSICAL EXAM
[No Acute Distress] : no acute distress [Well Nourished] : well nourished [Well Developed] : well developed [Well-Appearing] : well-appearing [No Respiratory Distress] : no respiratory distress  [No Accessory Muscle Use] : no accessory muscle use [Clear to Auscultation] : lungs were clear to auscultation bilaterally [Regular Rhythm] : with a regular rhythm [Normal S1, S2] : normal S1 and S2 [No Murmur] : no murmur heard [de-identified] : Morbidly obese [de-identified] : MIld TTP underneath left rib cage that radiates superficial along oblique distribution. No ecchymosis or swelling noted on exam

## 2022-05-31 DIAGNOSIS — S39.011A STRAIN OF MUSCLE, FASCIA AND TENDON OF ABDOMEN, INITIAL ENCOUNTER: ICD-10-CM

## 2022-06-14 ENCOUNTER — APPOINTMENT (OUTPATIENT)
Dept: PULMONOLOGY | Facility: CLINIC | Age: 52
End: 2022-06-14

## 2022-06-15 ENCOUNTER — APPOINTMENT (OUTPATIENT)
Dept: FAMILY MEDICINE | Facility: HOSPITAL | Age: 52
End: 2022-06-15

## 2022-06-15 ENCOUNTER — APPOINTMENT (OUTPATIENT)
Dept: PULMONOLOGY | Facility: CLINIC | Age: 52
End: 2022-06-15
Payer: COMMERCIAL

## 2022-06-15 VITALS
OXYGEN SATURATION: 97 % | TEMPERATURE: 97.7 F | DIASTOLIC BLOOD PRESSURE: 83 MMHG | RESPIRATION RATE: 18 BRPM | SYSTOLIC BLOOD PRESSURE: 138 MMHG | HEART RATE: 86 BPM

## 2022-06-15 PROCEDURE — 99214 OFFICE O/P EST MOD 30 MIN: CPT

## 2022-06-15 NOTE — END OF VISIT
[] : Fellow [Time Spent: ___ minutes] : I have spent [unfilled] minutes of time on the encounter. [FreeTextEntry3] : For patient’s upcoming bariatric surgery, the anesthesiologist should be made aware of his history of obstructive sleep apnea, and take appropriate perioperative sleep apnea-related precautions.  These include extubation only when fully awake and alert and in a non-supine position with close post-operative respiratory monitoring until effects of medications with respiratory depressant properties are resolved.  The patient should use CPAP perioperatively as clinically indicated.  With these precautions the patient should tolerate the proposed surgery from a sleep apnea perspective. He is optimized from a respiratory standpoint. He reports to be using CPAP pressure of 18.  He is not wirelessly linked because he self purchased his [employer] device.  He did not bring his SD data card, but on last office visit, his AHI was normal with good adherence to Pap therapy.  Continue CPAP therapy.  Advised patient that if surgery is not scheduled within the next 2 months, I recommend that he make a follow-up appointment for reassessment of respiratory preoperative clearance closer to his surgical date.\par Note will be faxed to his Bariatric surgeon at [facility]. \par \par

## 2022-06-15 NOTE — HISTORY OF PRESENT ILLNESS
[Never] : never [TextBox_4] : Madi Boudreaux is a 51 year old male w/ DM II, Morbid obesity, HLD, MYA on CPAP, HTN, gout and hx COVID 4/2020 with residual dyspnea who presents to pulmonary clinic 6/15/22 for follow up and pulmonary clearance prior to gastric sleeve surgery.  He was last seen in clinic 1/26/22 for follow up with plan at that time to obtain repeat CT chest and continue CPAP.\par \par On exam patient denies any cough or fever.  He states he is currently short of breath with exertion but that has been stable if not slightly improved over the last 6 months.  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  He estimates he can climb 2 flights of stairs before having to stop due to shortness of breath and that he can walk about 2 blocks before having to stop and rest.  He denies chest pain.  He endorses compliance with his CPAP.   \par

## 2022-06-15 NOTE — PHYSICAL EXAM
[No Acute Distress] : no acute distress [Normal Oropharynx] : normal oropharynx [Normal Appearance] : normal appearance [Normal Rate/Rhythm] : normal rate/rhythm [Normal S1, S2] : normal s1, s2 [No Murmurs] : no murmurs [No Resp Distress] : no resp distress [Clear to Auscultation Bilaterally] : clear to auscultation bilaterally [No Abnormalities] : no abnormalities [Benign] : benign [Normal Gait] : normal gait [No Clubbing] : no clubbing [No Cyanosis] : no cyanosis [No Edema] : no edema [FROM] : FROM [Normal Color/ Pigmentation] : normal color/ pigmentation [No Focal Deficits] : no focal deficits [Oriented x3] : oriented x3 [Normal Affect] : normal affect [Not Tender] : not tender [TextBox_44] : thick neck [TextBox_89] : obese

## 2022-06-15 NOTE — ASSESSMENT
[FreeTextEntry1] : 52YO Male PMH DM II, Morbid obesity, HLD, MYA on CPAP, HTN, gout and hx COVID 4/2020 with residual dyspnea who presents for follow up.\par \par #Dyspnea \par #Hx of COVID 4/2020\par - Pt reports improvement from his initial episode of COVID and from last appointment\par - PFTs indicative of obesity related flow limitation with decreased FEV1, FVC and ERV. Flow volume loop also demonstrates flow augmentation limitation.\par - repeat CT chest 1/31/21 without evidence of residual lung disease post COVID\par \par #MYA\par - Pt reports compliance with his Resmed S9\par - Data card has been previously reviewed and demonstrated  0 apneic events and >6hrs use per night\par \par Pre-operative evalulation:\par \par With regards to his pre-operative evaluation, he does not require any further pulmonary optimization.  He has normal oxygen saturations on room air.  He is not actively wheezing and reports some degree of activity tolerance as above, which appears to be overall improved compare with prior visits. He endorses nightly compliance with CPAP therapy and denies any other symptoms concerning for infection or active pulmonary disease. He is at increased risk of abdominal surgery given his body habitus and obstructive sleep apnea, but currently requires no further optimization with regards to his respiratory status.  He should be extubated to non-invasive ventilation post-procedure and may bring in his own device for this purpose.\par \par \par Patient seen and discussed w/Dr. Sepulveda\par \par Sohail Stahl PGY4\par [id number]

## 2022-07-13 ENCOUNTER — RESULT REVIEW (OUTPATIENT)
Age: 52
End: 2022-07-13

## 2022-08-17 ENCOUNTER — TRANSCRIPTION ENCOUNTER (OUTPATIENT)
Age: 52
End: 2022-08-17

## 2022-08-20 ENCOUNTER — RESULT CHARGE (OUTPATIENT)
Age: 52
End: 2022-08-20

## 2022-08-20 ENCOUNTER — OUTPATIENT (OUTPATIENT)
Dept: OUTPATIENT SERVICES | Facility: HOSPITAL | Age: 52
LOS: 1 days | End: 2022-08-20
Payer: SELF-PAY

## 2022-08-20 ENCOUNTER — APPOINTMENT (OUTPATIENT)
Dept: FAMILY MEDICINE | Facility: HOSPITAL | Age: 52
End: 2022-08-20

## 2022-08-20 VITALS
OXYGEN SATURATION: 98 % | RESPIRATION RATE: 18 BRPM | BODY MASS INDEX: 58.77 KG/M2 | SYSTOLIC BLOOD PRESSURE: 139 MMHG | DIASTOLIC BLOOD PRESSURE: 85 MMHG | WEIGHT: 315 LBS | TEMPERATURE: 98 F | HEART RATE: 62 BPM

## 2022-08-20 DIAGNOSIS — Z00.00 ENCOUNTER FOR GENERAL ADULT MEDICAL EXAMINATION WITHOUT ABNORMAL FINDINGS: ICD-10-CM

## 2022-08-20 DIAGNOSIS — R22.0 LOCALIZED SWELLING, MASS AND LUMP, HEAD: ICD-10-CM

## 2022-08-20 DIAGNOSIS — L91.8 OTHER HYPERTROPHIC DISORDERS OF THE SKIN: ICD-10-CM

## 2022-08-20 DIAGNOSIS — Z86.69 PERSONAL HISTORY OF OTHER DISEASES OF THE NERVOUS SYSTEM AND SENSE ORGANS: ICD-10-CM

## 2022-08-20 DIAGNOSIS — Z98.890 OTHER SPECIFIED POSTPROCEDURAL STATES: Chronic | ICD-10-CM

## 2022-08-20 DIAGNOSIS — Z87.39 PERSONAL HISTORY OF OTHER DISEASES OF THE MUSCULOSKELETAL SYSTEM AND CONNECTIVE TISSUE: ICD-10-CM

## 2022-08-20 PROCEDURE — G0463: CPT

## 2022-08-20 RX ORDER — TRIAMCINOLONE ACETONIDE 1 MG/G
0.1 CREAM TOPICAL TWICE DAILY
Qty: 1 | Refills: 1 | Status: COMPLETED | COMMUNITY
Start: 2022-01-07 | End: 2022-08-20

## 2022-08-20 RX ORDER — METFORMIN HYDROCHLORIDE 500 MG/1
500 TABLET, COATED ORAL DAILY
Qty: 30 | Refills: 2 | Status: COMPLETED | COMMUNITY
Start: 2021-02-06 | End: 2022-08-20

## 2022-08-20 RX ORDER — KETOCONAZOLE 20 MG/G
2 CREAM TOPICAL TWICE DAILY
Qty: 1 | Refills: 1 | Status: COMPLETED | COMMUNITY
Start: 2021-12-14 | End: 2022-08-20

## 2022-08-20 RX ORDER — LOSARTAN POTASSIUM 25 MG/1
25 TABLET, FILM COATED ORAL DAILY
Qty: 30 | Refills: 2 | Status: COMPLETED | COMMUNITY
Start: 2021-10-30 | End: 2022-08-20

## 2022-08-22 LAB
HBA1C MFR BLD HPLC: 6.2
SARS-COV-2 RNA CT RESP QN NAA+PROBE: NEGATIVE

## 2022-08-24 ENCOUNTER — APPOINTMENT (OUTPATIENT)
Dept: FAMILY MEDICINE | Facility: HOSPITAL | Age: 52
End: 2022-08-24

## 2022-08-24 ENCOUNTER — APPOINTMENT (OUTPATIENT)
Dept: PULMONOLOGY | Facility: CLINIC | Age: 52
End: 2022-08-24

## 2022-08-24 VITALS
HEART RATE: 68 BPM | BODY MASS INDEX: 46.65 KG/M2 | WEIGHT: 315 LBS | HEIGHT: 69 IN | OXYGEN SATURATION: 98 % | TEMPERATURE: 98.2 F

## 2022-08-24 VITALS
SYSTOLIC BLOOD PRESSURE: 126 MMHG | DIASTOLIC BLOOD PRESSURE: 82 MMHG | WEIGHT: 315 LBS | BODY MASS INDEX: 46.65 KG/M2 | HEIGHT: 69 IN | HEART RATE: 88 BPM | RESPIRATION RATE: 18 BRPM | TEMPERATURE: 98.6 F | OXYGEN SATURATION: 95 %

## 2022-08-24 VITALS — SYSTOLIC BLOOD PRESSURE: 122 MMHG | DIASTOLIC BLOOD PRESSURE: 79 MMHG | HEART RATE: 69 BPM

## 2022-08-24 PROCEDURE — 99213 OFFICE O/P EST LOW 20 MIN: CPT | Mod: 25

## 2022-08-24 PROCEDURE — ZZZZZ: CPT

## 2022-08-24 PROCEDURE — 94726 PLETHYSMOGRAPHY LUNG VOLUMES: CPT

## 2022-08-24 PROCEDURE — 94010 BREATHING CAPACITY TEST: CPT

## 2022-08-24 PROCEDURE — 94729 DIFFUSING CAPACITY: CPT

## 2022-08-24 NOTE — PHYSICAL EXAM
[No Acute Distress] : no acute distress [Normal Rate/Rhythm] : normal rate/rhythm [Normal S1, S2] : normal s1, s2 [No Murmurs] : no murmurs [No Resp Distress] : no resp distress [Clear to Auscultation Bilaterally] : clear to auscultation bilaterally [No Abnormalities] : no abnormalities [Benign] : benign [Normal Gait] : normal gait [No Clubbing] : no clubbing [No Cyanosis] : no cyanosis [No Focal Deficits] : no focal deficits [Oriented x3] : oriented x3 [Normal Affect] : normal affect [TextBox_44] : thick neck [TextBox_125] : Areas of scarring from reported prior insect bites which the patient scratched, nonerythematous, nontender, no increased warmth

## 2022-08-24 NOTE — ASSESSMENT
[FreeTextEntry1] : 52YO Male PMH DM II, Morbid obesity, HLD, MYA on CPAP, HTN, gout and hx COVID 4/2020 with residual dyspnea who presents for pre-operative assessment\par \par #Dyspnea \par #Hx of COVID 4/2020\par Pt reports ongoing improvement from his initial episode of COVID and from last appointment.  PFTs indicative of obesity related flow limitation. No prior smoking history and no e/o emphysema on CT imaging. CT chest 1/31/21 without evidence of residual lung disease post COVID.\par - no further preoperative testing indicated at this time\par \par #MYA\par - Pt reports compliance with his CPAP at pressure of 18\par \par #Pre-operative evaluation:\par Pt does not require any further pulmonary optimization. He has normal oxygen saturations on room air. He is not actively wheezing and reports overall improvement in degree of activity tolerance. He endorses nightly compliance with CPAP therapy and denies any other symptoms concerning for infection or active pulmonary disease. He is at increased risk of abdominal surgery given his body habitus and obstructive sleep apnea, but currently requires no further optimization with regards to his respiratory status. He should be extubated to non-invasive ventilation post-procedure and may bring in his own device for this purpose. May benefit from delaying extubation until fully awake/alert.

## 2022-08-24 NOTE — HISTORY OF PRESENT ILLNESS
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 He Continues to wear his cpap with pressure of 18 consistently at night. He otherwise denies f/c, cp, n/v/d, cough, new/worsening dyspnea, excessive daytime sleepiness. Had prior CT chest which did not reveal significant abnormality which was done in 1/31/22. 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  Has 3 cats and a dog at home.

## 2022-08-24 NOTE — REVIEW OF SYSTEMS
[Fever] : no fever [Chills] : no chills [Cough] : no cough [Chest Tightness] : no chest tightness [Chest Discomfort] : no chest discomfort [Orthopnea] : no orthopnea [Abdominal Pain] : no abdominal pain [Nausea] : no nausea [Vomiting] : no vomiting [Dysuria] : no urgency [Rash] : no rash [Headache] : no headache [Dizziness] : no dizziness

## 2022-09-09 NOTE — ASSESSMENT
[As per surgery] : as per surgery [1] : 1 , RCRI Class: II, Risk of Post-Op Cardiac Complications: 6.0%, 95% CI for Risk Estimate: 4.9% - 7.4% [Patient Optimized for Surgery] : Patient optimized for surgery [No Further Testing Recommended] : no further testing recommended [FreeTextEntry4] : Average risk (2.1%) for serious complication, above average risk for any complication (3.2%) per ACS NSQIP

## 2022-09-09 NOTE — PHYSICAL EXAM
[Normal Sclera/Conjunctiva] : normal sclera/conjunctiva [No JVD] : no jugular venous distention [No Carotid Bruits] : no carotid bruits [No Varicosities] : no varicosities [Pedal Pulses Present] : the pedal pulses are present [No Edema] : there was no peripheral edema [Non Tender] : non-tender [Obese] : obese [Coordination Grossly Intact] : coordination grossly intact [No Focal Deficits] : no focal deficits [Normal] : affect was normal and insight and judgment were intact [de-identified] : ulcerations b/L UE

## 2022-09-09 NOTE — END OF VISIT
[FreeTextEntry3] : Labs, cardiology consult note, and EKG reviewed. Patient optimized for procedure.

## 2022-09-09 NOTE — HISTORY OF PRESENT ILLNESS
[No Pertinent Cardiac History] : no history of aortic stenosis, atrial fibrillation, coronary artery disease, recent myocardial infarction, or implantable device/pacemaker [Sleep Apnea] : sleep apnea [No Adverse Anesthesia Reaction] : no adverse anesthesia reaction in self or family member [Diabetes] : diabetes [(Patient denies any chest pain, claudication, dyspnea on exertion, orthopnea, palpitations or syncope)] : Patient denies any chest pain, claudication, dyspnea on exertion, orthopnea, palpitations or syncope [Moderate (4-6 METs)] : Moderate (4-6 METs) [FreeTextEntry1] : gastric sleeve  [FreeTextEntry2] : 9/12/22 [FreeTextEntry3] : Dr. Lancaster [FreeTextEntry4] : Pt reports 3rd digit distal swelling. Is scheduled for PFT's this week with pulm. [FreeTextEntry7] : Cardiology clearance from [facility]

## 2022-09-12 ENCOUNTER — RESULT REVIEW (OUTPATIENT)
Age: 52
End: 2022-09-12

## 2022-10-01 ENCOUNTER — APPOINTMENT (OUTPATIENT)
Dept: FAMILY MEDICINE | Facility: HOSPITAL | Age: 52
End: 2022-10-01

## 2022-10-01 ENCOUNTER — OUTPATIENT (OUTPATIENT)
Dept: OUTPATIENT SERVICES | Facility: HOSPITAL | Age: 52
LOS: 1 days | End: 2022-10-01
Payer: SELF-PAY

## 2022-10-01 VITALS
RESPIRATION RATE: 14 BRPM | TEMPERATURE: 97.7 F | SYSTOLIC BLOOD PRESSURE: 128 MMHG | OXYGEN SATURATION: 97 % | DIASTOLIC BLOOD PRESSURE: 82 MMHG | HEIGHT: 69 IN | HEART RATE: 66 BPM | BODY MASS INDEX: 46.65 KG/M2 | WEIGHT: 315 LBS

## 2022-10-01 DIAGNOSIS — M77.52 OTHER ENTHESOPATHY OF LEFT FOOT AND ANKLE: ICD-10-CM

## 2022-10-01 DIAGNOSIS — Z00.00 ENCOUNTER FOR GENERAL ADULT MEDICAL EXAMINATION WITHOUT ABNORMAL FINDINGS: ICD-10-CM

## 2022-10-01 DIAGNOSIS — R25.2 CRAMP AND SPASM: ICD-10-CM

## 2022-10-01 DIAGNOSIS — Z98.890 OTHER SPECIFIED POSTPROCEDURAL STATES: Chronic | ICD-10-CM

## 2022-10-01 DIAGNOSIS — Z23 ENCOUNTER FOR IMMUNIZATION: ICD-10-CM

## 2022-10-01 PROCEDURE — 85652 RBC SED RATE AUTOMATED: CPT

## 2022-10-01 PROCEDURE — G0008: CPT

## 2022-10-01 PROCEDURE — 85025 COMPLETE CBC W/AUTO DIFF WBC: CPT

## 2022-10-01 PROCEDURE — 86140 C-REACTIVE PROTEIN: CPT

## 2022-10-01 PROCEDURE — 80053 COMPREHEN METABOLIC PANEL: CPT

## 2022-10-01 PROCEDURE — 36415 COLL VENOUS BLD VENIPUNCTURE: CPT

## 2022-10-01 PROCEDURE — G0463: CPT

## 2022-10-01 RX ORDER — DICLOFENAC SODIUM 1% 10 MG/G
1 GEL TOPICAL DAILY
Qty: 1 | Refills: 0 | Status: DISCONTINUED | COMMUNITY
Start: 2022-08-20 | End: 2022-10-01

## 2022-10-01 RX ORDER — INDOMETHACIN 50 MG/1
50 CAPSULE ORAL 3 TIMES DAILY
Qty: 18 | Refills: 0 | Status: DISCONTINUED | COMMUNITY
Start: 2021-10-30 | End: 2022-10-01

## 2022-10-06 NOTE — PHYSICAL EXAM
[No Acute Distress] : no acute distress [Normal] : affect was normal and insight and judgment were intact [de-identified] : Mild tenderness in the right 1st and 2nd proximal metatarsal area. No swelling or erythema noted

## 2022-10-06 NOTE — HISTORY OF PRESENT ILLNESS
[FreeTextEntry1] : Leg cramps  [de-identified] : 52 years old male  presents to the clinic 2/2 to bilateral leg cramps. According to the pt he had gastric sleeve surgery on 12th sep 2022 and he lost 35-36 pound of weight after surgery. Pt was above 400 pounds before surgery. Stated that he is experiencing leg cramps since few days, worsening with physical activity, and unable to sleep due to these cramps. Muscle cramps improving after eating banana. Pt is on high protein diet now. \par Pt also complaining of left foot  pain,  and  has history of gout and he frequently gets flare up pains in his ankles. Pt is not taking gout medication at this time and he is requesting for medication for the gout. Denied sob , chest pain fever, and chills\par Pt saw Gastroenterologist yesterday. GI specialist  requested bilateral lower ext duplex ultrasound  to rule out DVT and CMP to rule-out electrolyte abnormality.

## 2022-10-06 NOTE — END OF VISIT
[] : Resident [FreeTextEntry3] : Agree with assessment and plan as written and discussed with the resident.\par Patient seen with the resident. Agree with US for complete work up to rule out DVT and obtain electrolytes. Await further information. Needs DM follow up icluding eye exam to be scheduled

## 2022-10-12 LAB
ALBUMIN SERPL ELPH-MCNC: 4.3 G/DL
ALP BLD-CCNC: 99 U/L
ALT SERPL-CCNC: 46 U/L
ANION GAP SERPL CALC-SCNC: 12 MMOL/L
AST SERPL-CCNC: 26 U/L
BASOPHILS # BLD AUTO: 0.04 K/UL
BASOPHILS NFR BLD AUTO: 0.6 %
BILIRUB SERPL-MCNC: 0.4 MG/DL
BUN SERPL-MCNC: 12 MG/DL
CALCIUM SERPL-MCNC: 9.5 MG/DL
CHLORIDE SERPL-SCNC: 102 MMOL/L
CO2 SERPL-SCNC: 25 MMOL/L
CREAT SERPL-MCNC: 0.84 MG/DL
CRP SERPL-MCNC: 12 MG/L
EGFR: 106 ML/MIN/1.73M2
EOSINOPHIL # BLD AUTO: 0.23 K/UL
EOSINOPHIL NFR BLD AUTO: 3.2 %
ERYTHROCYTE [SEDIMENTATION RATE] IN BLOOD BY WESTERGREN METHOD: 31 MM/HR
GLUCOSE SERPL-MCNC: 92 MG/DL
HCT VFR BLD CALC: 47.5 %
HGB BLD-MCNC: 14.9 G/DL
IMM GRANULOCYTES NFR BLD AUTO: 0.3 %
LYMPHOCYTES # BLD AUTO: 1.91 K/UL
LYMPHOCYTES NFR BLD AUTO: 26.5 %
MAN DIFF?: NORMAL
MCHC RBC-ENTMCNC: 28.5 PG
MCHC RBC-ENTMCNC: 31.4 GM/DL
MCV RBC AUTO: 91 FL
MONOCYTES # BLD AUTO: 0.6 K/UL
MONOCYTES NFR BLD AUTO: 8.3 %
NEUTROPHILS # BLD AUTO: 4.41 K/UL
NEUTROPHILS NFR BLD AUTO: 61.1 %
PLATELET # BLD AUTO: 433 K/UL
POTASSIUM SERPL-SCNC: 4.7 MMOL/L
PROT SERPL-MCNC: 6.6 G/DL
RBC # BLD: 5.22 M/UL
RBC # FLD: 14.1 %
SODIUM SERPL-SCNC: 139 MMOL/L
WBC # FLD AUTO: 7.21 K/UL

## 2022-11-29 ENCOUNTER — APPOINTMENT (OUTPATIENT)
Dept: ORTHOPEDIC SURGERY | Facility: CLINIC | Age: 52
End: 2022-11-29

## 2022-11-29 VITALS — WEIGHT: 315 LBS | HEIGHT: 69 IN | BODY MASS INDEX: 46.65 KG/M2

## 2022-11-29 PROCEDURE — 73030 X-RAY EXAM OF SHOULDER: CPT | Mod: RT

## 2022-11-29 PROCEDURE — 20611 DRAIN/INJ JOINT/BURSA W/US: CPT

## 2022-11-29 PROCEDURE — 99204 OFFICE O/P NEW MOD 45 MIN: CPT | Mod: 25

## 2022-11-29 NOTE — PHYSICAL EXAM
[de-identified] : Neurologic: normal coordination, normal DTR UE/LE , normal sensation\par Skin: normal skin, no rash, no lesions\par Constitutional: well developed and well nourished.\par Cardiovascular: peripheral vascular exam is grossly normal.\par Abdomen: normal bowel sounds, non-tender, no HSM and no mass.\par \par Right Shoulder 3-view xray: Calcific bodies\par \par Right Shoulder: \par +Impingement test\par +Neer test\par +Durham test
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
## 2022-11-29 NOTE — DISCUSSION/SUMMARY
[de-identified] : Patient was instructed to begin/continue physical therapy. \par Ultrasound guided R shoulder corticosteroid injection administered\par Follow up 6 weeks\par \par Right Shoulder Subacromial steroid injection - ULTRASOUND GUIDED\par \par Patient Identification\par Name/: Verbal with patient and/or family\par  \par Procedure Verification:\par Procedure confirmed with patient or family/designee\par Consent for procedure: Verbal Consent Given\par Relevant documentation completed, reviewed, and signed\par Clinical indications for procedure confirmed\par \par Time-out with all members of procedure team immediately prior to procedure:\par Correct patient identified. Agreement on procedure. Correct side and site.\par \par SHOULDER SUBACROMIAL INJECTION - RIGHT\par After verbal consent and identification of the correct patient and correct site, the RIGHT posterolateral shoulder was prepped using alcohol swabs and betadine. This was allowed time to air dry. A mixture of Kenalog, Bupivacaine was injected into the right shoulder subacromial space using a sterile 22G needle after ethyl chloride spray for skin anesthesia. The patient tolerated the procedure well.  A sterile dressing was placed.  After-care instructions were provided and included instructions to ice the area and to call if redness \par -----------------------------------------------\par Home Exercise\par The patient is instructed on a home exercise program.\par \par SONIYA HERRING Acting as a Scribe for Dr. Winter\par I, Soniya Herring, attest that this documentation has been prepared under the direction and in the presence of Provider Napoleon Winter MD.\par \par Activity Modification\par The patient was advised to modify their activities.\par \par Dx / Natural History\par The patient was advised of the diagnosis.  The natural history of the pathology was explained in full to the patient in layman's terms.  Several different treatment options were discussed and explained in full to the patient including the risks and benefits of both surgical and non-surgical treatments.  All questions and concerns were answered.\par \par Pain Guide Activities\par The patient was advised to let pain guide the gradual advancement of activities.\par \par RICE\par I explained to the patient that rest, ice, compression, and elevation would benefit them.  They may return to activity after follow-up or when they no longer have any pain.

## 2023-01-05 NOTE — ED ADULT TRIAGE NOTE - NSWEIGHTCALCTOOLDRUG_GEN_A_CORE
Patient is scheduled for left shoulder pain with Dr. Ewelina Clayton. No imaging in epic.      Future Appointments   Date Time Provider [facility]   1/26/2023 11:30 AM Johanna Lesch, APRN [facility] [place]   2/7/2023  1:40 PM Karine Cooper MD [id number]   3/17/2023  9:00 AM Reta Mcghee MD [id number]
Reviewed patients chart, xray orders are required. Order placed for Lt shoulder xrays.  Please contact patient advise to arrive 30 mins prior to patients appt to complete x-ray order and schedule patients xray appt-Thank you
 used

## 2023-01-10 ENCOUNTER — APPOINTMENT (OUTPATIENT)
Dept: ORTHOPEDIC SURGERY | Facility: CLINIC | Age: 53
End: 2023-01-10

## 2023-03-07 ENCOUNTER — APPOINTMENT (OUTPATIENT)
Dept: FAMILY MEDICINE | Facility: HOSPITAL | Age: 53
End: 2023-03-07

## 2023-03-07 ENCOUNTER — OUTPATIENT (OUTPATIENT)
Dept: OUTPATIENT SERVICES | Facility: HOSPITAL | Age: 53
LOS: 1 days | End: 2023-03-07
Payer: COMMERCIAL

## 2023-03-07 VITALS
OXYGEN SATURATION: 97 % | DIASTOLIC BLOOD PRESSURE: 88 MMHG | TEMPERATURE: 98.6 F | SYSTOLIC BLOOD PRESSURE: 139 MMHG | RESPIRATION RATE: 14 BRPM | HEIGHT: 69 IN | HEART RATE: 74 BPM | WEIGHT: 315 LBS | BODY MASS INDEX: 46.65 KG/M2

## 2023-03-07 DIAGNOSIS — Z12.11 ENCOUNTER FOR SCREENING FOR MALIGNANT NEOPLASM OF COLON: ICD-10-CM

## 2023-03-07 DIAGNOSIS — Z00.00 ENCOUNTER FOR GENERAL ADULT MEDICAL EXAMINATION WITHOUT ABNORMAL FINDINGS: ICD-10-CM

## 2023-03-07 DIAGNOSIS — Z98.890 OTHER SPECIFIED POSTPROCEDURAL STATES: Chronic | ICD-10-CM

## 2023-03-07 DIAGNOSIS — M54.30 SCIATICA, UNSPECIFIED SIDE: ICD-10-CM

## 2023-03-07 PROCEDURE — 83036 HEMOGLOBIN GLYCOSYLATED A1C: CPT

## 2023-03-07 PROCEDURE — 80061 LIPID PANEL: CPT

## 2023-03-07 PROCEDURE — 84153 ASSAY OF PSA TOTAL: CPT

## 2023-03-07 PROCEDURE — G0463: CPT

## 2023-03-07 PROCEDURE — 82274 ASSAY TEST FOR BLOOD FECAL: CPT

## 2023-03-07 PROCEDURE — 85025 COMPLETE CBC W/AUTO DIFF WBC: CPT

## 2023-03-08 PROBLEM — Z12.11 COLON CANCER SCREENING: Status: ACTIVE | Noted: 2021-08-11

## 2023-03-08 PROBLEM — M54.30 SCIATICA: Status: ACTIVE | Noted: 2023-03-07

## 2023-03-11 ENCOUNTER — OUTPATIENT (OUTPATIENT)
Dept: OUTPATIENT SERVICES | Facility: HOSPITAL | Age: 53
LOS: 1 days | End: 2023-03-11
Payer: COMMERCIAL

## 2023-03-11 ENCOUNTER — RESULT REVIEW (OUTPATIENT)
Age: 53
End: 2023-03-11

## 2023-03-11 DIAGNOSIS — D75.839 THROMBOCYTOSIS, UNSPECIFIED: ICD-10-CM

## 2023-03-11 DIAGNOSIS — E11.9 TYPE 2 DIABETES MELLITUS WITHOUT COMPLICATIONS: ICD-10-CM

## 2023-03-11 DIAGNOSIS — Z12.11 ENCOUNTER FOR SCREENING FOR MALIGNANT NEOPLASM OF COLON: ICD-10-CM

## 2023-03-11 DIAGNOSIS — Z98.890 OTHER SPECIFIED POSTPROCEDURAL STATES: Chronic | ICD-10-CM

## 2023-03-11 DIAGNOSIS — Z12.5 ENCOUNTER FOR SCREENING FOR MALIGNANT NEOPLASM OF PROSTATE: ICD-10-CM

## 2023-03-11 DIAGNOSIS — M54.30 SCIATICA, UNSPECIFIED SIDE: ICD-10-CM

## 2023-03-11 PROCEDURE — 72110 X-RAY EXAM L-2 SPINE 4/>VWS: CPT | Mod: 26

## 2023-03-12 PROCEDURE — 72110 X-RAY EXAM L-2 SPINE 4/>VWS: CPT

## 2023-03-15 ENCOUNTER — NON-APPOINTMENT (OUTPATIENT)
Age: 53
End: 2023-03-15

## 2023-03-15 DIAGNOSIS — N35.919 UNSPECIFIED URETHRAL STRICTURE, MALE, UNSPECIFIED SITE: ICD-10-CM

## 2023-03-15 LAB
BASOPHILS # BLD AUTO: 0.07 K/UL
BASOPHILS NFR BLD AUTO: 0.8 %
CHOLEST SERPL-MCNC: 175 MG/DL
EOSINOPHIL # BLD AUTO: 0.27 K/UL
EOSINOPHIL NFR BLD AUTO: 3.2 %
ESTIMATED AVERAGE GLUCOSE: 120 MG/DL
HBA1C MFR BLD HPLC: 5.8 %
HCT VFR BLD CALC: 47.7 %
HDLC SERPL-MCNC: 38 MG/DL
HGB BLD-MCNC: 15.9 G/DL
IMM GRANULOCYTES NFR BLD AUTO: 0.4 %
LDLC SERPL CALC-MCNC: 108 MG/DL
LYMPHOCYTES # BLD AUTO: 2.24 K/UL
LYMPHOCYTES NFR BLD AUTO: 26.3 %
MAN DIFF?: NORMAL
MCHC RBC-ENTMCNC: 29 PG
MCHC RBC-ENTMCNC: 33.3 GM/DL
MCV RBC AUTO: 86.9 FL
MONOCYTES # BLD AUTO: 0.59 K/UL
MONOCYTES NFR BLD AUTO: 6.9 %
NEUTROPHILS # BLD AUTO: 5.33 K/UL
NEUTROPHILS NFR BLD AUTO: 62.4 %
NONHDLC SERPL-MCNC: 138 MG/DL
PLATELET # BLD AUTO: 385 K/UL
PSA SERPL-MCNC: 0.68 NG/ML
RBC # BLD: 5.49 M/UL
RBC # FLD: 13.9 %
TRIGL SERPL-MCNC: 149 MG/DL
WBC # FLD AUTO: 8.53 K/UL

## 2023-03-19 ENCOUNTER — NON-APPOINTMENT (OUTPATIENT)
Age: 53
End: 2023-03-19

## 2023-03-19 LAB
APPEARANCE: CLEAR
BACTERIA: NEGATIVE
BILIRUBIN URINE: NEGATIVE
BLOOD URINE: NEGATIVE
COLOR: YELLOW
GLUCOSE QUALITATIVE U: NEGATIVE
HYALINE CASTS: 2 /LPF
KETONES URINE: NEGATIVE
LEUKOCYTE ESTERASE URINE: ABNORMAL
MICROSCOPIC-UA: NORMAL
NITRITE URINE: NEGATIVE
PH URINE: 7
PROTEIN URINE: NORMAL
RED BLOOD CELLS URINE: 3 /HPF
SPECIFIC GRAVITY URINE: 1.02
SQUAMOUS EPITHELIAL CELLS: 3 /HPF
UROBILINOGEN URINE: ABNORMAL
WHITE BLOOD CELLS URINE: 19 /HPF

## 2023-03-20 ENCOUNTER — NON-APPOINTMENT (OUTPATIENT)
Age: 53
End: 2023-03-20

## 2023-03-20 LAB — BACTERIA UR CULT: NORMAL

## 2023-03-27 NOTE — PHYSICAL EXAM
[No Acute Distress] : no acute distress [No Respiratory Distress] : no respiratory distress  [No Accessory Muscle Use] : no accessory muscle use [Normal Rate] : normal rate  [Regular Rhythm] : with a regular rhythm [Normal Gait] : normal gait [Normal Affect] : the affect was normal [de-identified] : General lumbar and paraspinal tenderness on palpation  Adequate: hears normal conversation without difficulty

## 2023-03-27 NOTE — HISTORY OF PRESENT ILLNESS
[FreeTextEntry1] : PSA test- evaluation for prostate cancer screening and colon cancer screening [de-identified] : 52M h/o gastric sleeve surgery 9/12/2022, gout, MYA on CPAP, HTN, here for PSA and FIT testing. Patient requesting PSA due to his grandfather passing away of prostate cancer. No other cancers in his family. He has a history of urethral stricture diagnosed several years ago by urology during a cystoscopy, but has not followed up. He has occasional mild burning on urination for many years but it is not persistent, not associated with fevers, chills, or flank pain. Patient denies constipation, nausea, vomiting, diarhea.

## 2023-03-31 ENCOUNTER — APPOINTMENT (OUTPATIENT)
Dept: ORTHOPEDIC SURGERY | Facility: CLINIC | Age: 53
End: 2023-03-31
Payer: COMMERCIAL

## 2023-03-31 VITALS — HEIGHT: 69 IN | WEIGHT: 315 LBS | BODY MASS INDEX: 46.65 KG/M2

## 2023-03-31 PROCEDURE — 20611 DRAIN/INJ JOINT/BURSA W/US: CPT | Mod: LT

## 2023-03-31 PROCEDURE — 73030 X-RAY EXAM OF SHOULDER: CPT | Mod: LT

## 2023-03-31 PROCEDURE — 99214 OFFICE O/P EST MOD 30 MIN: CPT | Mod: 25
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
## 2023-03-31 NOTE — PHYSICAL EXAM
[de-identified] : Neurologic: normal coordination, normal DTR UE/LE , normal sensation\par Skin: normal skin, no rash, no lesions\par Constitutional: well developed and well nourished.\par Cardiovascular: peripheral vascular exam is grossly normal.\par Abdomen: normal bowel sounds, non-tender, no HSM and no mass.\par \par Right Shoulder 3-view xray: Calcific bodies\par \par Right Shoulder: \par +Impingement test\par +Neer test\par +Durham test\par +Baylee sign\par 4-/5 Supraspinatus Strength \par \par left shoulder:\par +Impingement test\par +Neer test

## 2023-03-31 NOTE — DISCUSSION/SUMMARY
[de-identified] : Ultrasound guided R shoulder corticosteroid injection administered\par MRI of right shoulder to eval rotator cuff\par Follow up with MRI scans\par \par RB&A to corticosteroid injection discussed.\par All questions were answered.\par Patient wishes to move forward with injection today. \par \par Left Shoulder Subacromial steroid injection procedure note ULTRASOUND GUIDED:\par Patient Identification\par Name/: Verbal with patient and/or family\par  \par Procedure Verification:\par Procedure confirmed with patient or family/designee\par Consent for procedure: Verbal Consent Given\par Relevant documentation completed, reviewed, and signed\par Clinical indications for procedure confirmed\par \par Time-out with all members of procedure team immediately prior to procedure:\par Correct patient identified. Agreement on procedure. Correct side and site.\par \par SHOULDER SUBACROMIAL INJECTION - LEFT\par After verbal consent and identification of the correct patient and correct site, the LEFT posterolateral shoulder was prepped using alcohol swabs and betadine. This was allowed time to air dry. A mixture of Kenalog, Bupivacaine was injected into the left shoulder subacromial space using a sterile 22G needle after ethyl chloride spray for skin anesthesia. The patient tolerated the procedure well.  A sterile dressing was placed.  After-care instructions were provided and included instructions to ice the area and to call if redness \par \par Right Shoulder Subacromial steroid injection - ULTRASOUND GUIDED\par \par Patient Identification\par Name/: Verbal with patient and/or family\par  \par Procedure Verification:\par Procedure confirmed with patient or family/designee\par Consent for procedure: Verbal Consent Given\par Relevant documentation completed, reviewed, and signed\par Clinical indications for procedure confirmed\par \par Time-out with all members of procedure team immediately prior to procedure:\par Correct patient identified. Agreement on procedure. Correct side and site.\par \par SHOULDER SUBACROMIAL INJECTION - RIGHT\par After verbal consent and identification of the correct patient and correct site, the RIGHT posterolateral shoulder was prepped using alcohol swabs and betadine. This was allowed time to air dry. A mixture of Kenalog, Bupivacaine was injected into the right shoulder subacromial space using a sterile 22G needle after ethyl chloride spray for skin anesthesia. The patient tolerated the procedure well.  A sterile dressing was placed.  After-care instructions were provided and included instructions to ice the area and to call if redness \par \par -----------------------------------------------\par Home Exercise\par The patient is instructed on a home exercise program.\par \par CARLOS HARMAN Acting as a Scribe for Dr. Winter\par I, Carlos Harman, attest that this documentation has been prepared under the direction and in the presence of Provider Napoleon Winter MD.\par \par Activity Modification\par The patient was advised to modify their activities.\par \par Dx / Natural History\par The patient was advised of the diagnosis.  The natural history of the pathology was explained in full to the patient in layman's terms.  Several different treatment options were discussed and explained in full to the patient including the risks and benefits of both surgical and non-surgical treatments.  All questions and concerns were answered.\par \par Pain Guide Activities\par The patient was advised to let pain guide the gradual advancement of activities.\par \par RICE\par I explained to the patient that rest, ice, compression, and elevation would benefit them.  They may return to activity after follow-up or when they no longer have any pain.\par \par The patient's current medication management of their orthopedic diagnosis was reviewed today:\par (1) We discussed a comprehensive treatment plan that included possible pharmaceutical management involving the use of prescription strength medications including but not limited to options such as oral Naprosyn 500mg BID, once daily Meloxicam 15 mg, or 500-650 mg Tylenol versus over the counter oral medications and topical prescription NSAID Pennsaid vs over the counter Voltaren gel.\par (2) There is a moderate risk of morbidity with further treatment, especially from use of prescription strength medications and possible side effects of these medications which include upset stomach with oral medications, skin reactions to topical medications and cardiac/renal issues with long term use.\par (3) I recommended that the patient follow-up with their medical physician to discuss any significant specific potential issues with long term medication use such as interactions with current medications or with exacerbation of underlying medical comorbidities.\par (4) The benefits and risks associated with use of injectable, oral or topical, prescription and over the counter anti-inflammatory medications were discussed with the patient. The patient voiced understanding of the risks including but not limited to bleeding, stroke, kidney dysfunction, heart disease, and were referred to the black box warning label for further information.

## 2023-04-11 ENCOUNTER — FORM ENCOUNTER (OUTPATIENT)
Age: 53
End: 2023-04-11

## 2023-04-12 ENCOUNTER — APPOINTMENT (OUTPATIENT)
Dept: MRI IMAGING | Facility: CLINIC | Age: 53
End: 2023-04-12
Payer: COMMERCIAL

## 2023-04-12 PROCEDURE — 73221 MRI JOINT UPR EXTREM W/O DYE: CPT | Mod: RT

## 2023-04-13 LAB — HEMOCCULT STL QL IA: NEGATIVE

## 2023-04-18 ENCOUNTER — APPOINTMENT (OUTPATIENT)
Dept: ORTHOPEDIC SURGERY | Facility: CLINIC | Age: 53
End: 2023-04-18
Payer: COMMERCIAL

## 2023-04-18 VITALS — WEIGHT: 315 LBS | HEIGHT: 69 IN | BODY MASS INDEX: 46.65 KG/M2

## 2023-04-18 PROCEDURE — 99214 OFFICE O/P EST MOD 30 MIN: CPT
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
## 2023-04-18 NOTE — DATA REVIEWED
[FreeTextEntry1] : 03/31/23\par OC X Ray Left shoulder:4 views:\par Unremarkable\par \par 11/29/22\par OC X Ray Right Shoulder: 4 views:\par Unremarkable\par \par 04/12/23\par OC MRI right shoulder\par Impression:\par 1. Moderate distal supraspinatus and infraspinatus tendinopathy, with calcific tendinitis suspected, and \par moderate surrounding bursitis without tendon discontinuity.\par 2. Moderate-to-severe glenohumeral joint arthrosis with extensive labral tearing, osteophytes, effusion, \par synovitis, and subchondral cysts and capsular thickening.\par 3. Subscapularis tendinopathy, biceps tenosynovitis, AC joint arthrosis, and lateral acromial bone spurs.\par 4. Disproportionate teres minor muscle atrophy.\par 5. No findings to suggest acute fracture.\par 6. Patient motion degrades image quality on multiple imaging sequences.

## 2023-04-18 NOTE — DISCUSSION/SUMMARY
[de-identified] : referred to Dr. Haji for possible Hyaluronic Acid Injections versus surgery\par \par -----------------------------------------------\par Home Exercise\par The patient is instructed on a home exercise program.\par \par CARLOS HARMAN Acting as a Scribe for Dr. Winter\par I, Carlos Harman, attest that this documentation has been prepared under the direction and in the presence of Provider Napoleon Winter MD.\par \par Activity Modification\par The patient was advised to modify their activities.\par \par Dx / Natural History\par The patient was advised of the diagnosis.  The natural history of the pathology was explained in full to the patient in layman's terms.  Several different treatment options were discussed and explained in full to the patient including the risks and benefits of both surgical and non-surgical treatments.  All questions and concerns were answered.\par \par Pain Guide Activities\par The patient was advised to let pain guide the gradual advancement of activities.\par \par RICE\par I explained to the patient that rest, ice, compression, and elevation would benefit them.  They may return to activity after follow-up or when they no longer have any pain.\par \par The patient's current medication management of their orthopedic diagnosis was reviewed today:\par (1) We discussed a comprehensive treatment plan that included possible pharmaceutical management involving the use of prescription strength medications including but not limited to options such as oral Naprosyn 500mg BID, once daily Meloxicam 15 mg, or 500-650 mg Tylenol versus over the counter oral medications and topical prescription NSAID Pennsaid vs over the counter Voltaren gel.\par (2) There is a moderate risk of morbidity with further treatment, especially from use of prescription strength medications and possible side effects of these medications which include upset stomach with oral medications, skin reactions to topical medications and cardiac/renal issues with long term use.\par (3) I recommended that the patient follow-up with their medical physician to discuss any significant specific potential issues with long term medication use such as interactions with current medications or with exacerbation of underlying medical comorbidities.\par (4) The benefits and risks associated with use of injectable, oral or topical, prescription and over the counter anti-inflammatory medications were discussed with the patient. The patient voiced understanding of the risks including but not limited to bleeding, stroke, kidney dysfunction, heart disease, and were referred to the black box warning label for further information.

## 2023-04-18 NOTE — PHYSICAL EXAM
[de-identified] : Neurologic: normal coordination, normal DTR UE/LE , normal sensation\par Skin: normal skin, no rash, no lesions\par Constitutional: well developed and well nourished.\par Cardiovascular: peripheral vascular exam is grossly normal.\par Abdomen: normal bowel sounds, non-tender, no HSM and no mass.\par \par Right Shoulder 3-view xray: Calcific bodies\par \par Right Shoulder: \par +Impingement test\par +Neer test\par +Durham test\par +Baylee sign\par 4-/5 Supraspinatus Strength \par \par left shoulder:\par +Impingement test\par +Neer test

## 2023-05-01 ENCOUNTER — APPOINTMENT (OUTPATIENT)
Dept: ORTHOPEDIC SURGERY | Facility: CLINIC | Age: 53
End: 2023-05-01
Payer: COMMERCIAL

## 2023-05-01 VITALS — HEIGHT: 69 IN | BODY MASS INDEX: 46.65 KG/M2 | WEIGHT: 315 LBS

## 2023-05-01 DIAGNOSIS — Z01.812 ENCOUNTER FOR PREPROCEDURAL LABORATORY EXAMINATION: ICD-10-CM

## 2023-05-01 DIAGNOSIS — S49.91XD UNSPECIFIED INJURY OF RIGHT SHOULDER AND UPPER ARM, SUBSEQUENT ENCOUNTER: ICD-10-CM

## 2023-05-01 DIAGNOSIS — M25.561 PAIN IN RIGHT KNEE: ICD-10-CM

## 2023-05-01 DIAGNOSIS — M25.511 PAIN IN RIGHT SHOULDER: ICD-10-CM

## 2023-05-01 DIAGNOSIS — Z86.16 PERSONAL HISTORY OF COVID-19: ICD-10-CM

## 2023-05-01 DIAGNOSIS — L98.9 DISORDER OF THE SKIN AND SUBCUTANEOUS TISSUE, UNSPECIFIED: ICD-10-CM

## 2023-05-01 DIAGNOSIS — R39.89 OTHER SYMPTOMS AND SIGNS INVOLVING THE GENITOURINARY SYSTEM: ICD-10-CM

## 2023-05-01 DIAGNOSIS — Z20.822 ENCOUNTER FOR PREPROCEDURAL LABORATORY EXAMINATION: ICD-10-CM

## 2023-05-01 DIAGNOSIS — Z87.2 PERSONAL HISTORY OF DISEASES OF THE SKIN AND SUBCUTANEOUS TISSUE: ICD-10-CM

## 2023-05-01 DIAGNOSIS — Z86.39 PERSONAL HISTORY OF OTHER ENDOCRINE, NUTRITIONAL AND METABOLIC DISEASE: ICD-10-CM

## 2023-05-01 DIAGNOSIS — R25.2 CRAMP AND SPASM: ICD-10-CM

## 2023-05-01 DIAGNOSIS — M19.011 PRIMARY OSTEOARTHRITIS, RIGHT SHOULDER: ICD-10-CM

## 2023-05-01 PROCEDURE — 99214 OFFICE O/P EST MOD 30 MIN: CPT
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
## 2023-05-01 NOTE — ASSESSMENT
[FreeTextEntry1] : 51 yo M here for b/l shoulder pain. Has seen Dr. Winter and had b/l subacromial injections with excellent relief. Has minimal pain in shoulders today. Left shoulder slightly more bothersome after overdoing it with house work this past weekend but improved with naproxen. Repeat CSI/GH injections discussed as well as possible viscosupplementation if needed. Also discussed left shoulder MRI if symptoms unresponsive to cons mgmt. HEP given, unable to do formal PT at this time. Naproxen prn. RTO if symptoms worsen.\par \par \par Entered by JONO Mix acting as a scribe.\par Dr. Haji - The documentation recorded by the scribe accurately reflects the service I personally performed and the decisions made by me.\par

## 2023-05-01 NOTE — IMAGING
[de-identified] : b/l shoulders\par No swelling, no ecchymosis, no deformity, no scapular winging.\par No tenderness to palpation over shoulder, AC joint or trapezius.\par Forward flexion to 170; external rotation to 80 degrees (with shoulder abducted); internal rotation to 60 degrees (with shoulder abducted) with minimal pain\par 5/5 supraspinatus, infraspinatus and subscapularis\par Negative Durham test, positive impingement sign, negative O’Donaldo test.\par Speed’s and yergason negative\par Motor and sensory intact distally\par

## 2023-05-01 NOTE — IMAGING
[de-identified] : b/l shoulders\par No swelling, no ecchymosis, no deformity, no scapular winging.\par No tenderness to palpation over shoulder, AC joint or trapezius.\par Forward flexion to 170; external rotation to 80 degrees (with shoulder abducted); internal rotation to 60 degrees (with shoulder abducted) with minimal pain\par 5/5 supraspinatus, infraspinatus and subscapularis\par Negative Durham test, positive impingement sign, negative O’Donaldo test.\par Speed’s and yergason negative\par Motor and sensory intact distally\par

## 2023-05-02 ENCOUNTER — APPOINTMENT (OUTPATIENT)
Dept: FAMILY MEDICINE | Facility: HOSPITAL | Age: 53
End: 2023-05-02

## 2023-06-03 ENCOUNTER — APPOINTMENT (OUTPATIENT)
Dept: ORTHOPEDIC SURGERY | Facility: CLINIC | Age: 53
End: 2023-06-03
Payer: COMMERCIAL

## 2023-06-03 VITALS — BODY MASS INDEX: 46.65 KG/M2 | HEIGHT: 69 IN | WEIGHT: 315 LBS

## 2023-06-03 DIAGNOSIS — S39.012A STRAIN OF MUSCLE, FASCIA AND TENDON OF LOWER BACK, INITIAL ENCOUNTER: ICD-10-CM

## 2023-06-03 PROCEDURE — 72100 X-RAY EXAM L-S SPINE 2/3 VWS: CPT

## 2023-06-03 PROCEDURE — 99214 OFFICE O/P EST MOD 30 MIN: CPT

## 2023-06-03 PROCEDURE — 72070 X-RAY EXAM THORAC SPINE 2VWS: CPT

## 2023-06-03 PROCEDURE — 72040 X-RAY EXAM NECK SPINE 2-3 VW: CPT

## 2023-06-03 RX ORDER — NAPROXEN 500 MG/1
500 TABLET ORAL
Qty: 60 | Refills: 1 | Status: ACTIVE | COMMUNITY
Start: 2023-06-03 | End: 1900-01-01

## 2023-06-03 RX ORDER — METHOCARBAMOL 750 MG/1
750 TABLET, FILM COATED ORAL
Qty: 30 | Refills: 1 | Status: ACTIVE | COMMUNITY
Start: 2023-06-03 | End: 1900-01-01

## 2023-06-05 NOTE — DISCUSSION/SUMMARY
[de-identified] : Discussed medical mgmt, prescribed Naprosyn and Methocarbamol. Discussed exercise based rehabilitation, referred to PT. Discussed activity modification.

## 2023-06-05 NOTE — DATA REVIEWED
[I independently reviewed and interpreted images and report] : I independently reviewed and interpreted images and report [I reviewed the films/CD and additionally noted] : I reviewed the films/CD and additionally noted [FreeTextEntry2] : In office xrays thoracic ap/lat taken today demonstrate no vertebra fracture, mild degen changes [FreeTextEntry3] : In office xrays cervical ap/lat taken today demonstrate no fracture. Loss disc heights C5-6,6-7 [FreeTextEntry1] : I stop paperwork reviewed

## 2023-06-05 NOTE — PHYSICAL EXAM
[Normal Coordination] : normal coordination [Normal DTR UE/LE] : normal DTR UE/LE  [Normal Sensation] : normal sensation [Normal Mood and Affect] : normal mood and affect [Orientated] : orientated [Able to Communicate] : able to communicate [Normal Skin] : normal skin [No Rash] : no rash [No Ulcers] : no ulcers [No Lesions] : no lesions [No obvious lymphadenopathy in areas examined] : no obvious lymphadenopathy in areas examined [Well Developed] : well developed [Peripheral vascular exam is grossly normal] : peripheral vascular exam is grossly normal [No Respiratory Distress] : no respiratory distress [Flexion] : flexion [Extension] : extension [] : negative sitting straight leg raise
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
## 2023-07-14 ENCOUNTER — APPOINTMENT (OUTPATIENT)
Dept: ORTHOPEDIC SURGERY | Facility: CLINIC | Age: 53
End: 2023-07-14
Payer: COMMERCIAL

## 2023-07-14 PROCEDURE — 99214 OFFICE O/P EST MOD 30 MIN: CPT

## 2023-07-14 NOTE — PHYSICAL EXAM
[Normal Coordination] : normal coordination [Normal DTR UE/LE] : normal DTR UE/LE  [Normal Sensation] : normal sensation [Normal Mood and Affect] : normal mood and affect [Orientated] : orientated [Able to Communicate] : able to communicate [Normal Skin] : normal skin [No Rash] : no rash [No Ulcers] : no ulcers [No Lesions] : no lesions [No obvious lymphadenopathy in areas examined] : no obvious lymphadenopathy in areas examined [Well Developed] : well developed [Peripheral vascular exam is grossly normal] : peripheral vascular exam is grossly normal [No Respiratory Distress] : no respiratory distress [Flexion] : flexion [Extension] : extension [] : negative equivocal straight leg raise [FreeTextEntry3] : m
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
## 2023-07-14 NOTE — DISCUSSION/SUMMARY
[de-identified] : Discussed medical mgmt, prescribed Naprosyn and Methocarbamol. Discussed exercise based rehabilitation, referred to PT, and given requested massage therapy rx.  DIscussed if his pain persists we can consider advanced imaging.   Discussed activity modification.\par \par Prior to appointment and during encounter with patient extensive medical records were reviewed including but not limited to, hospital records, outpatient records, imaging results, and lab data.During this appointment the patient was examined, diagnoses were discussed and explained in a face to face manner. In addition extensive time was spent reviewing aforementioned diagnostic studies. Counseling including abnormal image results, differential diagnoses, treatment options, risk and benefits, lifestyle changes, current condition, and current medications was performed. Patient's comments, questions, and concerns were addressed and patient verbalized understanding. Based on this patient's presentation at our office, which is an orthopedic spine surgeon's office, this patient inherently / intrinsically has a risk, however minute, of developing issues such as Cauda equina syndrome, bowel and bladder changes, or progression of motor or neurological deficits such as paralysis which may be permanent.

## 2023-07-18 NOTE — ASSESSMENT
[FreeTextEntry1] : \par \par 50M with a PMH significant for Morbid Obesity, MYA, Thrombocytosis and Pre-DM who presents today for routine follow up, with complaints of LE Paresthesias and unremarkable findings on Neurological Foot Exam \par \par \par \par \par \par \par #T2DM\par -POCT A1C 2/21: 6.2%  (From 6.5% on 1/21) \par -Continue with Metformin 500mg qD\par -RTC in 3m for DM follow up \par -Pt strongly advised to monitor Carbohydrate intake \par -Diet and Exercise strongly encouraged \par -Pt expressed under standing to the above\par -Hypoglycemia protocol reviewed  \par \par \par #Obesity \par -Current BMI 58.04kg/m^2\par -Lossed 2lbs in the last 4months\par -Wt Loss Goal: 2lb per week \par -Ideal BMI <30 kg/m^2\par -Monitor calorie intake, <2,000 calories daily \par -Diet and Exercise encouraged \par \par \par \par #COVID-19 Precautions \par -Pt assessed for Eligibility for COVID Vaccine  \par -Practice Social Distancing \par -Practice hand hygiene \par -Continue with surgical Mask PPE \par -Contact the clinic with Sxs concerning for COVID \par \par #Health Care Maintenance \par -Schedule an appointment for annual CPE\par -Obtain the following labs at that time   CBC, CMP, A1C, TSH w/ reflex T4, Lipid Panel, VItamin D\par -Flu shot administered today/ Deferred\par -STD testing Offered: HIV, Hep B, Syphilis, GC/Chlam Urine\par \par \par \par #CSP \par -CSP Eligible: No \par -Pt resides in [facility]; [facility] \par \par \par \par #Future \par -F/u Above A1C, CBC\par -RTC in 3 months for continued f/u \par \par \par Case discussed with Dr. Box \par 
Home

## 2023-07-25 ENCOUNTER — APPOINTMENT (OUTPATIENT)
Dept: ORTHOPEDIC SURGERY | Facility: CLINIC | Age: 53
End: 2023-07-25
Payer: COMMERCIAL

## 2023-07-25 VITALS — HEIGHT: 69 IN | BODY MASS INDEX: 46.65 KG/M2 | WEIGHT: 315 LBS

## 2023-07-25 DIAGNOSIS — M79.18 MYALGIA, OTHER SITE: ICD-10-CM

## 2023-07-25 PROCEDURE — 73030 X-RAY EXAM OF SHOULDER: CPT | Mod: 50

## 2023-07-25 PROCEDURE — 99214 OFFICE O/P EST MOD 30 MIN: CPT

## 2023-07-25 NOTE — DISCUSSION/SUMMARY
[de-identified] : Reviewed all images with patient. \par MRI of bilateral shoulder to eval RTC tear.\par Rx Medrol Dose Rajesh.\par Follow up after MRI scan.\par \par \par \par \par -----------------------------------------------\par Home Exercise\par The patient is instructed on a home exercise program.\par \par CARLOS HARMAN Acting as a Scribe for Dr. Winter\par I, Carlos Harman, attest that this documentation has been prepared under the direction and in the presence of Provider Napoleon Winter MD.\par \par Activity Modification\par The patient was advised to modify their activities.\par \par Dx / Natural History\par The patient was advised of the diagnosis.  The natural history of the pathology was explained in full to the patient in layman's terms.  Several different treatment options were discussed and explained in full to the patient including the risks and benefits of both surgical and non-surgical treatments.  All questions and concerns were answered.\par \par Pain Guide Activities\par The patient was advised to let pain guide the gradual advancement of activities.\par \par RICE\par I explained to the patient that rest, ice, compression, and elevation would benefit them.  They may return to activity after follow-up or when they no longer have any pain.\par \par The patient's current medication management of their orthopedic diagnosis was reviewed today:\par (1) We discussed a comprehensive treatment plan that included possible pharmaceutical management involving the use of prescription strength medications including but not limited to options such as oral Naprosyn 500mg BID, once daily Meloxicam 15 mg, or 500-650 mg Tylenol versus over the counter oral medications and topical prescription NSAID Pennsaid vs over the counter Voltaren gel.\par (2) There is a moderate risk of morbidity with further treatment, especially from use of prescription strength medications and possible side effects of these medications which include upset stomach with oral medications, skin reactions to topical medications and cardiac/renal issues with long term use.\par (3) I recommended that the patient follow-up with their medical physician to discuss any significant specific potential issues with long term medication use such as interactions with current medications or with exacerbation of underlying medical comorbidities.\par (4) The benefits and risks associated with use of injectable, oral or topical, prescription and over the counter anti-inflammatory medications were discussed with the patient. The patient voiced understanding of the risks including but not limited to bleeding, stroke, kidney dysfunction, heart disease, and were referred to the black box warning label for further information.
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
## 2023-07-25 NOTE — PHYSICAL EXAM
[de-identified] : Neurovascularly intact distally \par \par Bilateral Shoulder:\par +Impingement test\par +Neer test\par +Durham test\par +Baylee sign\par 4-/5 Supraspinatus Strength

## 2023-07-25 NOTE — DATA REVIEWED
[FreeTextEntry1] : 03/31/23\par OC X Ray Left shoulder:4 views:\par Unremarkable\par \par 11/29/22\par OC X Ray Right Shoulder: 4 views:\par Unremarkable\par \par 04/12/23\par OC MRI right shoulder\par Impression:\par 1. Moderate distal supraspinatus and infraspinatus tendinopathy, with calcific tendinitis suspected, and \par moderate surrounding bursitis without tendon discontinuity.\par 2. Moderate-to-severe glenohumeral joint arthrosis with extensive labral tearing, osteophytes, effusion, \par synovitis, and subchondral cysts and capsular thickening.\par 3. Subscapularis tendinopathy, biceps tenosynovitis, AC joint arthrosis, and lateral acromial bone spurs.\par 4. Disproportionate teres minor muscle atrophy.\par 5. No findings to suggest acute fracture.\par 6. Patient motion degrades image quality on multiple imaging sequences.\par \par 7/25/23\par OC X RAY Bilateral Knee: 4 view:\par unremarkable

## 2023-07-27 ENCOUNTER — RESULT REVIEW (OUTPATIENT)
Age: 53
End: 2023-07-27

## 2023-08-01 ENCOUNTER — APPOINTMENT (OUTPATIENT)
Dept: ORTHOPEDIC SURGERY | Facility: CLINIC | Age: 53
End: 2023-08-01
Payer: COMMERCIAL

## 2023-08-01 VITALS — BODY MASS INDEX: 46.65 KG/M2 | WEIGHT: 315 LBS | HEIGHT: 69 IN

## 2023-08-01 PROCEDURE — L3670: CPT | Mod: RT

## 2023-08-01 PROCEDURE — 99214 OFFICE O/P EST MOD 30 MIN: CPT

## 2023-08-01 NOTE — DATA REVIEWED
[FreeTextEntry1] : 03/31/23 OC X Ray Left shoulder:4 views: Unremarkable  11/29/22 OC X Ray Right Shoulder: 4 views: Unremarkable  04/12/23 OC MRI right shoulder Impression: 1. Moderate distal supraspinatus and infraspinatus tendinopathy, with calcific tendinitis suspected, and  moderate surrounding bursitis without tendon discontinuity. 2. Moderate-to-severe glenohumeral joint arthrosis with extensive labral tearing, osteophytes, effusion,  synovitis, and subchondral cysts and capsular thickening. 3. Subscapularis tendinopathy, biceps tenosynovitis, AC joint arthrosis, and lateral acromial bone spurs. 4. Disproportionate teres minor muscle atrophy. 5. No findings to suggest acute fracture. 6. Patient motion degrades image quality on multiple imaging sequences.  7/25/23 OC X RAY Bilateral Knee: 4 view: unremarkable   7/27/23 [facility] MRI Left Shouder IMPRESSION:  Low-grade partial-thickness bursal surface and interstitial tear of the supraspinatus tendon extending into the insertion.  Moderate AC joint arthrosis.  Diffuse tearing of the posterior and anteroinferior labrum with small paralabral cyst at the 6:00 position.  Mild partial-thickness glenohumeral cartilage loss with subcortical cystic changes in the anterior glenoid.  Mild subacromial subdeltoid bursitis.  7/27/23 [facility] MRI Right Shoulder IMPRESSION:  Low to moderate grade partial-thickness interstitial delaminating tear of the supraspinatus tendon extending into the insertion with superimposed calcific tendinitis.  Low-grade partial-thickness articular surface tearing of the infraspinatus and subscapularis tendons.  SLAP tear extending into the biceps-labral anchor. Diffuse tear extension into the posterior labrum.  High-grade cartilage loss along the inferior glenoid with subcortical cystic changes.  Small glenohumeral joint effusion.  Mild subacromial subdeltoid bursitis.  Moderate AC joint arthrosis.

## 2023-08-01 NOTE — DISCUSSION/SUMMARY
[de-identified] : Reviewed all images with patient.  Discussed treatment options, the patient would like to follow through with surgery to the right shoulder. Patient will get in contact from  to schedule surgery.    Surgical Consent:  -Conservative treatment, nontreatment, nonsurgical intervention and surgical intervention treatment options have been reviewed with the patient.  The patient continues to be symptomatic [and has failed conservative treatment], and elects to move forward with surgical intervention.  The patient is indicated for RIGHT SHOULDER ARTHROSCOPIC RTC REPAIR, ARTH PBT, DEBRIDEMENT AND DECOMPRESSION/ACROMIOPLASTY and all indicated procedures. As such the alternatives, benefits and risks, of the above procedure, including but not limited to bleeding, infection, neurovascular injury, loss of limb, loss of life,  DVT, PE, RSD, inability to return to previous level of activity, inability to return to previous level of employment, advancement of or to osteoarthritic changes, joint instability or motion loss, hardware failure or migration,  failure to resolve all symptoms, failure to return to sports and need for further procedures, as well as specific risk of RE-TEAR, ARTHROFIBROSIS were discussed with the patient and/or their legal guardian who agreed to move forward with surgical intervention.  They have reviewed and signed the consent form today after expressing understanding of the above documented conversation. The patient or their representative will contact my office as instructed on the preoperative instruction sheet they received today to schedule surgery in a timely manner as discussed.  -As a post-operative protocol, I am prescribing an iceless cold/heat compression therapy device for at home use to be used 3-5 times per day at 40 degrees for 35 days as an alternative to pain medication. I would like my patient to begin with simultaneous cold & compression therapy at 10mm pressure. At the patients follow up I will determine whether they should continue with cold, or if they should transition to contrast cold/heat compression therapy. Unlike a conventional cold therapy unit that requires ice, the ThermX iceless device is set to a prescribed temperature that it will remain throughout the entire duration of use, whether that be cold compression, heat compression, or contrast compression. Cold therapy units that depend on ice melt over a very short period and do not provide compression which limits the compliance and effectiveness for pain/inflammation reduction that I am targeting for my patient. I have reached out to [facility] to supply this device as they are the exclusive provider of the ThermX and the patient will be contacted and instructed on how to utilize the device. ------------    ----------------------------------------------- Home Exercise The patient is instructed on a home exercise program.  CARLOS HARMAN Acting as a Scribe for Dr. Moy JEFF, Carlos Harman, attest that this documentation has been prepared under the direction and in the presence of Provider Napoleon Winter MD.  Activity Modification The patient was advised to modify their activities.  Dx / Natural History The patient was advised of the diagnosis.  The natural history of the pathology was explained in full to the patient in layman's terms.  Several different treatment options were discussed and explained in full to the patient including the risks and benefits of both surgical and non-surgical treatments.  All questions and concerns were answered.  Pain Guide Activities The patient was advised to let pain guide the gradual advancement of activities.  JOSE JEFF explained to the patient that rest, ice, compression, and elevation would benefit them.  They may return to activity after follow-up or when they no longer have any pain.  The patient's current medication management of their orthopedic diagnosis was reviewed today: (1) We discussed a comprehensive treatment plan that included possible pharmaceutical management involving the use of prescription strength medications including but not limited to options such as oral Naprosyn 500mg BID, once daily Meloxicam 15 mg, or 500-650 mg Tylenol versus over the counter oral medications and topical prescription NSAID Pennsaid vs over the counter Voltaren gel. (2) There is a moderate risk of morbidity with further treatment, especially from use of prescription strength medications and possible side effects of these medications which include upset stomach with oral medications, skin reactions to topical medications and cardiac/renal issues with long term use. (3) I recommended that the patient follow-up with their medical physician to discuss any significant specific potential issues with long term medication use such as interactions with current medications or with exacerbation of underlying medical comorbidities. (4) The benefits and risks associated with use of injectable, oral or topical, prescription and over the counter anti-inflammatory medications were discussed with the patient. The patient voiced understanding of the risks including but not limited to bleeding, stroke, kidney dysfunction, heart disease, and were referred to the black box warning label for further information.
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
## 2023-08-01 NOTE — PHYSICAL EXAM
[de-identified] : Neurovascularly intact distally   Bilateral Shoulder: +Impingement test +Neer test +Durham test +Baylee sign 4-/5 Supraspinatus Strength

## 2023-08-21 ENCOUNTER — APPOINTMENT (OUTPATIENT)
Dept: PHYSICAL MEDICINE AND REHAB | Facility: CLINIC | Age: 53
End: 2023-08-21

## 2023-08-25 ENCOUNTER — APPOINTMENT (OUTPATIENT)
Dept: ORTHOPEDIC SURGERY | Facility: CLINIC | Age: 53
End: 2023-08-25

## 2023-08-31 ENCOUNTER — APPOINTMENT (OUTPATIENT)
Dept: ORTHOPEDIC SURGERY | Facility: AMBULATORY SURGERY CENTER | Age: 53
End: 2023-08-31

## 2023-09-08 ENCOUNTER — APPOINTMENT (OUTPATIENT)
Dept: ORTHOPEDIC SURGERY | Facility: CLINIC | Age: 53
End: 2023-09-08
Payer: COMMERCIAL

## 2023-09-08 VITALS — HEIGHT: 69 IN | WEIGHT: 315 LBS | BODY MASS INDEX: 46.65 KG/M2

## 2023-09-08 PROCEDURE — 99213 OFFICE O/P EST LOW 20 MIN: CPT

## 2023-09-10 NOTE — DATA REVIEWED
[FreeTextEntry1] : I stop paperwork reviewed Shoulder orthopedic progress notes reviewed PT progress notes reviewed

## 2023-09-10 NOTE — PHYSICAL EXAM
[Normal Coordination] : normal coordination [Normal DTR UE/LE] : normal DTR UE/LE  [Normal Sensation] : normal sensation [Normal Mood and Affect] : normal mood and affect [Orientated] : orientated [Able to Communicate] : able to communicate [Normal Skin] : normal skin [No Rash] : no rash [No Ulcers] : no ulcers [No Lesions] : no lesions [No obvious lymphadenopathy in areas examined] : no obvious lymphadenopathy in areas examined [Well Developed] : well developed [Peripheral vascular exam is grossly normal] : peripheral vascular exam is grossly normal [No Respiratory Distress] : no respiratory distress [Flexion] : flexion [Extension] : extension [] : negative equivocal straight leg raise

## 2023-09-10 NOTE — DISCUSSION/SUMMARY
[de-identified] : Discussed continued exercise-based rehabilitation, renewed PT as he continues to make progress. Counseled patient to have dialogue with physical therapist to avoid exercises producing shoulder pain.  Discussed if his pain persists, we can consider advanced imaging. He will continue to follow up with Dr. Winter, he is indicated for shoulder surgery 10/6/23. FUV 6 WKS.   Prior to appointment and during encounter with patient extensive medical records were reviewed including but not limited to, hospital records, outpatient records, imaging results, and lab data.During this appointment the patient was examined, diagnoses were discussed and explained in a face to face manner. In addition extensive time was spent reviewing aforementioned diagnostic studies. Counseling including abnormal image results, differential diagnoses, treatment options, risk and benefits, lifestyle changes, current condition, and current medications was performed. Patient's comments, questions, and concerns were addressed and patient verbalized understanding. Based on this patient's presentation at our office, which is an orthopedic spine surgeon's office, this patient inherently / intrinsically has a risk, however minute, of developing issues such as Cauda equina syndrome, bowel and bladder changes, or progression of motor or neurological deficits such as paralysis which may be permanent.   GRACE PATTON Acting as a Scribe for Dr. Andrew JEFF, Grace Patton, attest that this documentation has been prepared under the direction and in the presence of Provider Jai Morales MD.
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
## 2023-09-12 ENCOUNTER — APPOINTMENT (OUTPATIENT)
Dept: ORTHOPEDIC SURGERY | Facility: CLINIC | Age: 53
End: 2023-09-12

## 2023-09-22 NOTE — DATA REVIEWED
LVM for parent asking for call back to schedule    [Cervical Spine] : cervical spine [Outside X-rays] : outside x-rays [Lumbar Spine] : lumbar spine [I independently reviewed and interpreted images and report] : I independently reviewed and interpreted images and report [FreeTextEntry2] : trace anterolisthesis L4-5 [FreeTextEntry1] : I stop paperwork reviewed

## 2023-09-27 ENCOUNTER — OUTPATIENT (OUTPATIENT)
Dept: OUTPATIENT SERVICES | Facility: HOSPITAL | Age: 53
LOS: 1 days | End: 2023-09-27
Payer: COMMERCIAL

## 2023-09-27 ENCOUNTER — APPOINTMENT (OUTPATIENT)
Dept: FAMILY MEDICINE | Facility: HOSPITAL | Age: 53
End: 2023-09-27

## 2023-09-27 VITALS
OXYGEN SATURATION: 98 % | SYSTOLIC BLOOD PRESSURE: 135 MMHG | BODY MASS INDEX: 48.29 KG/M2 | RESPIRATION RATE: 17 BRPM | TEMPERATURE: 98.7 F | DIASTOLIC BLOOD PRESSURE: 85 MMHG | WEIGHT: 315 LBS | HEART RATE: 69 BPM

## 2023-09-27 DIAGNOSIS — Z00.00 ENCOUNTER FOR GENERAL ADULT MEDICAL EXAMINATION WITHOUT ABNORMAL FINDINGS: ICD-10-CM

## 2023-09-27 DIAGNOSIS — Z98.890 OTHER SPECIFIED POSTPROCEDURAL STATES: Chronic | ICD-10-CM

## 2023-09-27 PROCEDURE — G0463: CPT

## 2023-09-27 RX ORDER — METHYLPREDNISOLONE 4 MG/1
4 TABLET ORAL
Qty: 1 | Refills: 0 | Status: COMPLETED | COMMUNITY
Start: 2023-07-25 | End: 2023-09-27

## 2023-09-28 DIAGNOSIS — Z01.818 ENCOUNTER FOR OTHER PREPROCEDURAL EXAMINATION: ICD-10-CM

## 2023-09-29 ENCOUNTER — APPOINTMENT (OUTPATIENT)
Dept: PULMONOLOGY | Facility: CLINIC | Age: 53
End: 2023-09-29
Payer: COMMERCIAL

## 2023-09-29 VITALS
OXYGEN SATURATION: 96 % | SYSTOLIC BLOOD PRESSURE: 117 MMHG | BODY MASS INDEX: 33.18 KG/M2 | HEART RATE: 77 BPM | WEIGHT: 224 LBS | HEIGHT: 69 IN | DIASTOLIC BLOOD PRESSURE: 81 MMHG

## 2023-09-29 PROCEDURE — 94729 DIFFUSING CAPACITY: CPT

## 2023-09-29 PROCEDURE — 94726 PLETHYSMOGRAPHY LUNG VOLUMES: CPT

## 2023-09-29 PROCEDURE — 94010 BREATHING CAPACITY TEST: CPT

## 2023-10-03 ENCOUNTER — APPOINTMENT (OUTPATIENT)
Dept: PULMONOLOGY | Facility: CLINIC | Age: 53
End: 2023-10-03
Payer: COMMERCIAL

## 2023-10-03 ENCOUNTER — NON-APPOINTMENT (OUTPATIENT)
Age: 53
End: 2023-10-03

## 2023-10-03 ENCOUNTER — APPOINTMENT (OUTPATIENT)
Dept: CARDIOLOGY | Facility: CLINIC | Age: 53
End: 2023-10-03
Payer: COMMERCIAL

## 2023-10-03 VITALS — SYSTOLIC BLOOD PRESSURE: 111 MMHG | DIASTOLIC BLOOD PRESSURE: 75 MMHG

## 2023-10-03 VITALS
OXYGEN SATURATION: 95 % | HEIGHT: 69 IN | RESPIRATION RATE: 17 BRPM | WEIGHT: 315 LBS | BODY MASS INDEX: 46.65 KG/M2 | HEART RATE: 61 BPM

## 2023-10-03 VITALS
BODY MASS INDEX: 47.11 KG/M2 | WEIGHT: 315 LBS | DIASTOLIC BLOOD PRESSURE: 94 MMHG | HEART RATE: 68 BPM | RESPIRATION RATE: 16 BRPM | SYSTOLIC BLOOD PRESSURE: 137 MMHG | OXYGEN SATURATION: 94 %

## 2023-10-03 VITALS — HEART RATE: 76 BPM | DIASTOLIC BLOOD PRESSURE: 104 MMHG | SYSTOLIC BLOOD PRESSURE: 160 MMHG

## 2023-10-03 DIAGNOSIS — Z01.810 ENCOUNTER FOR PREPROCEDURAL CARDIOVASCULAR EXAMINATION: ICD-10-CM

## 2023-10-03 DIAGNOSIS — R00.1 ENCOUNTER FOR PREPROCEDURAL CARDIOVASCULAR EXAMINATION: ICD-10-CM

## 2023-10-03 DIAGNOSIS — Z01.811 ENCOUNTER FOR PREPROCEDURAL RESPIRATORY EXAMINATION: ICD-10-CM

## 2023-10-03 PROCEDURE — 99204 OFFICE O/P NEW MOD 45 MIN: CPT

## 2023-10-03 PROCEDURE — 93000 ELECTROCARDIOGRAM COMPLETE: CPT | Mod: NC

## 2023-10-03 PROCEDURE — 99215 OFFICE O/P EST HI 40 MIN: CPT

## 2023-10-03 RX ORDER — NAPROXEN 250 MG/1
250 TABLET ORAL
Qty: 14 | Refills: 0 | Status: DISCONTINUED | COMMUNITY
Start: 2022-10-01 | End: 2023-10-03

## 2023-10-03 RX ORDER — NAPROXEN 500 MG/1
500 TABLET ORAL
Qty: 60 | Refills: 0 | Status: DISCONTINUED | COMMUNITY
Start: 2023-05-01 | End: 2023-10-03

## 2023-10-05 ENCOUNTER — OUTPATIENT (OUTPATIENT)
Dept: OUTPATIENT SERVICES | Facility: HOSPITAL | Age: 53
LOS: 1 days | End: 2023-10-05
Payer: COMMERCIAL

## 2023-10-05 ENCOUNTER — APPOINTMENT (OUTPATIENT)
Dept: FAMILY MEDICINE | Facility: HOSPITAL | Age: 53
End: 2023-10-05

## 2023-10-05 VITALS
TEMPERATURE: 98.6 F | OXYGEN SATURATION: 95 % | BODY MASS INDEX: 48.29 KG/M2 | DIASTOLIC BLOOD PRESSURE: 88 MMHG | RESPIRATION RATE: 16 BRPM | HEART RATE: 61 BPM | SYSTOLIC BLOOD PRESSURE: 134 MMHG | WEIGHT: 315 LBS

## 2023-10-05 DIAGNOSIS — R79.1 ABNORMAL COAGULATION PROFILE: ICD-10-CM

## 2023-10-05 DIAGNOSIS — Z00.00 ENCOUNTER FOR GENERAL ADULT MEDICAL EXAMINATION WITHOUT ABNORMAL FINDINGS: ICD-10-CM

## 2023-10-05 DIAGNOSIS — Z98.890 OTHER SPECIFIED POSTPROCEDURAL STATES: Chronic | ICD-10-CM

## 2023-10-05 LAB
APTT BLD: 36.6 SEC — HIGH (ref 24.5–35.6)
APTT BLD: 36.9 SEC — HIGH (ref 24.5–35.6)
INR BLD: 0.98 RATIO — SIGNIFICANT CHANGE UP (ref 0.85–1.18)
INR BLD: 0.99 RATIO — SIGNIFICANT CHANGE UP (ref 0.85–1.18)
PROTHROM AB SERPL-ACNC: 11.5 SEC — SIGNIFICANT CHANGE UP (ref 9.5–13)
PROTHROM AB SERPL-ACNC: 11.6 SEC — SIGNIFICANT CHANGE UP (ref 9.5–13)

## 2023-10-05 PROCEDURE — 85610 PROTHROMBIN TIME: CPT

## 2023-10-05 PROCEDURE — 85730 THROMBOPLASTIN TIME PARTIAL: CPT

## 2023-10-05 PROCEDURE — 36415 COLL VENOUS BLD VENIPUNCTURE: CPT

## 2023-10-05 PROCEDURE — G0463: CPT

## 2023-10-06 ENCOUNTER — APPOINTMENT (OUTPATIENT)
Dept: ORTHOPEDIC SURGERY | Facility: HOSPITAL | Age: 53
End: 2023-10-06

## 2023-10-06 DIAGNOSIS — R79.1 ABNORMAL COAGULATION PROFILE: ICD-10-CM

## 2023-10-06 DIAGNOSIS — Z01.818 ENCOUNTER FOR OTHER PREPROCEDURAL EXAMINATION: ICD-10-CM

## 2023-10-06 RX ORDER — ONDANSETRON 4 MG/1
4 TABLET, ORALLY DISINTEGRATING ORAL EVERY 8 HOURS
Qty: 12 | Refills: 0 | Status: ACTIVE | COMMUNITY
Start: 2023-10-06 | End: 1900-01-01

## 2023-10-06 RX ORDER — OXYCODONE AND ACETAMINOPHEN 5; 325 MG/1; MG/1
5-325 TABLET ORAL
Qty: 40 | Refills: 0 | Status: ACTIVE | COMMUNITY
Start: 2023-10-06 | End: 1900-01-01

## 2023-10-06 RX ORDER — DOCUSATE SODIUM 50 MG/1
50 CAPSULE, LIQUID FILLED ORAL
Qty: 21 | Refills: 0 | Status: ACTIVE | COMMUNITY
Start: 2023-10-06 | End: 1900-01-01

## 2023-10-06 RX ORDER — IBUPROFEN 800 MG/1
800 TABLET, FILM COATED ORAL TWICE DAILY
Qty: 28 | Refills: 0 | Status: ACTIVE | COMMUNITY
Start: 2023-10-06 | End: 1900-01-01

## 2023-10-06 NOTE — ED ADULT NURSE NOTE - EXTENSIONS OF SELF_ADULT
[facility] - Surgery ([facility])  Critical Care - Surgery  History & Physical    Patient Name: Angela Trascher Lejeune  MRN: [id number]  Admission Date: 9/28/2023  Code Status: Full Code  Attending Physician: Kevin Mays, *   Primary Care Provider: Haven Gary MD   Principal Problem: Duodenal stricture    Subjective:     HPI:  Angela Trascher Lejeune is a 52yo F with PMH Crohn's disease, POTS, gastric varices, alcohol abuse, bipolar disorder who was recently seen in surgical oncology clinic 9/26/23 for evaluation of a gastric outlet obstruction. She reports her Crohns has been well controlled over the last couple of years with Stelara. Her last dose was in July. She has had past dilations of a duodenal stricture from her Crohn's, last on 9/15/23. Pathology revealed acute peptic duodenitis, antral bx negative for H pylori. In terms of operations, previously for her crohns disease she endorses a small bowel resection that was performed in the 1990s.     She [place] to [facility] [place] a transfer from OSH. She presented to the ER at OSH the night of 9/27/23 with worsening symptoms of n/v and abdominal pain. She is now s/p Delfino-Y gastrojejunostomy, g-tube, j-tube placement on 10/4 for gastric outlet obstruction. She was taken back to the OR today for evacuation of a hematoma. She remained extubated following the case and is being transferred to SICU post-operatively. She has had issues with pain control, hypertension, and tachycardia. She was seen in the PACU due to limited availability of beds in the SICU. She is intubated and sedated with propofol. She is hemodynamically stable, in no apparent distress.       Hospital/ICU Course:  No notes on file    Follow-up For: Procedure(s) (LRB):  LAPAROTOMY, EXPLORATORY (N/A)  WASHOUT, HEMATOMA    Post-Operative Day: Day of Surgery     Past Medical History:   Diagnosis Date    Abnormal Pap smear     + HPV    Bipolar 1 disorder     Closed bimalleolar fracture of left  ankle     Crohn's disease S/P surgery (ileum, possible gastroduodenal with ulcers)     1998-ileocectomy (9 inches ileum)    Encounter for blood transfusion     Gastric and duodenal ulcers of unclear etiology     Gastroparesis     H/O ETOH abuse     History of HSV-2 infection     Immunosuppressed status     Iron deficiency anemia, multiple blood transfusions     POTS (postural orthostatic tachycardia syndrome)     Seizures     states last seizure 2009       Past Surgical History:   Procedure Laterality Date    ANKLE HARDWARE REMOVAL Left 9/6/2023    Procedure: REMOVAL, HARDWARE, ANKLE;  Surgeon: Michael Lacey MD;  Location: [facility];  Service: Orthopedics;  Laterality: Left;    APPENDECTOMY      BRAIN SURGERY  2009    fx skull due to seizure with 2 pins    BYPASS OF DUODENUM BY ANASTOMOSIS OF DUODENUM TO JEJUNUM N/A 10/4/2023    Procedure: CREATION, BYPASS, DUODENUM, BY ANASTOMOSIS OF DUODENUM TO JEJUNUM, PLACEMNENT OF GASTROSTOMY TUBE, JEJUNOSTOMY TUBE;  Surgeon: Kevin Mays MD;  Location: [facility] 2ND FLR;  Service: General;  Laterality: N/A;    CHOLECYSTECTOMY      COLON SURGERY      Partial colectomy    COLONOSCOPY Left 7/10/2019    Procedure: COLONOSCOPY;  Surgeon: Papi Martinez Jr., MD;  Location: [facility];  Service: Endoscopy;  Laterality: Left;    COLONOSCOPY N/A 12/6/2022    Procedure: COLONOSCOPY;  Surgeon: Zandra Mallory MD;  Location: [facility] (4TH FLR);  Service: Endoscopy;  Laterality: N/A;  poor prep on 11/28/22.  per Dr Mallory-Miralax 5 days prior with gastroparesis diet-3 days full liquid. also Mag citrate day before     instr handed to pt-GT    EGD, WITH BALLOON DILATION  7/24/2023    Procedure: EGD, WITH BALLOON DILATION;  Surgeon: Huang Licea MD;  Location: [facility];  Service: Endoscopy;;    ESOPHAGOGASTRODUODENOSCOPY Left 6/5/2019    Procedure: EGD (ESOPHAGOGASTRODUODENOSCOPY);  Surgeon: ROLA Villagran MD;  Location: [facility];  Service:  Endoscopy;  Laterality: Left;    ESOPHAGOGASTRODUODENOSCOPY Left 3/16/2020    Procedure: EGD (ESOPHAGOGASTRODUODENOSCOPY);  Surgeon: Clive Núñez MD;  Location: [facility] ENDO;  Service: Endoscopy;  Laterality: Left;    ESOPHAGOGASTRODUODENOSCOPY N/A 4/17/2020    Procedure: EGD (ESOPHAGOGASTRODUODENOSCOPY);  Surgeon: Papi Martinez Jr., MD;  Location: [facility] ENDO;  Service: Endoscopy;  Laterality: N/A;  with Push Enteroscopy    ESOPHAGOGASTRODUODENOSCOPY N/A 7/6/2020    Procedure: EGD (ESOPHAGOGASTRODUODENOSCOPY)   possible peg;  Surgeon: Papi Martinez Jr., MD;  Location: [facility];  Service: Endoscopy;  Laterality: N/A;    ESOPHAGOGASTRODUODENOSCOPY N/A 7/9/2020    Procedure: EGD (ESOPHAGOGASTRODUODENOSCOPY);  Surgeon: Papi Martinez Jr., MD;  Location: [facility];  Service: Endoscopy;  Laterality: N/A;  J or Peg tube    ESOPHAGOGASTRODUODENOSCOPY N/A 9/3/2020    Procedure: EGD (ESOPHAGOGASTRODUODENOSCOPY);  Surgeon: Papi Martinez Jr., MD;  Location: [facility];  Service: Endoscopy;  Laterality: N/A;  per drs order Stoma, Peds, w/single Lumen, J-Tube placement    ESOPHAGOGASTRODUODENOSCOPY N/A 12/29/2020    Procedure: EGD (ESOPHAGOGASTRODUODENOSCOPY);  Surgeon: Papi Martinez Jr., MD;  Location: [facility];  Service: Endoscopy;  Laterality: N/A;    ESOPHAGOGASTRODUODENOSCOPY N/A 2/23/2021    Procedure: EGD (ESOPHAGOGASTRODUODENOSCOPY);  Surgeon: Papi Martinez Jr., MD;  Location: [facility] ENDO;  Service: Endoscopy;  Laterality: N/A;    ESOPHAGOGASTRODUODENOSCOPY N/A 2/3/2022    Procedure: EGD (ESOPHAGOGASTRODUODENOSCOPY);  Surgeon: Papi Martinez Jr., MD;  Location: [facility];  Service: Endoscopy;  Laterality: N/A;    ESOPHAGOGASTRODUODENOSCOPY N/A 8/13/2022    Procedure: EGD (ESOPHAGOGASTRODUODENOSCOPY);  Surgeon: Huang Licea MD;  Location: [facility];  Service: Gastroenterology;  Laterality: N/A;    ESOPHAGOGASTRODUODENOSCOPY N/A 10/16/2022    Procedure: EGD (ESOPHAGOGASTRODUODENOSCOPY);   Surgeon: Huang Licea MD;  Location: [facility];  Service: Gastroenterology;  Laterality: N/A;    ESOPHAGOGASTRODUODENOSCOPY N/A 11/28/2022    Procedure: EGD (ESOPHAGOGASTRODUODENOSCOPY);  Surgeon: Zandra Mallory MD;  [facility]  Service: Endoscopy;  Laterality: N/A;    ESOPHAGOGASTRODUODENOSCOPY N/A 7/24/2023    Procedure: EGD (ESOPHAGOGASTRODUODENOSCOPY);  Surgeon: Huang Licea MD;  Location: [facility];  Service: Endoscopy;  Laterality: N/A;    ESOPHAGOGASTRODUODENOSCOPY N/A 9/15/2023    Procedure: EGD (ESOPHAGOGASTRODUODENOSCOPY);  Surgeon: ROLA Villagran MD;  Location: [facility];  Service: Endoscopy;  Laterality: N/A;    FEMUR FRACTURE SURGERY Right 2006    HYSTERECTOMY      INSERTION OF TUNNELED CENTRAL VENOUS CATHETER (CVC) WITH SUBCUTANEOUS PORT Left 8/14/2019    Procedure: [id number];  Surgeon: Miquel Sargent MD;  Location: [facility] OR;  Service: General;  Laterality: Left;    INSERTION OR REPLACEMENT OF PERCUTANEOUS ENDOSCOPIC JEJUNOSTOMY TUBE  12/29/2020    Procedure: INSERTION OR REPLACEMENT, JEJUNOSTOMY TUBE, PERCUTANEOUS, ENDOSCOPIC;  Surgeon: Papi Martinez Jr., MD;  Location: [facility];  Service: Endoscopy;;    laparoscopy for endometriosis      x5    LEFT HEART CATHETERIZATION Left 3/29/2023    Procedure: Left heart cath;  Surgeon: Riley Tipton MD;  Location: [facility] CATH;  Service: Cardiology;  Laterality: Left;    OOPHORECTOMY      right ovary removed only    OPEN REDUCTION AND INTERNAL FIXATION (ORIF) OF INJURY OF ANKLE Left 4/10/2023    Procedure: ORIF, ANKLE;  Surgeon: Michael Lacey MD;  Location: [facility] OR;  Service: Orthopedics;  Laterality: Left;    PEG TUBE REMOVAL  4/1/2021    Procedure: REMOVAL, PEG TUBE;  Surgeon: Papi Martinez Jr., MD;  Location: [facility];  Service: Endoscopy;;    TONSILLECTOMY         Review of patient's allergies indicates:   Allergen Reactions    Tramadol Other (See Comments)     Seizures      Adhesive  "Other (See Comments)     STATES, " TEARS SKIN"     Demerol [meperidine]      Seizures      Hydrocodone Hallucinations    Reglan [metoclopramide]        Family History    None       Tobacco Use    Smoking status: Former     Current packs/day: 0.00     Average packs/day: 0.5 packs/day for 6.0 years (3.0 ttl pk-yrs)     Types: Cigarettes     Start date: 5/10/2005     Quit date: 5/10/2011     Years since quittin.4    Smokeless tobacco: Never   Substance and Sexual Activity    Alcohol use: Yes     Comment: socially    Drug use: Never    Sexual activity: Yes     Partners: Male      Review of Systems  Objective:     Vital Signs (Most Recent):  Temp: 100.1 °F (37.8 °C) (10/05/23 1801)  Pulse: (!) 121 (10/05/23 1915)  Resp: (!) 24 (10/05/23 1955)  BP: (!) 157/72 (10/05/23 1930)  SpO2: 100 % (10/05/23 1915) Vital Signs (24h Range):  Temp:  [96.2 °F (35.7 °C)-100.1 °F (37.8 °C)] 100.1 °F (37.8 °C)  Pulse:  [101-143] 121  Resp:  [15-27] 24  SpO2:  [98 %-100 %] 100 %  BP: (111-204)/(57-93) 157/72     Weight: 53.1 kg (117 lb)  Body mass index is 20.07 kg/m².      Intake/Output Summary (Last 24 hours) at 10/5/2023 2012  Last data filed at 10/5/2023 1812  Gross per 24 hour   Intake 1897.5 ml   Output 4050 ml   Net -2152.5 ml          Physical Exam  Vitals and nursing note reviewed.   Constitutional:       Interventions: She is sedated and intubated.   HENT:      Head: Normocephalic and atraumatic.   Cardiovascular:      Rate and Rhythm: Regular rhythm. Tachycardia present.   Pulmonary:      Effort: She is intubated.   Abdominal:      General: Abdomen is flat.      Palpations: Abdomen is soft.      Comments: Provena in place, g-tube, j-tube in place.   Skin:     General: Skin is warm and dry.            Vents:  Vent Mode: A/C (10/05/23 1904)  Ventilator Initiated: Yes (10/05/23 1819)  Set Rate: 16 BPM (10/05/23 1904)  Vt Set: 360 mL (10/05/23 1904)  PEEP/CPAP: 5 cmH20 (10/05/23 1904)  Oxygen Concentration (%): 50 " (10/05/23 1904)  Peak Airway Pressure: 20 cmH20 (10/05/23 1904)  Plateau Pressure: 0 cmH20 (10/05/23 1904)  Total Ve: 6.9 L/m (10/05/23 1904)  Negative Inspiratory Force (cm H2O): 0 (10/05/23 1904)    Lines/Drains/Airways       Peripherally Inserted Central Catheter Line  Duration             PICC Double Lumen 09/29/23 1431 right basilic 6 days              Central Venous Catheter Line  Duration                  PowerPort A Cath Single Lumen 10/05/23 1320 Atrial Left <1 day              Drain  Duration                  Gastrostomy/Enterostomy 10/04/23 1117 Gastrostomy tube w/o balloon LUQ 1 day         Gastrostomy/Enterostomy 10/04/23 1149 Jejunostomy tube LUQ 1 day         Urethral Catheter 10/04/23 0820 Non-latex;Straight-tip 16 Fr. 1 day         Closed/Suction Drain 10/05/23 1722 Tube - 1 Right Abdomen Bulb 19 Fr. <1 day              Airway  Duration                  Airway - Non-Surgical 10/05/23 1534 <1 day                    Significant Labs:    CBC/Anemia Profile:  Recent Labs   Lab 10/05/23  0505 10/05/23  1117 10/05/23  1842 10/05/23  1925   WBC 14.48* 11.08  --  10.73   HGB 7.6* 6.7*  --  9.5*   HCT 24.7* 22.9* 26* 30.5*    146*  --  131*   * 103*  --  94   RDW 14.5 14.3  --  20.1*        Chemistries:  Recent Labs   Lab 10/04/23  0441 10/05/23  0505    139   K 4.0 4.6    108   CO2 28 26   BUN 14 28*   CREATININE 0.7 0.8   CALCIUM 9.0 9.4   ALBUMIN 3.1* 3.1*   PROT 6.5 6.4   BILITOT 0.1 0.3   ALKPHOS 96 109   ALT 15 46*   AST 34 106*   MG 2.0 2.1   PHOS 3.9 2.0*       All pertinent labs within the past 24 hours have been reviewed.    Significant Imaging: I have reviewed all pertinent imaging results/findings within the past 24 hours.    Assessment/Plan:     GI  * Duodenal stricture    Neuro/Psych:   -- Sedation: propofol   -- Pain: fentanyl gtt             Cards:   -- HDS  -- tachycardic      Pulm:   -- Goal O2 sat > 90%  --intubated on a/c rate 16/ FiO2 50%, PEEP 5, Vt 360  --  Wean as able      Renal:  -- Keep harmon for strict I/O  -- BUN/Cr 28/0.8      FEN / GI:   -- Net -753  -- Replace lytes as needed  -- Nutrition: NPO  --TPN       ID:   -- Tm: afebrile; WBC 10.73  -- Abx none      Heme/Onc:   -- H/H stable 9.5/30.5  -- Daily CBC      Endo:   -- Gluc goal 140-180      PPx:   Feeding: NPO  Analgesia/Sedation: fentanyl gtt / propofol  Thromboembolic prevention: SCDs  HOB >30:   Stress Ulcer ppx: protonix  Glucose control: Critical care goal 140-180 g/dl, ISS    Lines/Drains/Airway: NHAN drain, j-tube, g-tube, provena wound vac, PICC, port, ETT      Dispo/Code Status/Palliative:   -- SICU / Full Code        Critical secondary to Patient has a condition that poses threat to life and bodily function    Critical care was time spent personally by me on the following activities: development of treatment plan with patient or surrogate and bedside caregivers, discussions with consultants, evaluation of patient's response to treatment, examination of patient, ordering and performing treatments and interventions, ordering and review of laboratory studies, ordering and review of radiographic studies, pulse oximetry, re-evaluation of patient's condition.  This critical care time did not overlap with that of any other provider or involve time for any procedures.     Sheila Melton MD  Critical Care - Surgery  [facility] ([facility])   Eyeglasses

## 2023-10-09 ENCOUNTER — NON-APPOINTMENT (OUTPATIENT)
Age: 53
End: 2023-10-09

## 2023-10-13 ENCOUNTER — NON-APPOINTMENT (OUTPATIENT)
Age: 53
End: 2023-10-13

## 2023-10-17 ENCOUNTER — APPOINTMENT (OUTPATIENT)
Dept: ORTHOPEDIC SURGERY | Facility: CLINIC | Age: 53
End: 2023-10-17

## 2023-10-17 NOTE — REVIEW OF SYSTEMS
----- Message from Valeria Briggs NP sent at 10/17/2023  3:56 PM CDT -----  Labs Reviewed:    10-    Vitamin B 12 1207 ^, RPR Neg, HIV neg, FA level 16.0 HC 6.6 NL, TSH level 3.896 NL, T4 0.83 NL,     Labs WNL, Vitamin B 12 is an acceptable variation    [Negative] : Endocrine [SOB on Exertion] : sob on exertion

## 2023-10-20 ENCOUNTER — APPOINTMENT (OUTPATIENT)
Dept: ORTHOPEDIC SURGERY | Facility: CLINIC | Age: 53
End: 2023-10-20
Payer: COMMERCIAL

## 2023-10-20 VITALS — WEIGHT: 315 LBS | HEIGHT: 69 IN | BODY MASS INDEX: 46.65 KG/M2

## 2023-10-20 DIAGNOSIS — S16.1XXS STRAIN OF MUSCLE, FASCIA AND TENDON AT NECK LEVEL, SEQUELA: ICD-10-CM

## 2023-10-20 PROCEDURE — 99213 OFFICE O/P EST LOW 20 MIN: CPT

## 2023-10-22 PROBLEM — S16.1XXS STRAIN OF NECK MUSCLE, SEQUELA: Status: ACTIVE | Noted: 2023-06-03

## 2023-10-28 ENCOUNTER — APPOINTMENT (OUTPATIENT)
Dept: FAMILY MEDICINE | Facility: HOSPITAL | Age: 53
End: 2023-10-28

## 2023-10-28 ENCOUNTER — OUTPATIENT (OUTPATIENT)
Dept: OUTPATIENT SERVICES | Facility: HOSPITAL | Age: 53
LOS: 1 days | End: 2023-10-28
Payer: COMMERCIAL

## 2023-10-28 VITALS
HEART RATE: 61 BPM | OXYGEN SATURATION: 97 % | HEIGHT: 69 IN | RESPIRATION RATE: 18 BRPM | WEIGHT: 315 LBS | BODY MASS INDEX: 46.65 KG/M2 | TEMPERATURE: 98.6 F

## 2023-10-28 VITALS
OXYGEN SATURATION: 97 % | DIASTOLIC BLOOD PRESSURE: 80 MMHG | TEMPERATURE: 98.6 F | RESPIRATION RATE: 17 BRPM | HEART RATE: 61 BPM | SYSTOLIC BLOOD PRESSURE: 121 MMHG

## 2023-10-28 DIAGNOSIS — Z98.890 OTHER SPECIFIED POSTPROCEDURAL STATES: Chronic | ICD-10-CM

## 2023-10-28 DIAGNOSIS — M25.512 PAIN IN LEFT SHOULDER: ICD-10-CM

## 2023-10-28 DIAGNOSIS — M77.52 OTHER ENTHESOPATHY OF LT FOOT AND ANKLE: ICD-10-CM

## 2023-10-28 DIAGNOSIS — Z86.2 PERSONAL HISTORY OF DISEASES OF THE BLOOD AND BLOOD-FORMING ORGANS AND CERTAIN DISORDERS INVOLVING THE IMMUNE MECHANISM: ICD-10-CM

## 2023-10-28 DIAGNOSIS — Z00.00 ENCOUNTER FOR GENERAL ADULT MEDICAL EXAMINATION WITHOUT ABNORMAL FINDINGS: ICD-10-CM

## 2023-10-28 DIAGNOSIS — Z01.818 ENCOUNTER FOR OTHER PREPROCEDURAL EXAMINATION: ICD-10-CM

## 2023-10-28 DIAGNOSIS — S39.011A STRAIN OF MUSCLE, FASCIA AND TENDON OF ABDOMEN, INITIAL ENCOUNTER: ICD-10-CM

## 2023-10-28 DIAGNOSIS — Z86.19 PERSONAL HISTORY OF OTHER INFECTIOUS AND PARASITIC DISEASES: ICD-10-CM

## 2023-10-28 DIAGNOSIS — K43.9 VENTRAL HERNIA W/OUT OBSTRUCTION OR GANGRENE: ICD-10-CM

## 2023-10-28 DIAGNOSIS — H93.12 TINNITUS, LEFT EAR: ICD-10-CM

## 2023-10-28 PROCEDURE — G0463: CPT

## 2023-11-02 RX ORDER — DOCUSATE SODIUM 50 MG/1
50 CAPSULE, LIQUID FILLED ORAL
Qty: 21 | Refills: 0 | Status: ACTIVE | COMMUNITY
Start: 2023-11-02 | End: 1900-01-01

## 2023-11-02 RX ORDER — OXYCODONE AND ACETAMINOPHEN 5; 325 MG/1; MG/1
5-325 TABLET ORAL
Qty: 40 | Refills: 0 | Status: ACTIVE | COMMUNITY
Start: 2023-11-02 | End: 1900-01-01

## 2023-11-03 ENCOUNTER — RESULT REVIEW (OUTPATIENT)
Age: 53
End: 2023-11-03

## 2023-11-03 ENCOUNTER — APPOINTMENT (OUTPATIENT)
Dept: ORTHOPEDIC SURGERY | Facility: HOSPITAL | Age: 53
End: 2023-11-03
Payer: COMMERCIAL

## 2023-11-03 PROCEDURE — 29823 SHO ARTHRS SRG XTNSV DBRDMT: CPT | Mod: RT

## 2023-11-03 PROCEDURE — 29826 SHO ARTHRS SRG DECOMPRESSION: CPT | Mod: RT

## 2023-11-03 PROCEDURE — 29826 SHO ARTHRS SRG DECOMPRESSION: CPT | Mod: AS,RT

## 2023-11-03 PROCEDURE — 29823 SHO ARTHRS SRG XTNSV DBRDMT: CPT | Mod: AS,RT

## 2023-11-03 RX ORDER — OXYCODONE AND ACETAMINOPHEN 5; 325 MG/1; MG/1
5-325 TABLET ORAL
Qty: 30 | Refills: 0 | Status: ACTIVE | COMMUNITY
Start: 2023-11-03 | End: 1900-01-01

## 2023-11-03 RX ORDER — NAPROXEN 500 MG/1
500 TABLET ORAL
Qty: 42 | Refills: 2 | Status: ACTIVE | COMMUNITY
Start: 2023-11-03 | End: 1900-01-01

## 2023-11-14 ENCOUNTER — APPOINTMENT (OUTPATIENT)
Dept: ORTHOPEDIC SURGERY | Facility: CLINIC | Age: 53
End: 2023-11-14
Payer: COMMERCIAL

## 2023-11-14 VITALS — WEIGHT: 315 LBS | BODY MASS INDEX: 46.65 KG/M2 | HEIGHT: 69 IN

## 2023-11-14 DIAGNOSIS — M65.221 CALCIFIC TENDINITIS, RIGHT UPPER ARM: ICD-10-CM

## 2023-11-14 PROCEDURE — 99024 POSTOP FOLLOW-UP VISIT: CPT

## 2023-11-27 ENCOUNTER — APPOINTMENT (OUTPATIENT)
Dept: ORTHOPEDIC SURGERY | Facility: CLINIC | Age: 53
End: 2023-11-27
Payer: COMMERCIAL

## 2023-11-27 VITALS — WEIGHT: 315 LBS | BODY MASS INDEX: 46.65 KG/M2 | HEIGHT: 69 IN

## 2023-11-27 PROCEDURE — 99213 OFFICE O/P EST LOW 20 MIN: CPT | Mod: 24

## 2023-12-05 ENCOUNTER — APPOINTMENT (OUTPATIENT)
Dept: ORTHOPEDIC SURGERY | Facility: CLINIC | Age: 53
End: 2023-12-05
Payer: COMMERCIAL

## 2023-12-05 VITALS — WEIGHT: 315 LBS | HEIGHT: 69 IN | BODY MASS INDEX: 46.65 KG/M2

## 2023-12-05 DIAGNOSIS — M24.812 OTHER SPECIFIC JOINT DERANGEMENTS OF LEFT SHOULDER, NOT ELSEWHERE CLASSIFIED: ICD-10-CM

## 2023-12-05 DIAGNOSIS — M75.52 BURSITIS OF LEFT SHOULDER: ICD-10-CM

## 2023-12-05 DIAGNOSIS — M75.51 BURSITIS OF RIGHT SHOULDER: ICD-10-CM

## 2023-12-05 DIAGNOSIS — M24.811 OTHER SPECIFIC JOINT DERANGEMENTS OF RIGHT SHOULDER, NOT ELSEWHERE CLASSIFIED: ICD-10-CM

## 2023-12-05 PROCEDURE — 99214 OFFICE O/P EST MOD 30 MIN: CPT | Mod: 24

## 2023-12-05 PROCEDURE — L3670: CPT | Mod: LT

## 2023-12-19 ENCOUNTER — APPOINTMENT (OUTPATIENT)
Dept: FAMILY MEDICINE | Facility: HOSPITAL | Age: 53
End: 2023-12-19

## 2023-12-19 ENCOUNTER — MED ADMIN CHARGE (OUTPATIENT)
Age: 53
End: 2023-12-19

## 2023-12-19 ENCOUNTER — OUTPATIENT (OUTPATIENT)
Dept: OUTPATIENT SERVICES | Facility: HOSPITAL | Age: 53
LOS: 1 days | End: 2023-12-19
Payer: COMMERCIAL

## 2023-12-19 VITALS
DIASTOLIC BLOOD PRESSURE: 76 MMHG | HEART RATE: 72 BPM | SYSTOLIC BLOOD PRESSURE: 118 MMHG | WEIGHT: 315 LBS | OXYGEN SATURATION: 97 % | BODY MASS INDEX: 46.65 KG/M2 | RESPIRATION RATE: 17 BRPM | TEMPERATURE: 98 F | HEIGHT: 69 IN

## 2023-12-19 DIAGNOSIS — Z00.00 ENCOUNTER FOR GENERAL ADULT MEDICAL EXAMINATION WITHOUT ABNORMAL FINDINGS: ICD-10-CM

## 2023-12-19 DIAGNOSIS — H92.03 OTALGIA, BILATERAL: ICD-10-CM

## 2023-12-19 DIAGNOSIS — E11.9 TYPE 2 DIABETES MELLITUS W/OUT COMPLICATIONS: ICD-10-CM

## 2023-12-19 DIAGNOSIS — Z98.84 BARIATRIC SURGERY STATUS: ICD-10-CM

## 2023-12-19 DIAGNOSIS — Z12.5 ENCOUNTER FOR SCREENING FOR MALIGNANT NEOPLASM OF PROSTATE: ICD-10-CM

## 2023-12-19 DIAGNOSIS — Z00.00 ENCOUNTER FOR GENERAL ADULT MEDICAL EXAMINATION W/OUT ABNORMAL FINDINGS: ICD-10-CM

## 2023-12-19 DIAGNOSIS — Z86.79 PERSONAL HISTORY OF OTHER DISEASES OF THE CIRCULATORY SYSTEM: ICD-10-CM

## 2023-12-19 DIAGNOSIS — E66.01 MORBID (SEVERE) OBESITY DUE TO EXCESS CALORIES: ICD-10-CM

## 2023-12-19 DIAGNOSIS — E11.9 TYPE 2 DIABETES MELLITUS WITHOUT COMPLICATIONS: ICD-10-CM

## 2023-12-19 DIAGNOSIS — Z23 ENCOUNTER FOR IMMUNIZATION: ICD-10-CM

## 2023-12-19 RX ORDER — HEPATITIS B VACCINE (RECOMBINANT) ADJUVANTED 20 UG/.5ML
20 INJECTION, SOLUTION INTRAMUSCULAR
Qty: 1 | Refills: 0 | Status: ACTIVE | COMMUNITY
Start: 2023-12-19 | End: 1900-01-01

## 2023-12-20 PROBLEM — Z98.84 HISTORY OF GASTRIC BYPASS: Status: ACTIVE | Noted: 2023-12-19

## 2023-12-20 PROBLEM — Z12.5 PROSTATE CANCER SCREENING: Status: RESOLVED | Noted: 2023-03-07 | Resolved: 2023-12-20

## 2023-12-20 PROBLEM — E11.9 DIABETES MELLITUS, TYPE 2: Status: ACTIVE | Noted: 2021-08-11

## 2023-12-20 PROBLEM — H92.03 DISCOMFORT OF BOTH EARS: Status: ACTIVE | Noted: 2023-12-19

## 2023-12-21 LAB
CREAT SPEC-SCNC: 69 MG/DL
MICROALBUMIN 24H UR DL<=1MG/L-MCNC: <1.2 MG/DL
MICROALBUMIN/CREAT 24H UR-RTO: NORMAL MG/G

## 2023-12-23 NOTE — HEALTH RISK ASSESSMENT
[Good] : ~his/her~  mood as  good [Yes] : Yes [2 - 4 times a month (2 pts)] : 2-4 times a month (2 points) [1 or 2 (0 pts)] : 1 or 2 (0 points) [No] : In the past 12 months have you used drugs other than those required for medical reasons? No [No falls in past year] : Patient reported no falls in the past year [0] : 2) Feeling down, depressed, or hopeless: Not at all (0) [Patient reported colonoscopy was normal] : Patient reported colonoscopy was normal [HIV test declined] : HIV test declined [Hepatitis C test declined] : Hepatitis C test declined [With Significant Other] : lives with significant other [Employed] : employed [] :  [Feels Safe at Home] : Feels safe at home [Fully functional (bathing, dressing, toileting, transferring, walking, feeding)] : Fully functional (bathing, dressing, toileting, transferring, walking, feeding) [Fully functional (using the telephone, shopping, preparing meals, housekeeping, doing laundry, using] : Fully functional and needs no help or supervision to perform IADLs (using the telephone, shopping, preparing meals, housekeeping, doing laundry, using transportation, managing medications and managing finances) [Never] : Never [Audit-CScore] : 2 [de-identified] : not much, walk sometimes. [de-identified] : don't eat much s/p surgery, tries to eat healthy. like vegetables. some bread. eats everything. 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

## 2023-12-23 NOTE — PHYSICAL EXAM
[Soft] : abdomen soft [Non Tender] : non-tender [Normal] : affect was normal and insight and judgment were intact [Comprehensive Foot Exam Normal] : Right and left foot were examined and both feet are normal. No ulcers in either foot. Toes are normal and with full ROM.  Normal tactile sensation with monofilament testing throughout both feet [de-identified] : morbid obesity [de-identified] : exam limited by patient physique

## 2023-12-23 NOTE — PLAN
[FreeTextEntry1] : I   #HCM #Morbid obesity, s/p gastric sleeve 2022 UTD FIT 3/2023 Labs from 3/2023 with CBC, wnl. .  CBC wnl on 10/6/23. -Labs from 10/7/23 reveal low immunity to HepB- HepB vaccination today. -Labs for A1c, Vit D, B12, folate  - Encouraged lifestyle modification. Advised at least 30min aerobic exercises/day x5days per week - Encouraged diet consisting of lean meats and vegetables, and avoidance of fatty, fried, refined carbohydrates, and sugary beverages. -Pending surgery for L shoulder as per ortho scheuduling. RTC for pre-OP clearance once scheduled. -RTC in 4 weeks for 2nd dose Hep B vacccination  #DM2 A1c 5.8 on 3/2023. Repeat A1c ordered. -microalbumin ordered  -eye exam: pt state that he saw opthal this year (retina specialist) and had eye exam. Reportedly normal. Advised pt to bring report to clinic at next visit. Pt states understanding. -Advised lifestyle modification as above  #Discomfort of b/l ears pt states constant "cricket sound" at b/l ears x1yr. Declines pain or changes in hearing. -Referral for audiology.  -Advised to not put anything in ears including q-tips.   *Case d/w Dr. Khan
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
## 2023-12-23 NOTE — REVIEW OF SYSTEMS
[Negative] : Psychiatric [Earache] : no earache [Hearing Loss] : no hearing loss [Nasal Discharge] : no nasal discharge [FreeTextEntry4] : Ear "cricket" sound

## 2023-12-30 PROCEDURE — 82607 VITAMIN B-12: CPT

## 2023-12-30 PROCEDURE — 82306 VITAMIN D 25 HYDROXY: CPT

## 2023-12-30 PROCEDURE — 90746 HEPB VACCINE 3 DOSE ADULT IM: CPT

## 2023-12-30 PROCEDURE — 82043 UR ALBUMIN QUANTITATIVE: CPT

## 2023-12-30 PROCEDURE — 83036 HEMOGLOBIN GLYCOSYLATED A1C: CPT

## 2023-12-30 PROCEDURE — 90471 IMMUNIZATION ADMIN: CPT

## 2023-12-30 PROCEDURE — G0463: CPT

## 2024-01-03 DIAGNOSIS — E55.9 VITAMIN D DEFICIENCY, UNSPECIFIED: ICD-10-CM

## 2024-01-03 LAB
25(OH)D3 SERPL-MCNC: 10.1 NG/ML
ESTIMATED AVERAGE GLUCOSE: 114 MG/DL
FOLATE SERPL-MCNC: 6.6 NG/ML
HBA1C MFR BLD HPLC: 5.6 %
VIT B12 SERPL-MCNC: 415 PG/ML

## 2024-01-03 RX ORDER — CHOLECALCIFEROL (VITAMIN D3) 1250 MCG
1.25 MG CAPSULE ORAL
Qty: 4 | Refills: 3 | Status: ACTIVE | COMMUNITY
Start: 2024-01-03 | End: 1900-01-01

## 2024-01-10 ENCOUNTER — OUTPATIENT (OUTPATIENT)
Dept: OUTPATIENT SERVICES | Facility: HOSPITAL | Age: 54
LOS: 1 days | End: 2024-01-10
Payer: COMMERCIAL

## 2024-01-10 ENCOUNTER — APPOINTMENT (OUTPATIENT)
Dept: SLEEP CENTER | Facility: CLINIC | Age: 54
End: 2024-01-10
Payer: COMMERCIAL

## 2024-01-10 DIAGNOSIS — Z98.890 OTHER SPECIFIED POSTPROCEDURAL STATES: Chronic | ICD-10-CM

## 2024-01-10 PROCEDURE — 95811 POLYSOM 6/>YRS CPAP 4/> PARM: CPT

## 2024-01-10 PROCEDURE — 95811 POLYSOM 6/>YRS CPAP 4/> PARM: CPT | Mod: 26

## 2024-01-12 ENCOUNTER — APPOINTMENT (OUTPATIENT)
Dept: ORTHOPEDIC SURGERY | Facility: CLINIC | Age: 54
End: 2024-01-12
Payer: COMMERCIAL

## 2024-01-12 VITALS — HEIGHT: 69 IN | BODY MASS INDEX: 46.65 KG/M2 | WEIGHT: 315 LBS

## 2024-01-12 PROCEDURE — 99214 OFFICE O/P EST MOD 30 MIN: CPT | Mod: 24

## 2024-01-14 NOTE — DATA REVIEWED
[Outside X-rays] : outside x-rays [Lumbar Spine] : lumbar spine [I independently reviewed and interpreted images and report] : I independently reviewed and interpreted images and report [FreeTextEntry1] : I stop paperwork reviewed PT progress notes reviewed Orthopedic progress notes reviewed

## 2024-01-14 NOTE — PHYSICAL EXAM
[Normal Coordination] : normal coordination [Normal DTR UE/LE] : normal DTR UE/LE  [Normal Sensation] : normal sensation [Normal Mood and Affect] : normal mood and affect [Orientated] : orientated [Able to Communicate] : able to communicate [Normal Skin] : normal skin [No Rash] : no rash [No Ulcers] : no ulcers [No Lesions] : no lesions [No obvious lymphadenopathy in areas examined] : no obvious lymphadenopathy in areas examined [Well Developed] : well developed [Peripheral vascular exam is grossly normal] : peripheral vascular exam is grossly normal [No Respiratory Distress] : no respiratory distress [Flexion] : flexion [Extension] : extension [] : negative contralateral straight leg raise
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
## 2024-01-14 NOTE — DISCUSSION/SUMMARY
[de-identified] : Discussed continued exercise-based rehabilitation, continuation of PT.  Discussed if his pain persists, we can consider advanced imaging. Continue to consult shoulder specialist for surgery, he is awaiting clearance. Naprosyn as prescribed F/U in 6 weeks  Prior to appointment and during encounter with patient extensive medical records were reviewed including but not limited to, hospital records, outpatient records, imaging results, and lab data.During this appointment the patient was examined, diagnoses were discussed and explained in a face to face manner. In addition extensive time was spent reviewing aforementioned diagnostic studies. Counseling including abnormal image results, differential diagnoses, treatment options, risk and benefits, lifestyle changes, current condition, and current medications was performed. Patient's comments, questions, and concerns were addressed and patient verbalized understanding. Based on this patient's presentation at our office, which is an orthopedic spine surgeon's office, this patient inherently / intrinsically has a risk, however minute, of developing issues such as Cauda equina syndrome, bowel and bladder changes, or progression of motor or neurological deficits such as paralysis which may be permanent.   Akhil Stone Acting as a Scribe for Akhil Price, attest that this documentation has been prepared under the direction and in the presence of Provider Jai Morales MD.

## 2024-01-17 ENCOUNTER — APPOINTMENT (OUTPATIENT)
Dept: OTOLARYNGOLOGY | Facility: CLINIC | Age: 54
End: 2024-01-17
Payer: COMMERCIAL

## 2024-01-17 ENCOUNTER — APPOINTMENT (OUTPATIENT)
Dept: PULMONOLOGY | Facility: CLINIC | Age: 54
End: 2024-01-17
Payer: COMMERCIAL

## 2024-01-17 PROCEDURE — 99441: CPT

## 2024-01-18 DIAGNOSIS — G47.33 OBSTRUCTIVE SLEEP APNEA (ADULT) (PEDIATRIC): ICD-10-CM

## 2024-01-18 NOTE — REASON FOR VISIT
[Patient] : the patient [Follow-Up] : a follow-up visit [Home] : at home, [unfilled] , at the time of the visit. [Medical Office: ([facility])___] : at the medical office located in  [Verbal consent obtained from patient] : the patient, [unfilled]

## 2024-01-18 NOTE — ASSESSMENT
[FreeTextEntry1] : 53M extensive medical history including obesity with MYA, preDM, and prior COVID infection in 2020 who presents for follow-up pulmonary evaluation over TTM to review and discuss results.  #Obstructive Sleep Apnea - Results of split night study reviewed with patient - Recommendation for CPAP 11 cm H2O based on this study - Will have team place order for machine to allow for further treatment at optimal level and with a machine where we can ensure he is at the desired pressure level and where his data can be monitored - He is aware of the need for a compliance/adherence visit within 3 months post PAP initiation - He is aware of the wait time for receiving machine from [facility] and will call our office if he has not heard anything in 4-6 weeks - He has a follow-up pulmonary appointment with Dr. Mckinley later this month which he will keep and if needed can be referred to a sleep medicine physician for future management of his MYA - All results and plan discussed with patient at length and he is amenable to proceed  #Follow-up with Dr. Mckinley in pulmonary office later this month - All questions answered - This was a TTM visit and patient consented to discussion of results over the phone  The above plan was discussed with SHARON LYONS  in detail. Patient verbalized understanding and agrees with plan as detailed above. Patient was provided education and counselling on current diagnosis/symptoms, diagnostic work up, treatment options and potential side effects of any prescribed therapy/therapies. SHARON  was advised to call our clinic at [phone] for any new or worsening symptoms, or with any questions or concerns. In case of acute onset of respiratory symptoms or worsening presentation, patient was advised to present to nearest emergency room for further evaluation. SHARON  expressed understanding and all questions/concerns were addressed.
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
## 2024-01-20 ENCOUNTER — APPOINTMENT (OUTPATIENT)
Dept: FAMILY MEDICINE | Facility: HOSPITAL | Age: 54
End: 2024-01-20

## 2024-01-20 ENCOUNTER — MED ADMIN CHARGE (OUTPATIENT)
Age: 54
End: 2024-01-20

## 2024-01-20 ENCOUNTER — OUTPATIENT (OUTPATIENT)
Dept: OUTPATIENT SERVICES | Facility: HOSPITAL | Age: 54
LOS: 1 days | End: 2024-01-20
Payer: COMMERCIAL

## 2024-01-20 VITALS
BODY MASS INDEX: 48.44 KG/M2 | RESPIRATION RATE: 17 BRPM | SYSTOLIC BLOOD PRESSURE: 129 MMHG | HEART RATE: 66 BPM | WEIGHT: 315 LBS | TEMPERATURE: 97.9 F | DIASTOLIC BLOOD PRESSURE: 85 MMHG | OXYGEN SATURATION: 97 %

## 2024-01-20 DIAGNOSIS — Z23 ENCOUNTER FOR IMMUNIZATION: ICD-10-CM

## 2024-01-20 DIAGNOSIS — Z98.890 OTHER SPECIFIED POSTPROCEDURAL STATES: Chronic | ICD-10-CM

## 2024-01-20 DIAGNOSIS — Z00.00 ENCOUNTER FOR GENERAL ADULT MEDICAL EXAMINATION WITHOUT ABNORMAL FINDINGS: ICD-10-CM

## 2024-01-20 PROCEDURE — G0463: CPT

## 2024-01-20 NOTE — PHYSICAL EXAM
[No Acute Distress] : no acute distress [Well-Appearing] : well-appearing [Normal Sclera/Conjunctiva] : normal sclera/conjunctiva [EOMI] : extraocular movements intact [PERRL] : pupils equal round and reactive to light [Normal Outer Ear/Nose] : the outer ears and nose were normal in appearance [Normal Oropharynx] : the oropharynx was normal [No Respiratory Distress] : no respiratory distress  [No Accessory Muscle Use] : no accessory muscle use [Clear to Auscultation] : lungs were clear to auscultation bilaterally [Normal Rate] : normal rate  [Regular Rhythm] : with a regular rhythm [Normal S1, S2] : normal S1 and S2 [No Murmur] : no murmur heard [Soft] : abdomen soft [Non Tender] : non-tender [Normal Bowel Sounds] : normal bowel sounds [Grossly Normal Strength/Tone] : grossly normal strength/tone [No Rash] : no rash [Alert and Oriented x3] : oriented to person, place, and time

## 2024-01-25 ENCOUNTER — APPOINTMENT (OUTPATIENT)
Dept: PULMONOLOGY | Facility: CLINIC | Age: 54
End: 2024-01-25
Payer: COMMERCIAL

## 2024-01-25 VITALS
HEART RATE: 83 BPM | SYSTOLIC BLOOD PRESSURE: 122 MMHG | OXYGEN SATURATION: 96 % | BODY MASS INDEX: 46.65 KG/M2 | HEIGHT: 69 IN | WEIGHT: 315 LBS | DIASTOLIC BLOOD PRESSURE: 79 MMHG | TEMPERATURE: 97.8 F | RESPIRATION RATE: 16 BRPM

## 2024-01-25 DIAGNOSIS — G47.33 OBSTRUCTIVE SLEEP APNEA (ADULT) (PEDIATRIC): ICD-10-CM

## 2024-01-25 DIAGNOSIS — J32.9 CHRONIC SINUSITIS, UNSPECIFIED: ICD-10-CM

## 2024-01-25 PROCEDURE — 99213 OFFICE O/P EST LOW 20 MIN: CPT

## 2024-01-25 RX ORDER — FLUTICASONE PROPIONATE 50 UG/1
50 SPRAY, METERED NASAL TWICE DAILY
Qty: 1 | Refills: 3 | Status: ACTIVE | COMMUNITY
Start: 2024-01-25 | End: 1900-01-01

## 2024-01-30 ENCOUNTER — APPOINTMENT (OUTPATIENT)
Dept: ORTHOPEDIC SURGERY | Facility: CLINIC | Age: 54
End: 2024-01-30

## 2024-01-31 NOTE — HISTORY OF PRESENT ILLNESS
[FreeTextEntry1] : hep b vaccination  [de-identified] : 54 yo M with PMHx of T2DM, gout, morbid obesity s/p gastric sleeve 2022, MYA on CPAP presents for second Hep B vaccination. Labs from 10/7/23 reveal low immunity to HepB. Received 1st Hep B vaccination (Heplisav-B) on 12/19/23. Denies any adverse reactions to hep b vaccination.

## 2024-02-02 ENCOUNTER — APPOINTMENT (OUTPATIENT)
Dept: ORTHOPEDIC SURGERY | Facility: HOSPITAL | Age: 54
End: 2024-02-02

## 2024-02-09 NOTE — HISTORY OF PRESENT ILLNESS
[FreeTextEntry1] : obesity f/u  [de-identified] : 52 yo M with PMHx of T2DM, gout, morbid obesity s/p gastric sleeve 2022, MYA on CPAP presents for obesity management. BMI 47.

## 2024-02-10 ENCOUNTER — APPOINTMENT (OUTPATIENT)
Dept: FAMILY MEDICINE | Facility: HOSPITAL | Age: 54
End: 2024-02-10

## 2024-02-13 ENCOUNTER — APPOINTMENT (OUTPATIENT)
Dept: ORTHOPEDIC SURGERY | Facility: CLINIC | Age: 54
End: 2024-02-13

## 2024-02-23 ENCOUNTER — APPOINTMENT (OUTPATIENT)
Dept: ORTHOPEDIC SURGERY | Facility: CLINIC | Age: 54
End: 2024-02-23
Payer: COMMERCIAL

## 2024-02-23 VITALS — BODY MASS INDEX: 46.65 KG/M2 | HEIGHT: 69 IN | WEIGHT: 315 LBS

## 2024-02-23 DIAGNOSIS — M54.16 RADICULOPATHY, LUMBAR REGION: ICD-10-CM

## 2024-02-23 DIAGNOSIS — S39.012D STRAIN OF MUSCLE, FASCIA AND TENDON OF LOWER BACK, SUBSEQUENT ENCOUNTER: ICD-10-CM

## 2024-02-23 PROCEDURE — 99213 OFFICE O/P EST LOW 20 MIN: CPT

## 2024-02-26 NOTE — PHYSICAL EXAM
[Normal Coordination] : normal coordination [Normal DTR UE/LE] : normal DTR UE/LE  [Normal Sensation] : normal sensation [Normal Mood and Affect] : normal mood and affect [Oriented] : oriented [Able to Communicate] : able to communicate [Normal Skin] : normal skin [No Rash] : no rash [No Ulcers] : no ulcers [No Lesions] : no lesions [No obvious lymphadenopathy in areas examined] : no obvious lymphadenopathy in areas examined [Well Developed] : well developed [Peripheral vascular exam is grossly normal] : peripheral vascular exam is grossly normal [No Respiratory Distress] : no respiratory distress [Flexion] : flexion [Extension] : extension [] : negative contralateral straight leg raise

## 2024-02-26 NOTE — DISCUSSION/SUMMARY
[de-identified] : Discussed continued exercise-based rehabilitation, continuation of PT (renewed).  Given his pain has been persistent despite months of PT and nsaid usage, I am requesting a lumbar spine MRI to eval for hnp vs stenosis, patient experiencing persistent low back and rt buttock pain. Possible interventional injections pending mri results. FUV after study is complete.   Prior to appointment and during encounter with patient extensive medical records were reviewed including but not limited to, hospital records, outpatient records, imaging results, and lab data.During this appointment the patient was examined, diagnoses were discussed and explained in a face to face manner. In addition extensive time was spent reviewing aforementioned diagnostic studies. Counseling including abnormal image results, differential diagnoses, treatment options, risk and benefits, lifestyle changes, current condition, and current medications was performed. Patient's comments, questions, and concerns were addressed and patient verbalized understanding. Based on this patient's presentation at our office, which is an orthopedic spine surgeon's office, this patient inherently / intrinsically has a risk, however minute, of developing issues such as Cauda equina syndrome, bowel and bladder changes, or progression of motor or neurological deficits such as paralysis which may be permanent.   GRACE PATTON Acting as a Scribe for Dr. Andrew JEFF, Grace Patton, attest that this documentation has been prepared under the direction and in the presence of Provider Jai Morales MD.
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
## 2024-02-27 ENCOUNTER — APPOINTMENT (OUTPATIENT)
Dept: MRI IMAGING | Facility: CLINIC | Age: 54
End: 2024-02-27
Payer: COMMERCIAL

## 2024-02-27 ENCOUNTER — APPOINTMENT (OUTPATIENT)
Dept: OTOLARYNGOLOGY | Facility: CLINIC | Age: 54
End: 2024-02-27
Payer: COMMERCIAL

## 2024-02-27 VITALS — BODY MASS INDEX: 46.65 KG/M2 | WEIGHT: 315 LBS | HEIGHT: 69 IN | TEMPERATURE: 97.2 F

## 2024-02-27 DIAGNOSIS — H93.13 TINNITUS, BILATERAL: ICD-10-CM

## 2024-02-27 DIAGNOSIS — H90.3 SENSORINEURAL HEARING LOSS, BILATERAL: ICD-10-CM

## 2024-02-27 DIAGNOSIS — H69.93 UNSPECIFIED EUSTACHIAN TUBE DISORDER, BILATERAL: ICD-10-CM

## 2024-02-27 PROCEDURE — 99204 OFFICE O/P NEW MOD 45 MIN: CPT

## 2024-02-27 PROCEDURE — 92557 COMPREHENSIVE HEARING TEST: CPT

## 2024-02-27 PROCEDURE — 92567 TYMPANOMETRY: CPT

## 2024-02-27 PROCEDURE — 72148 MRI LUMBAR SPINE W/O DYE: CPT

## 2024-02-27 RX ORDER — FLUTICASONE PROPIONATE 50 UG/1
50 SPRAY, METERED NASAL DAILY
Qty: 1 | Refills: 3 | Status: ACTIVE | COMMUNITY
Start: 2024-02-27 | End: 1900-01-01

## 2024-02-27 NOTE — HISTORY OF PRESENT ILLNESS
[de-identified] : Hear noise in ear - problem noted about 2 years ago.  Had infection at that time.   Problem was bilat.  Sounds like crickets.   24/7.  No prior ea rproblems  Hx of cerumen in ears.

## 2024-02-27 NOTE — ASSESSMENT
[FreeTextEntry1] : Patietn with bilat tinnitus and bilat snhl - discussed relation of snhl and tinnitus.  also likely ETD and started n flonase -follow up in 6 mos.

## 2024-02-27 NOTE — REVIEW OF SYSTEMS
[Seasonal Allergies] : seasonal allergies [Negative] : Heme/Lymph [de-identified] : bilateral noises sounds like crickets, pt states [place], infections started and since then has had noises in ears

## 2024-03-05 ENCOUNTER — APPOINTMENT (OUTPATIENT)
Dept: ORTHOPEDIC SURGERY | Facility: CLINIC | Age: 54
End: 2024-03-05

## 2024-03-30 NOTE — ED ADULT NURSE NOTE - COVID-19 ORDERING FACILITY
To the ED with c/o upper back pain, patient has been told she has Cervical radiculopathy. Patient has four spinal surgeries. Patient states she has pain when she moves her head/neck.   
[facility]

## 2024-05-06 NOTE — ED PROVIDER NOTE - NSFOLLOWUPINSTRUCTIONS_ED_ALL_ED_FT
diet: DASH/TLC, lifting restrictions 0lb RUE, RUE distal ROM exercises okay, notify if systolic >140 <90, HR >100 <50
Follow with your primary care provider within 48-72 hours.  Rest, no heavy lifting.  Warm compresses to area.  Light walking. Take Motrin 600 mg every 6 hours for pain with food, Valium 5mg every 8 hours as needed for muscle spasm- caution drowsiness/do not drive.   Use lidocaine patches over the counter as directed  Any worsening pain, weakness, numbness, bowel or urinary incontinence return to ER       Strain    A strain is a stretch or tear in one of the muscles in your body. This is caused by an injury to the area such as a twisting mechanism. Symptoms include pain, swelling, or bruising. Rest that area over the next several days and slowly resume activity when tolerated. Ice can help with swelling and pain.     SEEK IMMEDIATE MEDICAL CARE IF YOU HAVE ANY OF THE FOLLOWING SYMPTOMS: worsening pain, inability to move that body part, numbness or tingling.

## 2024-05-15 ENCOUNTER — APPOINTMENT (OUTPATIENT)
Dept: ORTHOPEDIC SURGERY | Facility: CLINIC | Age: 54
End: 2024-05-15
Payer: COMMERCIAL

## 2024-05-15 VITALS — BODY MASS INDEX: 45.1 KG/M2 | WEIGHT: 315 LBS | HEIGHT: 70 IN

## 2024-05-15 DIAGNOSIS — M47.817 SPONDYLOSIS W/OUT MYELOPATHY OR RADICULOPATHY, LUMBOSACRAL REGION: ICD-10-CM

## 2024-05-15 DIAGNOSIS — M51.36 OTHER INTERVERTEBRAL DISC DEGENERATION, LUMBAR REGION: ICD-10-CM

## 2024-05-15 DIAGNOSIS — M48.061 SPINAL STENOSIS, LUMBAR REGION WITHOUT NEUROGENIC CLAUDICATION: ICD-10-CM

## 2024-05-15 DIAGNOSIS — M43.16 SPONDYLOLISTHESIS, LUMBAR REGION: ICD-10-CM

## 2024-05-15 DIAGNOSIS — M51.26 OTHER INTERVERTEBRAL DISC DISPLACEMENT, LUMBAR REGION: ICD-10-CM

## 2024-05-15 DIAGNOSIS — M51.37 OTHER INTERVERTEBRAL DISC DEGENERATION, LUMBOSACRAL REGION: ICD-10-CM

## 2024-05-15 PROCEDURE — 99214 OFFICE O/P EST MOD 30 MIN: CPT

## 2024-05-15 NOTE — ASSESSMENT
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 MRI shows severe stenosis at L4-5 with spondy, G1. I discussed with patient that he may benefit from THAIS at this time. However, if injection does not help, fusion surgery may be indicated for relief.   - Referral to pain management for lumbar THAIS, follow up 2 weeks after injection.   - Continue chiropractic care as it has brought relief in the past and patient still with TTP and muscle spasm   - Recommend NSAIDs PRN - Recommend heating pad use to decrease muscle spasm - Discussed the importance of home exercises, including but not limited to hamstring stretching and core strengthening   Patient was educated on their diagnosis today. All questions answered and patient expressed understanding.  Follow up in 2 months
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
## 2024-05-15 NOTE — PHYSICAL EXAM
[de-identified] : Constitutional: Well groomed and developed.  Respiratory: Normal, unlabored breathing. No use of accessory muscles.  Skin: No rashes or ulcers. Skin warm and dry.  Psychiatric: Oriented to time, place, person and event. No acute distress. Gait: Heel to toe Patient able to walk on toes and heels.    Lumbar spine:  Posture: Normal on coronal and sagittal alignment  AROM:  Flexion 70 Extension 20, with pain Moderate pain with simulated truncal motion   Tenderness:  Thoracic: No tenderness on palpation  Lumbar: Moderate tenderness on palpation     TTP L4-5 Sacrum/coccyx: no tenderness on palpation  Greater trochanteric bursa:  No tenderness  PSIS: None    Motor:                         R             L LE:                                IS                    5             5 Quad              5             5 TA                  5             5 EHL                5             5 Gastroc         5             5                 R  L DTR: Patella  2+  2+ Achilles  2+  2+  Sensory: Light touch sensation intact T12-S1  Babinski's Sign: Negative bilaterally  Straight leg raise test: Negative bilaterally  ZACHERY test: negative bilaterally

## 2024-05-15 NOTE — DATA REVIEWED
[MRI] : MRI [Lumbar Spine] : lumbar spine [I independently reviewed and interpreted images and report] : I independently reviewed and interpreted images and report [FreeTextEntry1] : 2/2024 MRI of L-Spine was reviewed today and is as follows:  Right foraminal HNP L3-4 with right foraminal stenosis Spondylolisthesis L4-5 with severe stenosis

## 2024-06-10 ENCOUNTER — LABORATORY RESULT (OUTPATIENT)
Age: 54
End: 2024-06-10

## 2024-06-10 ENCOUNTER — APPOINTMENT (OUTPATIENT)
Dept: RHEUMATOLOGY | Facility: CLINIC | Age: 54
End: 2024-06-10
Payer: COMMERCIAL

## 2024-06-10 DIAGNOSIS — E66.01 MORBID (SEVERE) OBESITY DUE TO EXCESS CALORIES: ICD-10-CM

## 2024-06-10 DIAGNOSIS — M25.50 PAIN IN UNSPECIFIED JOINT: ICD-10-CM

## 2024-06-10 DIAGNOSIS — M1A.9XX0 CHRONIC GOUT, UNSPECIFIED, W/OUT TOPHUS (TOPHI): ICD-10-CM

## 2024-06-10 PROCEDURE — 99204 OFFICE O/P NEW MOD 45 MIN: CPT
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
## 2024-06-10 NOTE — PHYSICAL EXAM
[TextEntry] : GENERAL: Morbidly obese. Appears in no acute distress  HEENT: EOMI, PERRLA. No conjunctival erythema. Moist mucous membranes. No nasopharyngeal ulcers  NECK: Supple, no cervical lymphadenopathy, no thyromegaly  CARDIOVASCULAR: RRR. S1, S2 auscultated. No murmurs or rubs.  PULMONARY: Clear to auscultation b/l, no wheezes, rales, or crackles  ABDOMINAL: Soft, nontender, nondistended. Bowel sounds present. No organomegaly.  MSK:  No active synovitis, swelling, erythema, or warmth.  No joint tenderness to palpation.  Mild Heberden's nodes b/l hands No deformities.  SKIN: No lesions or rashes  NEURO: No focal deficits. PSYCH: AAOx3. Normal affect and thought process.

## 2024-06-11 LAB
ALBUMIN SERPL ELPH-MCNC: 4 G/DL
ALP BLD-CCNC: 105 U/L
ALT SERPL-CCNC: 18 U/L
ANION GAP SERPL CALC-SCNC: 14 MMOL/L
AST SERPL-CCNC: 21 U/L
BILIRUB SERPL-MCNC: 0.4 MG/DL
BUN SERPL-MCNC: 18 MG/DL
CALCIUM SERPL-MCNC: 9 MG/DL
CHLORIDE SERPL-SCNC: 105 MMOL/L
CO2 SERPL-SCNC: 23 MMOL/L
CREAT SERPL-MCNC: 0.97 MG/DL
CRP SERPL-MCNC: 5 MG/L
EGFR: 94 ML/MIN/1.73M2
ERYTHROCYTE [SEDIMENTATION RATE] IN BLOOD BY WESTERGREN METHOD: 10 MM/HR
GLUCOSE SERPL-MCNC: 96 MG/DL
HCT VFR BLD CALC: 51.2 %
HGB BLD-MCNC: 16.2 G/DL
MCHC RBC-ENTMCNC: 29.4 PG
MCHC RBC-ENTMCNC: 31.6 GM/DL
MCV RBC AUTO: 92.9 FL
PLATELET # BLD AUTO: 370 K/UL
POTASSIUM SERPL-SCNC: 4.8 MMOL/L
PROT SERPL-MCNC: 6.7 G/DL
RBC # BLD: 5.51 M/UL
RBC # FLD: 14.9 %
RHEUMATOID FACT SER QL: <10 IU/ML
SODIUM SERPL-SCNC: 142 MMOL/L
URATE SERPL-MCNC: 8.8 MG/DL
WBC # FLD AUTO: 10.57 K/UL

## 2024-06-13 LAB
CCP AB SER IA-ACNC: <8 UNITS
RF+CCP IGG SER-IMP: NEGATIVE

## 2024-06-21 LAB — 14-3-3 ETA AG SER IA-MCNC: <0.2 NG/ML

## 2024-07-17 ENCOUNTER — APPOINTMENT (OUTPATIENT)
Dept: ORTHOPEDIC SURGERY | Facility: CLINIC | Age: 54
End: 2024-07-17

## 2024-08-27 ENCOUNTER — APPOINTMENT (OUTPATIENT)
Dept: OTOLARYNGOLOGY | Facility: CLINIC | Age: 54
End: 2024-08-27

## 2024-10-21 ENCOUNTER — EMERGENCY (EMERGENCY)
Facility: HOSPITAL | Age: 54
LOS: 1 days | Discharge: LEFT BEFORE DISCHARGE | End: 2024-10-21
Attending: EMERGENCY MEDICINE
Payer: COMMERCIAL

## 2024-10-21 VITALS
RESPIRATION RATE: 18 BRPM | TEMPERATURE: 99 F | OXYGEN SATURATION: 98 % | DIASTOLIC BLOOD PRESSURE: 82 MMHG | HEART RATE: 78 BPM | WEIGHT: 300.05 LBS | SYSTOLIC BLOOD PRESSURE: 131 MMHG | HEIGHT: 69 IN

## 2024-10-21 VITALS
RESPIRATION RATE: 14 BRPM | DIASTOLIC BLOOD PRESSURE: 81 MMHG | OXYGEN SATURATION: 100 % | HEART RATE: 68 BPM | SYSTOLIC BLOOD PRESSURE: 127 MMHG | TEMPERATURE: 98 F

## 2024-10-21 DIAGNOSIS — Z98.890 OTHER SPECIFIED POSTPROCEDURAL STATES: Chronic | ICD-10-CM

## 2024-10-21 LAB
ALBUMIN SERPL ELPH-MCNC: 3.8 G/DL — SIGNIFICANT CHANGE UP (ref 3.3–5.2)
ALP SERPL-CCNC: 102 U/L — SIGNIFICANT CHANGE UP (ref 40–120)
ALT FLD-CCNC: 13 U/L — SIGNIFICANT CHANGE UP
ANION GAP SERPL CALC-SCNC: 10 MMOL/L — SIGNIFICANT CHANGE UP (ref 5–17)
AST SERPL-CCNC: 20 U/L — SIGNIFICANT CHANGE UP
BASOPHILS # BLD AUTO: 0.06 K/UL — SIGNIFICANT CHANGE UP (ref 0–0.2)
BASOPHILS NFR BLD AUTO: 0.8 % — SIGNIFICANT CHANGE UP (ref 0–2)
BILIRUB SERPL-MCNC: 0.3 MG/DL — LOW (ref 0.4–2)
BUN SERPL-MCNC: 13.4 MG/DL — SIGNIFICANT CHANGE UP (ref 8–20)
CALCIUM SERPL-MCNC: 8.9 MG/DL — SIGNIFICANT CHANGE UP (ref 8.4–10.5)
CHLORIDE SERPL-SCNC: 103 MMOL/L — SIGNIFICANT CHANGE UP (ref 96–108)
CO2 SERPL-SCNC: 26 MMOL/L — SIGNIFICANT CHANGE UP (ref 22–29)
CREAT SERPL-MCNC: 0.72 MG/DL — SIGNIFICANT CHANGE UP (ref 0.5–1.3)
EGFR: 109 ML/MIN/1.73M2 — SIGNIFICANT CHANGE UP
EOSINOPHIL # BLD AUTO: 0.36 K/UL — SIGNIFICANT CHANGE UP (ref 0–0.5)
EOSINOPHIL NFR BLD AUTO: 4.6 % — SIGNIFICANT CHANGE UP (ref 0–6)
GLUCOSE SERPL-MCNC: 88 MG/DL — SIGNIFICANT CHANGE UP (ref 70–99)
HCT VFR BLD CALC: 45.1 % — SIGNIFICANT CHANGE UP (ref 39–50)
HGB BLD-MCNC: 14.9 G/DL — SIGNIFICANT CHANGE UP (ref 13–17)
IMM GRANULOCYTES NFR BLD AUTO: 0.3 % — SIGNIFICANT CHANGE UP (ref 0–0.9)
LIDOCAIN IGE QN: 17 U/L — LOW (ref 22–51)
LYMPHOCYTES # BLD AUTO: 1.93 K/UL — SIGNIFICANT CHANGE UP (ref 1–3.3)
LYMPHOCYTES # BLD AUTO: 24.4 % — SIGNIFICANT CHANGE UP (ref 13–44)
MCHC RBC-ENTMCNC: 29.4 PG — SIGNIFICANT CHANGE UP (ref 27–34)
MCHC RBC-ENTMCNC: 33 GM/DL — SIGNIFICANT CHANGE UP (ref 32–36)
MCV RBC AUTO: 89 FL — SIGNIFICANT CHANGE UP (ref 80–100)
MONOCYTES # BLD AUTO: 0.65 K/UL — SIGNIFICANT CHANGE UP (ref 0–0.9)
MONOCYTES NFR BLD AUTO: 8.2 % — SIGNIFICANT CHANGE UP (ref 2–14)
NEUTROPHILS # BLD AUTO: 4.89 K/UL — SIGNIFICANT CHANGE UP (ref 1.8–7.4)
NEUTROPHILS NFR BLD AUTO: 61.7 % — SIGNIFICANT CHANGE UP (ref 43–77)
PLATELET # BLD AUTO: 372 K/UL — SIGNIFICANT CHANGE UP (ref 150–400)
POTASSIUM SERPL-MCNC: 4.7 MMOL/L — SIGNIFICANT CHANGE UP (ref 3.5–5.3)
POTASSIUM SERPL-SCNC: 4.7 MMOL/L — SIGNIFICANT CHANGE UP (ref 3.5–5.3)
PROT SERPL-MCNC: 6.6 G/DL — SIGNIFICANT CHANGE UP (ref 6.6–8.7)
RBC # BLD: 5.07 M/UL — SIGNIFICANT CHANGE UP (ref 4.2–5.8)
RBC # FLD: 13.1 % — SIGNIFICANT CHANGE UP (ref 10.3–14.5)
SODIUM SERPL-SCNC: 139 MMOL/L — SIGNIFICANT CHANGE UP (ref 135–145)
WBC # BLD: 7.91 K/UL — SIGNIFICANT CHANGE UP (ref 3.8–10.5)
WBC # FLD AUTO: 7.91 K/UL — SIGNIFICANT CHANGE UP (ref 3.8–10.5)

## 2024-10-21 PROCEDURE — 99285 EMERGENCY DEPT VISIT HI MDM: CPT

## 2024-10-21 RX ORDER — FAMOTIDINE 40 MG
20 TABLET ORAL ONCE
Refills: 0 | Status: COMPLETED | OUTPATIENT
Start: 2024-10-21 | End: 2024-10-21

## 2024-10-21 RX ORDER — SODIUM CHLORIDE 0.9 % (FLUSH) 0.9 %
1000 SYRINGE (ML) INJECTION ONCE
Refills: 0 | Status: COMPLETED | OUTPATIENT
Start: 2024-10-21 | End: 2024-10-21

## 2024-10-21 RX ORDER — MAG HYDROX/ALUMINUM HYD/SIMETH 200-200-20
30 SUSPENSION, ORAL (FINAL DOSE FORM) ORAL ONCE
Refills: 0 | Status: COMPLETED | OUTPATIENT
Start: 2024-10-21 | End: 2024-10-21

## 2024-10-21 RX ADMIN — Medication 30 MILLILITER(S): at 20:37

## 2024-10-21 RX ADMIN — Medication 1000 MILLILITER(S): at 20:17

## 2024-10-21 RX ADMIN — Medication 20 MILLIGRAM(S): at 20:18

## 2024-10-21 NOTE — ED ADULT NURSE NOTE - CHIEF COMPLAINT QUOTE
pt c/o of L sided edema under rib cage. pt states this has been happening for a few weeks. pt denies cp or sob. admits to mild dizziness. denies heavy lifting or trauma hx of gastric sleeve 2 years ago

## 2024-10-21 NOTE — ED PROVIDER NOTE - PHYSICAL EXAMINATION
Gen: AAOx3, NAD, obese  HEENT: Normocephalic atraumatic. EOMI. No scleral icterus. Moist mucus membranes.  CV: RRR. Audible S1 and S2. No murmurs. 2+ radial and PT pulses   Pulm: Clear to auscultation bilaterally. No wheezes, rales, or rhonchi. No accessory muscle use or respiratory distress.  Abdomen: soft, normoactive BS, non distended, +LUQ ttp, no rebound, no guarding  Musculoskeletal:  Moving all extremities equally. No gross deformity. No tenderness to palpation.  Skin: No rashes or lesions. Warm.  Neurologic: No focal neurological deficits. CN II-XII grossly intact.  : no CVA tenderness  Psych: Appropriate mood and affect. Cooperative.
[email or web address]

## 2024-10-21 NOTE — ED ADULT NURSE NOTE - PAIN RATING/NUMBER SCALE (0-10): ACTIVITY
Health Maintenance Due   Topic Date Due    COVID-19 Vaccine (1) Never done    Abdominal Aortic Aneurysm (AAA) Screening  Never done    Influenza Vaccine (1) 09/01/2023    Traditional Medicare- Medicare Wellness Visit  12/07/2023    Depression Screening  12/07/2023    Colorectal Cancer Screen-  12/12/2023       Patient is due for topics as listed above but is not proceeding with Immunization(s) COVID-19 and Influenza, Abdominal Aortic Aneurysm (AAA) screening, and Colorectal Cancer Screening: Colonoscopy at this time. Education provided for Immunization(s) COVID-19 and Influenza, Abdominal Aortic Aneurysm (AAA) screening, and Colorectal Cancer Screening: Colonoscopy.   3 (mild pain)

## 2024-10-21 NOTE — ED PROVIDER NOTE - ATTENDING CONTRIBUTION TO CARE
Erlinda: I performed a face to face evaluation of this patient and performed a full history and physical examination on the patient.  I agree with the resident's history, physical examination, and plan of the patient unless otherwise noted. My brief assessment is as follows: hx gastric sleeve 2 years ago, family hx pancreatic cancer p/w weeks of luq discomfort/sensation of swelling to area with intermittent constipation. takes some mirlax with some relief. no vomiting, nof /c. no urinary symptoms. non toxic, nad, ctab, rrr, obese abd with mild luq ttp, exam somewhat limited by habitus. neuro intact. labs, ct, symptom control, reassess

## 2024-10-21 NOTE — ED PROVIDER NOTE - PATIENT PORTAL LINK FT
You can access the FollowMyHealth Patient Portal offered by [facility] by registering at the following website: [email or web address]. By joining [facility]’s FollowMyHealth portal, you will also be able to view your health information using other applications (apps) compatible with our system.

## 2024-10-21 NOTE — ED ADULT NURSE NOTE - NSFALLUNIVINTERV_ED_ALL_ED
Bed/Stretcher in lowest position, wheels locked, appropriate side rails in place/Call bell, personal items and telephone in reach/Instruct patient to call for assistance before getting out of bed/chair/stretcher/Non-slip footwear applied when patient is off stretcher/[place] to call system/Physically safe environment - no spills, clutter or unnecessary equipment/Purposeful proactive rounding/Room/bathroom lighting operational, light cord in reach

## 2024-10-21 NOTE — ED PROVIDER NOTE - CLINICAL SUMMARY MEDICAL DECISION MAKING FREE TEXT BOX
Patient is a 53yo M with PSHx of gastric sleeve (2 yrs ago) who presents to the ED complaining of LUQ burning abdominal pain and swelling for weeks. Will check labs, treat symptomatically, and get CT abd/pel Patient is a 53yo M with PSHx of gastric sleeve (2 yrs ago) who presents to the ED complaining of LUQ burning abdominal pain and swelling for weeks. Will check labs, treat symptomatically, and get CT abd/pel  -Labs grossly unremarkable   -CT revealing no acute findings, CT incidentals: Non-obstructing bilateral intrarenal calculi measuring up to 4 mm on the right and 5 mm on the left, Prostate gland is upper limits of normal in size, Post gastric sleeve surgery. No bowel obstruction. Appendix is normal, Mild atherosclerotic calcifications, Multilevel degenerative changes of the spine, Minimal bibasilar atelectasis, Mild hepatomegaly and hepatic steatosis.  All results given. As per protocol, bariatrics consulted for hx gastric sleeve. Patient requesting to leave ED prior to bariatric consult. Stating he has an appt tomorrow with bariatric outpatient   I had a lengthy discussion with patient, and the patient wishes to leave at this time. The patient understands that he is leaving against medical advice despite the risk of missing a potential serious diagnosis which may lead to injury, disability and/or death. I discussed with the patient which tests would need to be performed and what type of monitoring would be necessary for the patient as well. I was unable to convince the patient to stay for further work-up.The patient is alert and oriented and demonstrates competence in making medical decisions. LOYD paperwork filled out

## 2024-10-21 NOTE — ED PROVIDER NOTE - PROGRESS NOTE DETAILS
Signed out from Day Provider, pending CT CT results returned and given to patient Patient requesting to leave from the ED prior to bariatric evaluation.

## 2024-10-21 NOTE — ED ADULT TRIAGE NOTE - CHIEF COMPLAINT QUOTE
pt c/o of L sided edema under rib cage. pt states this has been happening for a few weeks. pt denies cp or sob. admits ot mild dizziness. denies heavy lifting or trama. hx of gastric sleeve 2 years ago pt c/o of L sided edema under rib cage. pt states this has been happening for a few weeks. pt denies cp or sob. admits to mild dizziness. denies heavy lifting or trauma hx of gastric sleeve 2 years ago

## 2024-10-21 NOTE — ED PROVIDER NOTE - OBJECTIVE STATEMENT
Patient is a 55yo M with PSHx of gastric sleeve (2 yrs ago) who presents to the ED complaining of LUQ burning abdominal pain and swelling for weeks. + Associated constipation, has been taking stool softeners, metamucil, and miralax with partial success. Patient states he has significant family history of pancreatic cancer and wants to make sure he doesn't have cancer. Denies nausea, vomiting, diarrhea, fevers.

## 2024-10-21 NOTE — ED ADULT NURSE NOTE - OBJECTIVE STATEMENT
pt A & Ox4 c/o LUQ pain and swelling x weeks. Respirations even and unlabored. Denies chest pain or SOB. Pt reports LUQ burning associated w/ constipation. Has taken OTC medications w/ some relief. Denies N/V/D. Hx of gastric sleeve. IV established. Blood specimens sent to lab. Medications administered as ordered.

## 2024-10-22 PROCEDURE — 36415 COLL VENOUS BLD VENIPUNCTURE: CPT

## 2024-10-22 PROCEDURE — 74177 CT ABD & PELVIS W/CONTRAST: CPT | Mod: 26,MC

## 2024-10-22 PROCEDURE — 99284 EMERGENCY DEPT VISIT MOD MDM: CPT | Mod: 25

## 2024-10-22 PROCEDURE — 85025 COMPLETE CBC W/AUTO DIFF WBC: CPT

## 2024-10-22 PROCEDURE — 80053 COMPREHEN METABOLIC PANEL: CPT

## 2024-10-22 PROCEDURE — 74177 CT ABD & PELVIS W/CONTRAST: CPT | Mod: MC

## 2024-10-22 PROCEDURE — 96374 THER/PROPH/DIAG INJ IV PUSH: CPT

## 2024-10-22 PROCEDURE — 83690 ASSAY OF LIPASE: CPT

## 2024-10-30 ENCOUNTER — OUTPATIENT (OUTPATIENT)
Dept: OUTPATIENT SERVICES | Facility: HOSPITAL | Age: 54
LOS: 1 days | End: 2024-10-30
Payer: COMMERCIAL

## 2024-10-30 ENCOUNTER — APPOINTMENT (OUTPATIENT)
Dept: FAMILY MEDICINE | Facility: HOSPITAL | Age: 54
End: 2024-10-30

## 2024-10-30 VITALS
RESPIRATION RATE: 17 BRPM | DIASTOLIC BLOOD PRESSURE: 81 MMHG | BODY MASS INDEX: 48.21 KG/M2 | SYSTOLIC BLOOD PRESSURE: 130 MMHG | HEART RATE: 83 BPM | WEIGHT: 315 LBS | TEMPERATURE: 98 F | OXYGEN SATURATION: 100 %

## 2024-10-30 DIAGNOSIS — B35.4 TINEA CORPORIS: ICD-10-CM

## 2024-10-30 DIAGNOSIS — Z98.890 OTHER SPECIFIED POSTPROCEDURAL STATES: Chronic | ICD-10-CM

## 2024-10-30 DIAGNOSIS — N20.0 CALCULUS OF KIDNEY: ICD-10-CM

## 2024-10-30 DIAGNOSIS — Z00.00 ENCOUNTER FOR GENERAL ADULT MEDICAL EXAMINATION WITHOUT ABNORMAL FINDINGS: ICD-10-CM

## 2024-10-30 DIAGNOSIS — E66.01 MORBID (SEVERE) OBESITY DUE TO EXCESS CALORIES: ICD-10-CM

## 2024-10-30 PROCEDURE — G0463: CPT

## 2024-10-30 RX ORDER — TAMSULOSIN HYDROCHLORIDE 0.4 MG/1
0.4 CAPSULE ORAL
Qty: 30 | Refills: 2 | Status: ACTIVE | COMMUNITY
Start: 2024-10-30 | End: 1900-01-01

## 2024-10-31 DIAGNOSIS — E66.01 MORBID (SEVERE) OBESITY DUE TO EXCESS CALORIES: ICD-10-CM

## 2024-10-31 DIAGNOSIS — N20.0 CALCULUS OF KIDNEY: ICD-10-CM

## 2024-11-01 ENCOUNTER — OUTPATIENT (OUTPATIENT)
Dept: OUTPATIENT SERVICES | Facility: HOSPITAL | Age: 54
LOS: 1 days | Discharge: HOME | End: 2024-11-01
Payer: COMMERCIAL

## 2024-11-01 VITALS
TEMPERATURE: 97 F | SYSTOLIC BLOOD PRESSURE: 112 MMHG | RESPIRATION RATE: 14 BRPM | HEART RATE: 63 BPM | DIASTOLIC BLOOD PRESSURE: 61 MMHG | OXYGEN SATURATION: 96 % | HEIGHT: 70 IN | WEIGHT: 315 LBS

## 2024-11-01 DIAGNOSIS — E66.01 MORBID (SEVERE) OBESITY DUE TO EXCESS CALORIES: ICD-10-CM

## 2024-11-01 DIAGNOSIS — Z98.890 OTHER SPECIFIED POSTPROCEDURAL STATES: Chronic | ICD-10-CM

## 2024-11-01 DIAGNOSIS — K21.9 GASTRO-ESOPHAGEAL REFLUX DISEASE WITHOUT ESOPHAGITIS: ICD-10-CM

## 2024-11-01 PROBLEM — B35.4 TINEA CORPORIS: Status: ACTIVE | Noted: 2024-11-01

## 2024-11-01 PROCEDURE — 88312 SPECIAL STAINS GROUP 1: CPT

## 2024-11-01 PROCEDURE — 88313 SPECIAL STAINS GROUP 2: CPT | Mod: 26

## 2024-11-01 PROCEDURE — 88305 TISSUE EXAM BY PATHOLOGIST: CPT

## 2024-11-01 PROCEDURE — 88312 SPECIAL STAINS GROUP 1: CPT | Mod: 26

## 2024-11-01 PROCEDURE — 88313 SPECIAL STAINS GROUP 2: CPT

## 2024-11-01 PROCEDURE — 88305 TISSUE EXAM BY PATHOLOGIST: CPT | Mod: 26

## 2024-11-01 RX ORDER — SEMAGLUTIDE 0.25 MG/.5ML
0.25 INJECTION, SOLUTION SUBCUTANEOUS
Qty: 1 | Refills: 0 | Status: ACTIVE | COMMUNITY
Start: 2024-11-01 | End: 1900-01-01

## 2024-11-01 RX ORDER — B-COMPLEX WITH VITAMIN C
0 VIAL (ML) INJECTION
Refills: 0 | DISCHARGE

## 2024-11-01 NOTE — ASU PREOP CHECKLIST - BMI (KG/M2)
47.4

## 2024-11-04 LAB — SURGICAL PATHOLOGY STUDY: SIGNIFICANT CHANGE UP

## 2024-11-08 DIAGNOSIS — Z98.84 BARIATRIC SURGERY STATUS: ICD-10-CM

## 2024-11-08 DIAGNOSIS — K21.9 GASTRO-ESOPHAGEAL REFLUX DISEASE WITHOUT ESOPHAGITIS: ICD-10-CM

## 2024-11-08 DIAGNOSIS — K44.9 DIAPHRAGMATIC HERNIA WITHOUT OBSTRUCTION OR GANGRENE: ICD-10-CM

## 2024-11-08 DIAGNOSIS — G47.33 OBSTRUCTIVE SLEEP APNEA (ADULT) (PEDIATRIC): ICD-10-CM

## 2024-12-14 ENCOUNTER — APPOINTMENT (OUTPATIENT)
Dept: FAMILY MEDICINE | Facility: HOSPITAL | Age: 54
End: 2024-12-14

## 2025-01-02 ENCOUNTER — NON-APPOINTMENT (OUTPATIENT)
Age: 55
End: 2025-01-02

## 2025-01-08 ENCOUNTER — APPOINTMENT (OUTPATIENT)
Dept: GASTROENTEROLOGY | Facility: CLINIC | Age: 55
End: 2025-01-08

## 2025-01-09 ENCOUNTER — APPOINTMENT (OUTPATIENT)
Dept: CARDIOLOGY | Facility: CLINIC | Age: 55
End: 2025-01-09
Payer: COMMERCIAL

## 2025-01-09 ENCOUNTER — NON-APPOINTMENT (OUTPATIENT)
Age: 55
End: 2025-01-09

## 2025-01-09 VITALS
DIASTOLIC BLOOD PRESSURE: 58 MMHG | OXYGEN SATURATION: 96 % | BODY MASS INDEX: 45.1 KG/M2 | SYSTOLIC BLOOD PRESSURE: 126 MMHG | WEIGHT: 315 LBS | HEIGHT: 70 IN | HEART RATE: 88 BPM

## 2025-01-09 VITALS — DIASTOLIC BLOOD PRESSURE: 68 MMHG | SYSTOLIC BLOOD PRESSURE: 110 MMHG

## 2025-01-09 DIAGNOSIS — E78.5 HYPERLIPIDEMIA, UNSPECIFIED: ICD-10-CM

## 2025-01-09 DIAGNOSIS — E66.01 MORBID (SEVERE) OBESITY DUE TO EXCESS CALORIES: ICD-10-CM

## 2025-01-09 PROCEDURE — G2211 COMPLEX E/M VISIT ADD ON: CPT

## 2025-01-09 PROCEDURE — 99215 OFFICE O/P EST HI 40 MIN: CPT

## 2025-01-09 PROCEDURE — 93000 ELECTROCARDIOGRAM COMPLETE: CPT

## 2025-01-14 ENCOUNTER — APPOINTMENT (OUTPATIENT)
Dept: PULMONOLOGY | Facility: CLINIC | Age: 55
End: 2025-01-14
Payer: COMMERCIAL

## 2025-01-14 DIAGNOSIS — Z01.818 ENCOUNTER FOR OTHER PREPROCEDURAL EXAMINATION: ICD-10-CM

## 2025-01-14 DIAGNOSIS — G47.33 OBSTRUCTIVE SLEEP APNEA (ADULT) (PEDIATRIC): ICD-10-CM

## 2025-01-14 PROCEDURE — ZZZZZ: CPT

## 2025-01-14 PROCEDURE — 94010 BREATHING CAPACITY TEST: CPT

## 2025-01-14 PROCEDURE — 94729 DIFFUSING CAPACITY: CPT

## 2025-01-14 PROCEDURE — 99214 OFFICE O/P EST MOD 30 MIN: CPT | Mod: 25

## 2025-01-14 PROCEDURE — 94726 PLETHYSMOGRAPHY LUNG VOLUMES: CPT

## 2025-01-16 ENCOUNTER — APPOINTMENT (OUTPATIENT)
Dept: CARDIOLOGY | Facility: CLINIC | Age: 55
End: 2025-01-16
Payer: COMMERCIAL

## 2025-01-16 DIAGNOSIS — R00.1 ENCOUNTER FOR PREPROCEDURAL CARDIOVASCULAR EXAMINATION: ICD-10-CM

## 2025-01-16 DIAGNOSIS — Z01.810 ENCOUNTER FOR PREPROCEDURAL CARDIOVASCULAR EXAMINATION: ICD-10-CM

## 2025-01-16 PROCEDURE — 93015 CV STRESS TEST SUPVJ I&R: CPT

## 2025-01-22 ENCOUNTER — OUTPATIENT (OUTPATIENT)
Dept: OUTPATIENT SERVICES | Facility: HOSPITAL | Age: 55
LOS: 1 days | End: 2025-01-22
Payer: COMMERCIAL

## 2025-01-22 VITALS
WEIGHT: 315 LBS | HEIGHT: 69 IN | OXYGEN SATURATION: 98 % | RESPIRATION RATE: 16 BRPM | SYSTOLIC BLOOD PRESSURE: 127 MMHG | DIASTOLIC BLOOD PRESSURE: 68 MMHG | HEART RATE: 63 BPM | TEMPERATURE: 98 F

## 2025-01-22 DIAGNOSIS — Z98.890 OTHER SPECIFIED POSTPROCEDURAL STATES: Chronic | ICD-10-CM

## 2025-01-22 DIAGNOSIS — Z01.818 ENCOUNTER FOR OTHER PREPROCEDURAL EXAMINATION: ICD-10-CM

## 2025-01-22 DIAGNOSIS — Z90.3 ACQUIRED ABSENCE OF STOMACH [PART OF]: Chronic | ICD-10-CM

## 2025-01-22 DIAGNOSIS — E66.9 OBESITY, UNSPECIFIED: ICD-10-CM

## 2025-01-22 LAB
A1C WITH ESTIMATED AVERAGE GLUCOSE RESULT: 5.6 % — SIGNIFICANT CHANGE UP (ref 4–5.6)
ABO RH CONFIRMATION: SIGNIFICANT CHANGE UP
ALBUMIN SERPL ELPH-MCNC: 3.6 G/DL — SIGNIFICANT CHANGE UP (ref 3.3–5)
ALP SERPL-CCNC: 110 U/L — SIGNIFICANT CHANGE UP (ref 40–120)
ALT FLD-CCNC: 23 U/L — SIGNIFICANT CHANGE UP (ref 12–78)
ANION GAP SERPL CALC-SCNC: 4 MMOL/L — LOW (ref 5–17)
APTT BLD: 36.2 SEC — HIGH (ref 24.5–35.6)
AST SERPL-CCNC: 19 U/L — SIGNIFICANT CHANGE UP (ref 15–37)
BASOPHILS # BLD AUTO: 0.06 K/UL — SIGNIFICANT CHANGE UP (ref 0–0.2)
BASOPHILS NFR BLD AUTO: 0.8 % — SIGNIFICANT CHANGE UP (ref 0–2)
BILIRUB SERPL-MCNC: 0.3 MG/DL — SIGNIFICANT CHANGE UP (ref 0.2–1.2)
BLOOD GAS SOURCE: SIGNIFICANT CHANGE UP
BUN SERPL-MCNC: 12 MG/DL — SIGNIFICANT CHANGE UP (ref 7–23)
CALCIUM SERPL-MCNC: 9.2 MG/DL — SIGNIFICANT CHANGE UP (ref 8.5–10.1)
CHLORIDE SERPL-SCNC: 107 MMOL/L — SIGNIFICANT CHANGE UP (ref 96–108)
CO2 SERPL-SCNC: 27 MMOL/L — SIGNIFICANT CHANGE UP (ref 22–31)
COHGB MFR BLDV: 1.1 % — SIGNIFICANT CHANGE UP
CREAT SERPL-MCNC: 0.93 MG/DL — SIGNIFICANT CHANGE UP (ref 0.5–1.3)
EGFR: 98 ML/MIN/1.73M2 — SIGNIFICANT CHANGE UP
EOSINOPHIL # BLD AUTO: 0.18 K/UL — SIGNIFICANT CHANGE UP (ref 0–0.5)
EOSINOPHIL NFR BLD AUTO: 2.3 % — SIGNIFICANT CHANGE UP (ref 0–6)
ESTIMATED AVERAGE GLUCOSE: 114 MG/DL — SIGNIFICANT CHANGE UP (ref 68–114)
GLUCOSE SERPL-MCNC: 114 MG/DL — HIGH (ref 70–99)
HCT VFR BLD CALC: 48 % — SIGNIFICANT CHANGE UP (ref 39–50)
HGB BLD-MCNC: 16.1 G/DL — SIGNIFICANT CHANGE UP (ref 13–17)
IMM GRANULOCYTES NFR BLD AUTO: 0.3 % — SIGNIFICANT CHANGE UP (ref 0–0.9)
INR BLD: 1 RATIO — SIGNIFICANT CHANGE UP (ref 0.85–1.16)
LYMPHOCYTES # BLD AUTO: 1.55 K/UL — SIGNIFICANT CHANGE UP (ref 1–3.3)
LYMPHOCYTES # BLD AUTO: 20.2 % — SIGNIFICANT CHANGE UP (ref 13–44)
MCHC RBC-ENTMCNC: 29.7 PG — SIGNIFICANT CHANGE UP (ref 27–34)
MCHC RBC-ENTMCNC: 33.5 G/DL — SIGNIFICANT CHANGE UP (ref 32–36)
MCV RBC AUTO: 88.6 FL — SIGNIFICANT CHANGE UP (ref 80–100)
MONOCYTES # BLD AUTO: 0.52 K/UL — SIGNIFICANT CHANGE UP (ref 0–0.9)
MONOCYTES NFR BLD AUTO: 6.8 % — SIGNIFICANT CHANGE UP (ref 2–14)
NEUTROPHILS # BLD AUTO: 5.33 K/UL — SIGNIFICANT CHANGE UP (ref 1.8–7.4)
NEUTROPHILS NFR BLD AUTO: 69.6 % — SIGNIFICANT CHANGE UP (ref 43–77)
PLATELET # BLD AUTO: 406 K/UL — HIGH (ref 150–400)
POTASSIUM SERPL-MCNC: 3.9 MMOL/L — SIGNIFICANT CHANGE UP (ref 3.5–5.3)
POTASSIUM SERPL-SCNC: 3.9 MMOL/L — SIGNIFICANT CHANGE UP (ref 3.5–5.3)
PROT SERPL-MCNC: 7.3 GM/DL — SIGNIFICANT CHANGE UP (ref 6–8.3)
PROTHROM AB SERPL-ACNC: 11.5 SEC — SIGNIFICANT CHANGE UP (ref 9.9–13.4)
RBC # BLD: 5.42 M/UL — SIGNIFICANT CHANGE UP (ref 4.2–5.8)
RBC # FLD: 13.4 % — SIGNIFICANT CHANGE UP (ref 10.3–14.5)
SODIUM SERPL-SCNC: 138 MMOL/L — SIGNIFICANT CHANGE UP (ref 135–145)
WBC # BLD: 7.66 K/UL — SIGNIFICANT CHANGE UP (ref 3.8–10.5)
WBC # FLD AUTO: 7.66 K/UL — SIGNIFICANT CHANGE UP (ref 3.8–10.5)

## 2025-01-22 PROCEDURE — 93010 ELECTROCARDIOGRAM REPORT: CPT

## 2025-01-22 PROCEDURE — 86901 BLOOD TYPING SEROLOGIC RH(D): CPT

## 2025-01-22 PROCEDURE — 99214 OFFICE O/P EST MOD 30 MIN: CPT | Mod: 25

## 2025-01-22 PROCEDURE — 86850 RBC ANTIBODY SCREEN: CPT

## 2025-01-22 PROCEDURE — 85610 PROTHROMBIN TIME: CPT

## 2025-01-22 PROCEDURE — 82375 ASSAY CARBOXYHB QUANT: CPT

## 2025-01-22 PROCEDURE — 86900 BLOOD TYPING SEROLOGIC ABO: CPT

## 2025-01-22 PROCEDURE — 80053 COMPREHEN METABOLIC PANEL: CPT

## 2025-01-22 PROCEDURE — 85730 THROMBOPLASTIN TIME PARTIAL: CPT

## 2025-01-22 PROCEDURE — 93005 ELECTROCARDIOGRAM TRACING: CPT

## 2025-01-22 PROCEDURE — 85025 COMPLETE CBC W/AUTO DIFF WBC: CPT

## 2025-01-22 PROCEDURE — 36415 COLL VENOUS BLD VENIPUNCTURE: CPT

## 2025-01-22 PROCEDURE — 83036 HEMOGLOBIN GLYCOSYLATED A1C: CPT

## 2025-01-22 NOTE — H&P PST ADULT - HISTORY OF PRESENT ILLNESS
53 y/o male presents to [facility] for scheduled laparoscopic single anastomosis duodenal intentional bypass on 2/10/25.  Patient with hx of gastric sleeve 2022 initial weight loss of  80lbs within 1 st year. Seen by surgeon for rapid weight gain of 30 lbs over the last year, endoscopy done and procedure advised for weight management.

## 2025-01-22 NOTE — H&P PST ADULT - NSICDXFAMILYHX_GEN_ALL_CORE_FT
FAMILY HISTORY:  Father  Still living? Unknown  Family history of DVT, Age at diagnosis: Age Unknown    Grandparent  Still living? Unknown  FH: pancreatic cancer, Age at diagnosis: Age Unknown  FHx: prostate cancer, Age at diagnosis: Age Unknown

## 2025-01-22 NOTE — H&P PST ADULT - ASSESSMENT
53 y/o male presents to [facility] for scheduled laparoscopic single anastomosis duodenal intentional bypass on 2/10/25.    CAPRINI SCORE Version 2013    AGE RELATED RISK FACTORS                                                             [ x] Age 41-60 years             [1 point]  [ ] Age: 61-74 years            [2 points]                 [ ] Age 75 years or over     [3 points]             DISEASE RELATED RISK FACTORS                                                       [ ] Current swollen legs (pitting edema of any level)     [1 point]                     [ ] Varicose veins (visible, bulging vein, not spider veins or surgically removed veins)   [1 point]                                 [x ] BMI > 25 Kg/m2                       [1 point]  [ x] BMI > 40 kg/m2                       [1 point]                                 [ ] Serious infection (within past month, requiring hospitalization and IV antibiotics)    [1 point]                     [ ] Lung disease ( interstitial: COPD, emphysema, sarcoid, 9-11 illness, NOT asthma)       [1 point]                                                                          [ ] Acute myocardial infarction (within past month)      [1 point]                  [ ] Congestive heart failure (episode within past month or taking CHF meds)                   [1 point]         [ ] Inflammatory bowel disease (Crohns disease or ulcerative colitis, not irritable bowel syndrome)   [1 point]                  [ ] Central venous access, PICC line or Port (within past month)     [2 points]                                                             [ ] Stroke (within past month)     [5 points]    [ ] Previous or present malignancy (includes melanoma but not basal cell carcinoma, each incidence of cancer scores 2 points-not metastases)  [2 points]                                                                                                                                                         HEMATOLOGY RELATED FACTORS                                                         [ ] History of DVT or PE (including superficial venous thrombosis)    [3 points]                    [ x] Positive family history for DVT or PE (includes first-, second-, and third-degree relatives)   [3 points]   [ ] Personal or family history of genetic thrombophilia (family history counts only if it has not been confirmed that patient does not have this genetic marker)                       [ ] Prothrombin 69610D mutation                   [3 points]                           [ ] Factor V Leiden                                               [3 points]            [ ] Antithrombin III deficiency                           [3 points]         [ ] Protein C & S deficiency                                [3 points]              [ ] Dysfibrinogenemia                                         [3 points]  [ ] Personal history of acquired thrombophilia                       [ ] Lupus anticoagulant                                       [3 points]                                                                  [ ] Anticardiolipin antibodies                             [3 points]              [ ] Antiphospholipid antibodies                         [3 points]                                                         [ ] High homocysteine in the blood                  [3 points]                                                    [ ] Myeloproliferative disorders (including thrombocytosis)    [3 points]            [ ] HIV                                                                     [3 points]                                                    [ ] Heparin induced thrombocytopenia            [3 points]                                        MOBILITY RELATED FACTORS  [ ] Bed rest or restricted mobility (inability to ambulate 30 feet continuously or removable leg brace) for less than 72 hours    [1 point]  [ ] Nonremovable plaster cast or mold that prevents calf muscle use   [2 points]  [ ] Bed bound  or restricted mobility for more than 72 hours                  [2 points]    GENDER SPECIFIC FACTORS  [ ] Pregnancy or had a baby within the last month   [1 point]  [ ] Hormone therapy  or oral contraception               [1 point]  [ ] Current use of estrogen-like drugs (raloxifine, tamoxifen, anastrozole, letrozole)                                [1 point]  [ ] History of unexplained stillborn infant, premature birth with toxemia or growth-restricted infant   [1 point]  [ ] Recurrent spontaneous abortions (3 or more)      [1 point]    OTHER RISK FACTORS                                         [ ] Current smoker (includes vaping and smoking marijuana)      [1 point]  [ ] Diabetes requiring insulin     [1 point]                   [ ] Chemotherapy (includes methotrexate for rheumatoid arthritis, hydroxyurea for thrombocytosis)    [1 point]  [ ] Blood transfusion(s)               [1 point]    SURGERY RELATED RISK FACTORS  [ ]  section within the last month                    [1 point]  [ ] Minor surgery is planned (less than 45 minutes)     [1 point]  [ ] Past major surgery (longer than 45 minutes) within past month  [2 points]  [x ] Planned major surgery lasting more than 45 minutes (includes laparoscopic and arthroscopic; do not add to the "5" for hip and knee replacement)    [2 points]  [x ] Length of surgery over 2 hours (includes anesthesia time; do not add to the "5" for hip and knee replacement)   [1 point]  [ ] Elective hip or knee joint replacement surgery         [5 points]                                               TRAUMA RELATED RISK FACTORS  [ ] Fracture of the hip, pelvis, or leg                       [5 points]  [ ] Spinal cord injury resulting in paralysis (within the past month)    [5 points]  [ ] Paralysis  (within the past month)                      [5 points]  [ ] Multiple trauma (within the past month)         [5 Points]    Total Score [   9     ]  CAPRINI SCORE Version 2013    Caprini score 7 or higher: High risk, pharmocologic VTE prophylaxis indicated for most patients (in the absence of contraindications)

## 2025-01-22 NOTE — H&P PST ADULT - NSICDXPASTSURGICALHX_GEN_ALL_CORE_FT
PAST SURGICAL HISTORY:  H/O gastric sleeve     H/O umbilical hernia repair     History of shoulder surgery

## 2025-01-22 NOTE — H&P PST ADULT - PROBLEM SELECTOR PLAN 1
Pre op and chlorhexidine instructions given and explained.  Avoid NSAIDs and OTC supplements ( MV)  Patient verbalized understanding  medical optimization requested by surgeon   cbc, bmp, type and screen, ptt/inr, a1c,albumin, carboxyhemoglobin done today in PST

## 2025-01-22 NOTE — H&P PST ADULT - NSICDXPASTMEDICALHX_GEN_ALL_CORE_FT
PAST MEDICAL HISTORY:  Gout     H/O right bundle branch block     Kidney stones     Lumbar herniated disc     Obesity     MYA on CPAP

## 2025-01-23 ENCOUNTER — APPOINTMENT (OUTPATIENT)
Dept: CARDIOLOGY | Facility: CLINIC | Age: 55
End: 2025-01-23
Payer: COMMERCIAL

## 2025-01-23 DIAGNOSIS — Z01.818 ENCOUNTER FOR OTHER PREPROCEDURAL EXAMINATION: ICD-10-CM

## 2025-01-23 PROBLEM — G47.30 SLEEP APNEA, UNSPECIFIED: Chronic | Status: INACTIVE | Noted: 2018-09-28 | Resolved: 2025-01-22

## 2025-01-23 PROCEDURE — 93306 TTE W/DOPPLER COMPLETE: CPT

## 2025-02-04 ENCOUNTER — APPOINTMENT (OUTPATIENT)
Dept: PULMONOLOGY | Facility: CLINIC | Age: 55
End: 2025-02-04

## 2025-02-07 RX ORDER — KETOROLAC TROMETHAMINE 10 MG
30 TABLET ORAL EVERY 6 HOURS
Refills: 0 | Status: DISCONTINUED | OUTPATIENT
Start: 2025-02-10 | End: 2025-02-11

## 2025-02-07 RX ORDER — APREPITANT 40 MG/1
80 CAPSULE ORAL ONCE
Refills: 0 | Status: COMPLETED | OUTPATIENT
Start: 2025-02-10 | End: 2025-02-10

## 2025-02-07 RX ORDER — ACETAMINOPHEN 160 MG/5ML
1000 SUSPENSION ORAL EVERY 6 HOURS
Refills: 0 | Status: DISCONTINUED | OUTPATIENT
Start: 2025-02-10 | End: 2025-02-11

## 2025-02-07 RX ORDER — ONDANSETRON 4 MG/1
4 TABLET, ORALLY DISINTEGRATING ORAL EVERY 6 HOURS
Refills: 0 | Status: DISCONTINUED | OUTPATIENT
Start: 2025-02-10 | End: 2025-02-11

## 2025-02-07 RX ORDER — SODIUM CHLORIDE 9 G/ML
1000 INJECTION, SOLUTION INTRAVENOUS
Refills: 0 | Status: DISCONTINUED | OUTPATIENT
Start: 2025-02-10 | End: 2025-02-11

## 2025-02-07 RX ORDER — PANTOPRAZOLE 20 MG/1
40 TABLET, DELAYED RELEASE ORAL EVERY 24 HOURS
Refills: 0 | Status: DISCONTINUED | OUTPATIENT
Start: 2025-02-10 | End: 2025-02-11

## 2025-02-07 RX ORDER — OXYCODONE HYDROCHLORIDE 30 MG/1
10 TABLET ORAL EVERY 4 HOURS
Refills: 0 | Status: DISCONTINUED | OUTPATIENT
Start: 2025-02-10 | End: 2025-02-11

## 2025-02-07 RX ORDER — ENOXAPARIN SODIUM 100 MG/ML
40 INJECTION SUBCUTANEOUS
Qty: 11.2 | Refills: 0
Start: 2025-02-07 | End: 2025-02-20

## 2025-02-07 RX ORDER — HEPARIN SODIUM,PORCINE 10000/ML
5000 VIAL (ML) INJECTION ONCE
Refills: 0 | Status: COMPLETED | OUTPATIENT
Start: 2025-02-10 | End: 2025-02-10

## 2025-02-07 RX ORDER — ENOXAPARIN SODIUM 100 MG/ML
40 INJECTION SUBCUTANEOUS EVERY 12 HOURS
Refills: 0 | Status: DISCONTINUED | OUTPATIENT
Start: 2025-02-10 | End: 2025-02-11

## 2025-02-07 RX ORDER — OXYCODONE HYDROCHLORIDE 30 MG/1
5 TABLET ORAL EVERY 4 HOURS
Refills: 0 | Status: DISCONTINUED | OUTPATIENT
Start: 2025-02-10 | End: 2025-02-11

## 2025-02-10 ENCOUNTER — INPATIENT (INPATIENT)
Facility: HOSPITAL | Age: 55
LOS: 0 days | Discharge: HOME | DRG: 421 | End: 2025-02-11
Attending: SURGERY | Admitting: SURGERY
Payer: COMMERCIAL

## 2025-02-10 VITALS
WEIGHT: 315 LBS | SYSTOLIC BLOOD PRESSURE: 135 MMHG | OXYGEN SATURATION: 96 % | HEART RATE: 61 BPM | RESPIRATION RATE: 16 BRPM | HEIGHT: 69 IN | TEMPERATURE: 99 F | DIASTOLIC BLOOD PRESSURE: 86 MMHG

## 2025-02-10 DIAGNOSIS — Z98.890 OTHER SPECIFIED POSTPROCEDURAL STATES: Chronic | ICD-10-CM

## 2025-02-10 DIAGNOSIS — E66.01 MORBID (SEVERE) OBESITY DUE TO EXCESS CALORIES: ICD-10-CM

## 2025-02-10 DIAGNOSIS — Z90.3 ACQUIRED ABSENCE OF STOMACH [PART OF]: Chronic | ICD-10-CM

## 2025-02-10 LAB
GLUCOSE BLDC GLUCOMTR-MCNC: 105 MG/DL — HIGH (ref 70–99)
GLUCOSE BLDC GLUCOMTR-MCNC: 116 MG/DL — HIGH (ref 70–99)
GLUCOSE BLDC GLUCOMTR-MCNC: 88 MG/DL — SIGNIFICANT CHANGE UP (ref 70–99)

## 2025-02-10 PROCEDURE — C9399: CPT

## 2025-02-10 PROCEDURE — C1889: CPT

## 2025-02-10 PROCEDURE — 80048 BASIC METABOLIC PNL TOTAL CA: CPT

## 2025-02-10 PROCEDURE — 85025 COMPLETE CBC W/AUTO DIFF WBC: CPT

## 2025-02-10 PROCEDURE — 82962 GLUCOSE BLOOD TEST: CPT

## 2025-02-10 PROCEDURE — 36415 COLL VENOUS BLD VENIPUNCTURE: CPT

## 2025-02-10 PROCEDURE — S2900: CPT

## 2025-02-10 RX ORDER — BUPIVACAINE 13.3 MG/ML
20 INJECTION, SUSPENSION, LIPOSOMAL INFILTRATION ONCE
Refills: 0 | Status: DISCONTINUED | OUTPATIENT
Start: 2025-02-10 | End: 2025-02-11

## 2025-02-10 RX ORDER — TAMSULOSIN HYDROCHLORIDE 0.4 MG/1
1 CAPSULE ORAL
Refills: 0 | DISCHARGE

## 2025-02-10 RX ORDER — ONDANSETRON 4 MG/1
4 TABLET, ORALLY DISINTEGRATING ORAL ONCE
Refills: 0 | Status: DISCONTINUED | OUTPATIENT
Start: 2025-02-10 | End: 2025-02-10

## 2025-02-10 RX ORDER — MECOBAL/LEVOMEFOLAT CA/B6 PHOS 2-3-35 MG
1 TABLET ORAL
Refills: 0 | DISCHARGE

## 2025-02-10 RX ORDER — ACETAMINOPHEN, DEXTROMETHORPHAN HYDROBROMIDE, DOXYLAMINE SUCCINATE, PHENYLEPHRINE HYDROCHLORIDE 325; 10; 6.25; 5 MG/15ML; MG/15ML; MG/15ML; MG/15ML
0 SOLUTION ORAL
Refills: 0 | DISCHARGE

## 2025-02-10 RX ORDER — ACETAMINOPHEN 160 MG/5ML
1000 SUSPENSION ORAL EVERY 6 HOURS
Refills: 0 | Status: COMPLETED | OUTPATIENT
Start: 2025-02-10 | End: 2025-02-10

## 2025-02-10 RX ORDER — SODIUM CHLORIDE 9 G/ML
1000 INJECTION, SOLUTION INTRAVENOUS
Refills: 0 | Status: DISCONTINUED | OUTPATIENT
Start: 2025-02-10 | End: 2025-02-10

## 2025-02-10 RX ORDER — HYDROMORPHONE HYDROCHLORIDE 4 MG/ML
0.5 INJECTION, SOLUTION INTRAMUSCULAR; INTRAVENOUS; SUBCUTANEOUS
Refills: 0 | Status: DISCONTINUED | OUTPATIENT
Start: 2025-02-10 | End: 2025-02-10

## 2025-02-10 RX ADMIN — SODIUM CHLORIDE 200 MILLILITER(S): 9 INJECTION, SOLUTION INTRAVENOUS at 23:36

## 2025-02-10 RX ADMIN — Medication 30 MILLIGRAM(S): at 23:36

## 2025-02-10 RX ADMIN — ENOXAPARIN SODIUM 40 MILLIGRAM(S): 100 INJECTION SUBCUTANEOUS at 23:37

## 2025-02-10 RX ADMIN — PANTOPRAZOLE 40 MILLIGRAM(S): 20 TABLET, DELAYED RELEASE ORAL at 17:01

## 2025-02-10 RX ADMIN — ACETAMINOPHEN 1000 MILLIGRAM(S): 160 SUSPENSION ORAL at 23:37

## 2025-02-10 RX ADMIN — APREPITANT 80 MILLIGRAM(S): 40 CAPSULE ORAL at 11:27

## 2025-02-10 RX ADMIN — ACETAMINOPHEN 400 MILLIGRAM(S): 160 SUSPENSION ORAL at 23:37

## 2025-02-10 RX ADMIN — Medication 5000 UNIT(S): at 11:10

## 2025-02-10 NOTE — PATIENT PROFILE ADULT - FALL HARM RISK - UNIVERSAL INTERVENTIONS
Bed in lowest position, wheels locked, appropriate side rails in place/Call bell, personal items and telephone in reach/Instruct patient to call for assistance before getting out of bed or chair/Non-slip footwear when patient is out of bed/[place] to call system/Physically safe environment - no spills, clutter or unnecessary equipment/Purposeful Proactive Rounding/Room/bathroom lighting operational, light cord in reach

## 2025-02-10 NOTE — ASU PREOP CHECKLIST - SELECT TESTS ORDERED
Patient ID: Dorinda Roman  YOB: 1979  MRN: [id number]    Chief Complaint: Pain and Injury of the Left Elbow and Pain and Injury of the Right Knee    Referred By: AT    Occupation: [employer] teacher      History of Present Illness: Dorinda Roman is a right-hand dominant 44 y.o. female who presents today with Pain and Injury of the Left Elbow and Pain and Injury of the Right Knee    Dorinda Roman states it is Acute in nature and there was a specific mechanism. Fell onto her left elbow and had valgus moment and direct hit to right knee one week prior. Went and saw AT at school who referred her here for consult.  Dorinda Roman describes burning pain along ulnar left arm into hand and continuous pain at medial knee. Associated symptoms include: Swelling No, Instability No, Pain that affects your sleep No, Mechanical No, locking/catching No, Neurological Yes, limited range of motion No. Aggravating activities include direct touch, elbow extension, knee flexion. Symptoms worsen throughout the day.     Past Medical History:   History reviewed. No pertinent past medical history.  History reviewed. No pertinent surgical history.  No family history on file.  Social History     Socioeconomic History    Marital status:      Medication List with Changes/Refills   Current Medications    LEVOTHYROXINE (SYNTHROID) 100 MCG TABLET    Take 100 mcg by mouth before breakfast.    TRAZODONE (DESYREL) 50 MG TABLET    Take 50 mg by mouth every evening.     Review of patient's allergies indicates:  No Known Allergies    Physical Exam:   There is no height or weight on file to calculate BMI.    Physical Exam  Musculoskeletal:      Right elbow: No deformity or effusion.      Left elbow: No deformity or effusion.      Right knee: Swelling present.      Instability Tests: Medial Cristine test negative and lateral Cristine test negative.     Detailed MSK exam:             Right Knee Exam     Inspection    Swelling: present  Bruising: present (medial tibial plateua)    Tenderness   The patient is tender to palpation of the pes anserinus and tibial tubercle.    Range of Motion   Extension:  normal   Flexion:  normal     Tests   Meniscus   Cristine:  Medial - negative Lateral - negative  Ligament Examination   Lachman: normal (-1 to 2mm)   PCL-Posterior Drawer: normal (0 to 2mm)     MCL - Valgus: normal (0 to 2mm)  LCL - Varus: normal  Patella   Patellar apprehension: negative  Patellar Grind: negative  J-Sign: noneLeft Hand/Wrist Exam     Range of Motion     Wrist   Extension:  normal   Flexion:  normal   Abduction: normal  Adduction: normal      Right Elbow Exam     Inspection   Effusion: absent  Bruising: absent  Deformity: absent  Atrophy: absent      Left Elbow Exam     Inspection   Effusion: absent  Bruising: absent  Deformity: absent  Atrophy: absent    Pain   The patient exhibits pain of the medial epicondyle and olecranon    Tenderness   The patient is tender to palpation of the medial epicondyle.     Range of Motion   Extension:  normal   Flexion:  normal   Pronation:  normal   Supination:  normal     Tests   Varus: negative  Valgus: negative  Tinel's sign (cubital tunnel): positive  Tennis Elbow: negative  Golfer's Elbow: negative  Radial Capitellar Grind: negative           Imaging:  X-Ray Elbow Complete Left  Narrative: EXAMINATION:  XR ELBOW COMPLETE 3 VIEW LEFT    CLINICAL HISTORY:  Pain in left elbow    TECHNIQUE:  AP, lateral, and oblique views of the left elbow were performed.    COMPARISON:  None    FINDINGS:  No acute abnormality or significant arthritic change.  Impression: As above    Electronically signed by: Ryan Trimble MD  Date:    09/19/2024  Time:    15:51      Relevant imaging results were reviewed and interpreted by me and per my read:  Normal-appearing radiographs of the left elbow.  Normal alignment.  No erosive or degenerative changes.  No fractures or other acute  abnormalities.    This was discussed with the patient and / or family today.     Patient Instructions   Assessment:  Dorinda Roman is a 44 y.o. female with a chief complaint of Pain and Injury of the Left Elbow and Pain and Injury of the Right Knee    Encounter Diagnoses   Name Primary?    Neuropraxia of left ulnar nerve, initial encounter Yes    Contusion of left elbow, initial encounter     Contusion of right knee, initial encounter       Plan:  XR reviewed - normal findings of the left elbow.    History and clinical exam is consistent with acute left elbow pain due to ulnar neurapraxia following contusion left elbow.  Right knee pain is consistent with contusion.  Diagnosis, treatment options, prognosis discussed today.    Recommend conservative management, watchful waiting.  Would expect the neuropathic pain from the elbow to gradually improve over the next week or so, as the nerve heals in the swelling subsides.  If pain and discomfort persists after another 3-4 weeks, may consider an EMG/NCS vs a cubital tunnel injection.  Right knee pain should gradually improve with time.    Use Tylenol and/or ibuprofen, as needed for pain and discomfort.    Can ice the affected elbow and/or knee, as needed, for 10-15 minutes of time, up to 2-3 times per day, as needed.    Follow-up:  As needed or sooner if there are any problems between now and then.    Thank you for choosing [facility] and Dr. Jorge Melo for your orthopedic & sports medicine care. It is our goal to provide you with exceptional care that will help keep you healthy, active, and get you back in the game.    Please do not hesitate to reach out to us via email, phone, or MyChart with any questions, concerns, or feedback.    If you are experiencing pain/discomfort ,or have questions after 5pm and would like to be connected to the [facility] on-call team, please call this number and specify which  Sports Medicine provider is treating you: [phone]       A copy of today's visit note has been sent to the referring provider.           Jorge Melo MD  Primary Care Sports Medicine    Disclaimer: This note was prepared using a voice recognition system and is likely to have sound alike errors within the text.    a1c   carboxy/BMP/CBC/PT/PTT/INR/Type and Screen/EKG/POCT Blood Glucose

## 2025-02-10 NOTE — BRIEF OPERATIVE NOTE - NSICDXBRIEFPROCEDURE_GEN_ALL_CORE_FT
PROCEDURES:  Robot-assisted duodenal switch 10-Feb-2025 15:12:44  Brianne Chapman  Upper endoscopy 10-Feb-2025 15:12:58  Brianne Chapman

## 2025-02-11 ENCOUNTER — TRANSCRIPTION ENCOUNTER (OUTPATIENT)
Age: 55
End: 2025-02-11

## 2025-02-11 VITALS
TEMPERATURE: 98 F | DIASTOLIC BLOOD PRESSURE: 56 MMHG | SYSTOLIC BLOOD PRESSURE: 111 MMHG | OXYGEN SATURATION: 99 % | RESPIRATION RATE: 18 BRPM | HEART RATE: 68 BPM

## 2025-02-11 DIAGNOSIS — E66.01 MORBID (SEVERE) OBESITY DUE TO EXCESS CALORIES: ICD-10-CM

## 2025-02-11 LAB
ANION GAP SERPL CALC-SCNC: 3 MMOL/L — LOW (ref 5–17)
BASOPHILS # BLD AUTO: 0.02 K/UL — SIGNIFICANT CHANGE UP (ref 0–0.2)
BASOPHILS NFR BLD AUTO: 0.2 % — SIGNIFICANT CHANGE UP (ref 0–2)
BUN SERPL-MCNC: 13 MG/DL — SIGNIFICANT CHANGE UP (ref 7–23)
CALCIUM SERPL-MCNC: 8.2 MG/DL — LOW (ref 8.5–10.1)
CHLORIDE SERPL-SCNC: 109 MMOL/L — HIGH (ref 96–108)
CO2 SERPL-SCNC: 25 MMOL/L — SIGNIFICANT CHANGE UP (ref 22–31)
CREAT SERPL-MCNC: 0.91 MG/DL — SIGNIFICANT CHANGE UP (ref 0.5–1.3)
EGFR: 101 ML/MIN/1.73M2 — SIGNIFICANT CHANGE UP
EOSINOPHIL # BLD AUTO: 0.11 K/UL — SIGNIFICANT CHANGE UP (ref 0–0.5)
EOSINOPHIL NFR BLD AUTO: 1 % — SIGNIFICANT CHANGE UP (ref 0–6)
GLUCOSE BLDC GLUCOMTR-MCNC: 100 MG/DL — HIGH (ref 70–99)
GLUCOSE SERPL-MCNC: 95 MG/DL — SIGNIFICANT CHANGE UP (ref 70–99)
HCT VFR BLD CALC: 43 % — SIGNIFICANT CHANGE UP (ref 39–50)
HGB BLD-MCNC: 14.3 G/DL — SIGNIFICANT CHANGE UP (ref 13–17)
IMM GRANULOCYTES NFR BLD AUTO: 0.4 % — SIGNIFICANT CHANGE UP (ref 0–0.9)
LYMPHOCYTES # BLD AUTO: 1.1 K/UL — SIGNIFICANT CHANGE UP (ref 1–3.3)
LYMPHOCYTES # BLD AUTO: 10.4 % — LOW (ref 13–44)
MCHC RBC-ENTMCNC: 29.3 PG — SIGNIFICANT CHANGE UP (ref 27–34)
MCHC RBC-ENTMCNC: 33.3 G/DL — SIGNIFICANT CHANGE UP (ref 32–36)
MCV RBC AUTO: 88.1 FL — SIGNIFICANT CHANGE UP (ref 80–100)
MONOCYTES # BLD AUTO: 0.72 K/UL — SIGNIFICANT CHANGE UP (ref 0–0.9)
MONOCYTES NFR BLD AUTO: 6.8 % — SIGNIFICANT CHANGE UP (ref 2–14)
NEUTROPHILS # BLD AUTO: 8.58 K/UL — HIGH (ref 1.8–7.4)
NEUTROPHILS NFR BLD AUTO: 81.2 % — HIGH (ref 43–77)
PLATELET # BLD AUTO: 400 K/UL — SIGNIFICANT CHANGE UP (ref 150–400)
POTASSIUM SERPL-MCNC: 4.3 MMOL/L — SIGNIFICANT CHANGE UP (ref 3.5–5.3)
POTASSIUM SERPL-SCNC: 4.3 MMOL/L — SIGNIFICANT CHANGE UP (ref 3.5–5.3)
RBC # BLD: 4.88 M/UL — SIGNIFICANT CHANGE UP (ref 4.2–5.8)
RBC # FLD: 13.2 % — SIGNIFICANT CHANGE UP (ref 10.3–14.5)
SODIUM SERPL-SCNC: 137 MMOL/L — SIGNIFICANT CHANGE UP (ref 135–145)
WBC # BLD: 10.57 K/UL — HIGH (ref 3.8–10.5)
WBC # FLD AUTO: 10.57 K/UL — HIGH (ref 3.8–10.5)

## 2025-02-11 RX ORDER — ACETAMINOPHEN 160 MG/5ML
1000 SUSPENSION ORAL EVERY 6 HOURS
Refills: 0 | Status: COMPLETED | OUTPATIENT
Start: 2025-02-11 | End: 2025-02-11

## 2025-02-11 RX ORDER — DOCUSATE SODIUM 100 MG
1 CAPSULE ORAL
Refills: 0 | DISCHARGE

## 2025-02-11 RX ADMIN — Medication 30 MILLIGRAM(S): at 11:43

## 2025-02-11 RX ADMIN — ACETAMINOPHEN 400 MILLIGRAM(S): 160 SUSPENSION ORAL at 11:27

## 2025-02-11 RX ADMIN — ACETAMINOPHEN 1000 MILLIGRAM(S): 160 SUSPENSION ORAL at 05:01

## 2025-02-11 RX ADMIN — Medication 30 MILLIGRAM(S): at 05:01

## 2025-02-11 RX ADMIN — Medication 30 MILLIGRAM(S): at 11:26

## 2025-02-11 RX ADMIN — SODIUM CHLORIDE 200 MILLILITER(S): 9 INJECTION, SOLUTION INTRAVENOUS at 00:17

## 2025-02-11 RX ADMIN — ENOXAPARIN SODIUM 40 MILLIGRAM(S): 100 INJECTION SUBCUTANEOUS at 11:27

## 2025-02-11 RX ADMIN — ACETAMINOPHEN 1000 MILLIGRAM(S): 160 SUSPENSION ORAL at 11:42

## 2025-02-11 RX ADMIN — ACETAMINOPHEN 400 MILLIGRAM(S): 160 SUSPENSION ORAL at 05:01

## 2025-02-11 RX ADMIN — SODIUM CHLORIDE 200 MILLILITER(S): 9 INJECTION, SOLUTION INTRAVENOUS at 05:00

## 2025-02-11 NOTE — DISCHARGE NOTE NURSING/CASE MANAGEMENT/SOCIAL WORK - PATIENT PORTAL LINK FT
You can access the FollowMyHealth Patient Portal offered by [facility] by registering at the following website: [email or web address]. By joining [facility]’s FollowMyHealth portal, you will also be able to view your health information using other applications (apps) compatible with our system.

## 2025-02-11 NOTE — PROGRESS NOTE ADULT - SUBJECTIVE AND OBJECTIVE BOX
Post Op Day#: 1  Procedure:  Laparoscopic robotic assisted conversion of Sleeve Gastrectomy to duodenal switch    The patient is doing well without complaints.    Vital Signs Last 24 Hrs  T(C): 36.2 (11 Feb 2025 08:00), Max: 37.1 (10 Feb 2025 10:59)  T(F): 97.2 (11 Feb 2025 08:00), Max: 98.8 (10 Feb 2025 10:59)  HR: 58 (11 Feb 2025 08:00) (45 - 63)  BP: 105/55 (11 Feb 2025 08:00) (93/55 - 135/86)  BP(mean): --  RR: 18 (11 Feb 2025 08:00) (14 - 18)  SpO2: 99% (11 Feb 2025 08:00) (96% - 100%)    Parameters below as of 11 Feb 2025 08:00  Patient On (Oxygen Delivery Method): room air        PHYSICAL EXAM:  General: NAD.  HEENT: no JVD, no jaundice.  LUNGS: CTAB.  Heart: S1 S2 RRR  Abd: soft nt/nd   Wounds: clean dry and intact                          14.3   10.57 )-----------( 400      ( 11 Feb 2025 07:29 )             43.0       02-11    137  |  109[H]  |  13  ----------------------------<  95  4.3   |  25  |  0.91    Ca    8.2[L]      11 Feb 2025 07:29

## 2025-02-11 NOTE — PROGRESS NOTE ADULT - PROBLEM SELECTOR PLAN 1
The patient is s/p Laparoscopic robotic assisted conversion of sleeve gastrectomy to duodenal switch and doing very well.  All discharge instructions were given to the patient, as well as potential signs of complications.  The patient will follow up in 2 weeks.  [facility]

## 2025-02-11 NOTE — DISCHARGE NOTE NURSING/CASE MANAGEMENT/SOCIAL WORK - FINANCIAL ASSISTANCE
[facility] provides services at a reduced cost to those who are determined to be eligible through [facility]’s financial assistance program. Information regarding [facility]’s financial assistance program can be found by going to [email or web address] or by calling [phone].

## 2025-02-11 NOTE — DISCHARGE NOTE PROVIDER - NSDCMRMEDTOKEN_GEN_ALL_CORE_FT
acetaminoph/dextromethorph/doxylamin/PE: once a day  Lovenox 40 mg/0.4 mL injectable solution: 40 milligram(s) subcutaneously 2 times a day MDD: 2  Multiple Vitamins oral tablet: 1 tab(s) orally once a day  tamsulosin 0.4 mg oral capsule: 1 cap(s) orally once a day  Vitamin D3 125 mcg (5000 intl units) oral capsule: 1 cap(s) orally once a day

## 2025-02-11 NOTE — PROGRESS NOTE ADULT - ASSESSMENT
Patient is a 55 yo M that is s/p laparoscopic robotic assisted conversion of sleeve gastrectomy to duodenal switch

## 2025-02-11 NOTE — DISCHARGE NOTE PROVIDER - HOSPITAL COURSE
Patient is a 53 yo M that underwent laparoscopic robotic assisted conversion of sleeve gastrectomy to Duodenal switch without any complications. Patient is currently doing very well, pain controlled, and is tolerating phase I bariatric diet. Patient has had uncomplicated hospital course and is stable for discharge home with follow-up in the office in 2 weeks.

## 2025-02-11 NOTE — DISCHARGE NOTE PROVIDER - CARE PROVIDER_API CALL
David Langford  Surgery  [address] [place] [address], [address]  [place] [address]  Phone: [phone]  Fax: [phone]  Follow Up Time:

## 2025-02-11 NOTE — DISCHARGE NOTE PROVIDER - NSDCCPTREATMENT_GEN_ALL_CORE_FT
PRINCIPAL PROCEDURE  Procedure: Robot-assisted duodenal switch  Findings and Treatment:       SECONDARY PROCEDURE  Procedure: Upper endoscopy  Findings and Treatment:

## 2025-02-17 DIAGNOSIS — E66.01 MORBID (SEVERE) OBESITY DUE TO EXCESS CALORIES: ICD-10-CM

## 2025-02-17 DIAGNOSIS — Z87.442 PERSONAL HISTORY OF URINARY CALCULI: ICD-10-CM

## 2025-02-17 DIAGNOSIS — G47.33 OBSTRUCTIVE SLEEP APNEA (ADULT) (PEDIATRIC): ICD-10-CM

## 2025-02-17 DIAGNOSIS — Z98.84 BARIATRIC SURGERY STATUS: ICD-10-CM

## 2025-02-17 DIAGNOSIS — I45.10 UNSPECIFIED RIGHT BUNDLE-BRANCH BLOCK: ICD-10-CM

## 2025-02-17 DIAGNOSIS — Z79.899 OTHER LONG TERM (CURRENT) DRUG THERAPY: ICD-10-CM

## 2025-02-17 NOTE — CHART NOTE - NSCHARTNOTEFT_GEN_A_CORE
Operative Note    Patient: Madi Boudreaux  : Aug 5, 1970  Surgery date: February 10, 2025  Pre-Operative Diagnosis: Refractory morbid obesity with multiple comorbid conditions.    Post-operative Diagnosis: Same    Primary Procedure  [74558] Robot Assisted Modified Duodenal Switch     Secondary Procedure(s)  [13833] Uppr Gi Endoscopy, Diagnosis   [27514] Transversus Abdominis Plan (TAP) Block Bilateral     Surgeon: David Langford M.D., FACS   Assistant Surgeon: Brianne Chapman RPA    Anesthesia type: General Anesthesia     Specimens: partial gastrectomy sent to pathology    Estimated blood loss: 30 ml     DRAINS: NONE    COMPLICATIONS: NONE     INDICATIONS OF THE PROCEDURE: The patient is a 54-year-old male who is morbidly obese with a body mass index of 50. The patient has multiple comorbidities due to his morbid obesity including obstructive sleep apnea. The patient had a sleeve gastrectomy in  by Dr. Johnson. The patient has failed multiple nonoperative attempts at weight loss. The patient meets NIH criteria fro bariatric surgery and underwent appropriate preoperative workup, education, and signed the consent form freely. The patient had risks and benefits explained including, but not limited to, bleeding, leak, infection, disability, injury to transient structures, aspiration, DVT, pulmonary embolism, and death. The patient understood and agreed to proceed with surgery.     DESCRIPTION OF PROCEDURE: The patient was identified properly via a time-out. The patient was brought into the operating room and was placed on the operating room table in supine position. The patient was then given general endotracheal anesthesia and was given preoperative antibiotics. Preoperative heparin was given in the ambulatory surgery unit. The patient was then prepped and draped in the usual sterile fashion. An Exparel mixture was mixed at the back table with 20 mL of Exparel, 30 mL of 0.25% Marcaine and 150 mL of normal saline. A Veress needle was placed in the left upper quadrant. The abdomen was insufflated to 15 millimeters of mercury. Once the abdomen was insufflated, the Exparel mixture was given subcutaneously at the sites of incision. An incision was made within the umbilicus and a 8-millimeter robotic trocar was placed in the abdomen. The laparoscope was inserted and there was no injury on initial trocar placement or initial Veress needle placement. A Transversus Abdominus Plane Block was then conducted by placing 20 mL of the Exparel mixture preperitoneal at the distribution of the spinal nerves on the lateral abdominal wall. This was started at the costal margin at the mid axillary line. 20cc of the Exparel mixture was placed in this area. Another 20 mL was placed four centimeters caudal to this area. Another 20 mL was placed four centimeters caudal to this area and another 15 mL was placed four centimeters caudal to this area. This was repeated on the opposite side of the body in a similar fashion. The rest of the Exparel was placed into the subcutaneous tissues and preperitoneal at every trocar site. A 12-millimeter robotic trocar was placed in the right upper quadrant. Two 8 millimeter robotic trochars were placed in the left upper quadrant. All of the trochars were placed at the same level on the abdomen. An incision was made in subxiphoid area and a liver retractor was placed to hold the liver anteriorly and superiorly and was held in place using Mediflex device. The bowel was then run back 300 cm from the ileocecal valve. This was then sutured to the omentum just under the gallbladder. The patient was then placed into steep reverse Trendelenburg position. At this point to the robot was docked to the robotic trochars. 2 graspers were used to retract the stomach. The sleev was observed and it was a decent size. There was no hiatal hernia that was observed. We turned our attention to the duodenum. We dissected the duodenum posteriorly approximately 2 cm distal to the pylorus. At this point, grasper was brought behind the duodenum to completely mobilized it and then a 60 mm white load was used to transect the duodenum at this point. We then brought our small bowel off of the omentum up to the duodenum for the SELENA anastomosis. This anastomosis was done in 2 layers. There was a running 3-0 absorbable vlock suture placed from the small bowel to the duodenum which approximated the 2 loops of bowel, then an enterotomy was made in both loops of bowel into the small intestine and into the duodenum. A posteriorly layer using 3-0 absorbable vlock suture was then used in a running fashion. Then the anterior layer of the anastomosis was then run with the 3-0 absorbable vlock suture and then the anastomosis was then Lemberted with a 3- 0 absorbable vlock suture in a Lembert fashion. The anastomosis was patent. The afferent limb of the small intestine was then sutured to the proximal sleeve so that the food would flow from the sleeve into the efferent limb of the small intestine using 3-0 Ethibond. The robot was then undocked and the bowel was then clamped and then an endoscopy was then performed in the endoscopy showed that the anastomosis was patent and the anastomosis was approximately 2 cm in length. At this time the anastomosis was completed. Hemostasis was achieved. The remaining stomach was placed under saline solution and an endoscopy was then conducted. There was no leak from the staple line. There was no bleeding or stricture at the staple line. Once the endoscopy was completed, complete hemostasis was assured. The stomach specimen was placed into a 15mm Endo catch bag and removed from the abdomen. The 12mm trocar defect was closed with a 0 Vicryl suture using the suture passer. Upon completion of the procedure, the abdomen was desufflated and all trocars were removed. The skin was closed with 4-0 Monocryl running subcuticular sutures. The patient tolerated the procedure well.    Disposition  To recovery room, VS stable.

## 2025-02-17 NOTE — ED ADULT NURSE NOTE - IS THE PATIENT ABLE TO BE SCREENED?
Dr. Titus patient  Refill for: gabapentin (Neurontin) 600 mg tablet  [facility] - [place], [place] - [address]  
Recent Visits  Date Type Provider Dept   06/18/24 Office Visit Darien Titus MD [facility]   02/27/24 [facility]   Showing recent visits within past 365 days and meeting all other requirements  Future Appointments  No visits were found meeting these conditions.  Showing future appointments within next 90 days and meeting all other requirements        
Yes

## 2025-02-21 PROBLEM — M51.26 OTHER INTERVERTEBRAL DISC DISPLACEMENT, LUMBAR REGION: Chronic | Status: ACTIVE | Noted: 2025-01-22

## 2025-02-21 PROBLEM — G47.33 OBSTRUCTIVE SLEEP APNEA (ADULT) (PEDIATRIC): Chronic | Status: ACTIVE | Noted: 2025-01-22

## 2025-02-21 PROBLEM — Z86.79 PERSONAL HISTORY OF OTHER DISEASES OF THE CIRCULATORY SYSTEM: Chronic | Status: ACTIVE | Noted: 2025-01-22

## 2025-02-21 PROBLEM — M10.9 GOUT, UNSPECIFIED: Chronic | Status: ACTIVE | Noted: 2025-01-22

## 2025-02-21 PROBLEM — N20.0 CALCULUS OF KIDNEY: Chronic | Status: ACTIVE | Noted: 2025-01-22

## 2025-02-27 ENCOUNTER — TRANSCRIPTION ENCOUNTER (OUTPATIENT)
Age: 55
End: 2025-02-27

## 2025-04-07 ENCOUNTER — APPOINTMENT (OUTPATIENT)
Dept: CARDIOLOGY | Facility: CLINIC | Age: 55
End: 2025-04-07

## 2025-04-09 NOTE — ED ADULT NURSE NOTE - CAS EDN DISCHARGE INTERVENTIONS
Problem: At Risk for Falls  Goal: Patient does not fall  Outcome: Monitoring/Evaluating progress  Goal: Patient takes action to control fall-related risks  Outcome: Monitoring/Evaluating progress     Problem: At Risk for Injury Due to Fall  Goal: Patient does not fall  Outcome: Monitoring/Evaluating progress  Goal: Takes action to control condition specific risks  Outcome: Monitoring/Evaluating progress  Goal: Verbalizes understanding of fall-related injury personal risks  Description: Document education using the patient education activity  Outcome: Monitoring/Evaluating progress     Problem: Pain  Goal: Acceptable pain level achieved/maintained at rest using appropriate pain scale for the patient  Outcome: Monitoring/Evaluating progress  Goal: Acceptable pain level achieved/maintained with activity using appropriate pain scale for the patient  Outcome: Monitoring/Evaluating progress  Goal: Acceptable pain level achieved/maintained without oversedation  Outcome: Monitoring/Evaluating progress      IV discontinued, cath removed intact

## 2025-05-29 NOTE — DATA REVIEWED
DERMATOLOGY PROGRESS NOTE  HISTORY OF PRESENT ILLNESS:  History of Present Illness      Visit for FBSE with the following concerns:     #1 Patient reports ***. Denies any symptoms of itching, bleeding or irritation. Denies any change in size or color.   Duration: ***  Past treatment: ***    #2 Patient reports ***. Denies any symptoms of itching, bleeding or irritation. Denies any change in size or color.   Duration: ***  Past treatment: ***    #3 Patient reports ***. Denies any symptoms of itching, bleeding or irritation. Denies any change in size or color.   Duration: ***  Past treatment: ***     Patient is not on immunosuppressive therapy and patient does not have history of radiation        PAST DERMATOLOGIC HISTORY:   BCC; posterior vertex scalp; s/p Mohs 10/27/2020    No intolerance to Lidocaine or Epinephrine    FAMILY HISTORY:   History of skin cancer--Type unknown-maternal uncle    PHYSICAL EXAM:  Examination of the scalp/body hair, face, neck, ears, eyelids/conjunctiva, lips/oral mucosa, chest, back, abdomen, right upper extremity, left upper extremity, right lower extremity, left lower extremity, digits/nails and genitals/buttocks was performed   Unable to assess {[facility]:565139::\"Not applicable\"}  (Physical exam findings noted in A/P)      LABS REVIEWED:  {[facility] GENERAL LABS:650825}       ASSESSMENT PLAN:    BENIGN NEVI/BENIGN NEVUS (Brown macules/papules with regular pigment network noted throughout the body)  -clinically benign today on exam, reassurance provided  -continue to monitor for any changes call if any changes occur  - Recommend daily physical sunblock SPF30+ with reapplication     SUN DISTRIBUTED LENTIGINES  (Tan to brown macules and patches throughout the body)  -clinically benign today on exam, reassurance provided  -continue to monitor for any changes and call if any changes occur and recommend daily physical sunblock SPF30+ with reapplication per 's instructions and photoprotection  [FreeTextEntry1] : 03/31/23\par OC X Ray Left shoulder:4 views:\par Unremarkable\par \par 11/29/22\par OC X Ray Right Shoulder: 4 views:\par Unremarkable w/UV-protective clothing    CHERRY ANGIOMATA (Multiple red 2-4mm papules scattered throughout the body)  -clinically benign today on exam, reassurance was given  -continue to monitor for any changes  -counseled to return to clinic if any papules or ulcerations/erosions develop  -call if any changes occur    Seborrheic Keratoses (brown-grey, waxy, hyperkeratotic papules throughout the body)  -nature of the disorder discussed in detail  -clinically benign today on exam, reassurance was given  -continue to monitor for any changes  -call if any changes occur    Personal History of Melanoma  - Patient with a history of melanoma, located on the vertex of scalp awaiting excision with Dr Hernandez  - no signs of recurrence on exam today. No lymphadenopathy  - FBSE (full body skin examination) done today   -  Patient counseled on strict sun precautions including photoprotection, sunscreen use, and avoidance of peak sunlight hours  -  Patient counseled on monthly self skin exams (including lymph nodes) and if they notice any of the changes as outlined, they should return for re-evaluation  -  The patient was counseled on the A, B, C, D, E's of melanoma detection  - continue regular skin checks with dermatology  - first degree relatives should have exam with dermatology d/t higher risk of MM  ***-referral to ophthalmology for routine ocular evaluation  ***-referral to gynecology for routine evaluation    ***    Sun Damaged Skin  - discussed increased risk of skin cancer  - Sun-protective behavior discussed including the use of sunscreen SPF 30+ daily and reapplying at least every 2 hours.    - Avoid peak sun hours and seek shade from 10 a.m. to 4 p.m. when possible  - Sun precautions handout provided    Skin cancer screening   Suggest sunscreens, monthly self-examinations. Patient to watch for the ABCDE's of pigmented lesions or persistent crusts, erosions, irritated areas.  Observe closely for skin damage/changes, and call if  such occurs.  Recommend *** skin exams.    Call sooner if any problems or concerns.      PROCEDURES PERFORMED:  No orders of the defined types were placed in this encounter.      No follow-ups on file.  11/25/2025      Call sooner if any problems or concerns.       I, ***, MA scribed the services personally performed by Ambreen Mills PA-C  {Scribe for provider:[id number]}